# Patient Record
Sex: MALE | Race: BLACK OR AFRICAN AMERICAN | Employment: UNEMPLOYED
[De-identification: names, ages, dates, MRNs, and addresses within clinical notes are randomized per-mention and may not be internally consistent; named-entity substitution may affect disease eponyms.]

---

## 2017-12-03 ENCOUNTER — HEALTH MAINTENANCE LETTER (OUTPATIENT)
Age: 22
End: 2017-12-03

## 2025-04-10 ENCOUNTER — TELEPHONE (OUTPATIENT)
Dept: CARDIOLOGY | Facility: CLINIC | Age: 30
End: 2025-04-10

## 2025-04-10 NOTE — TELEPHONE ENCOUNTER
Attempted to reach pt via [facility] . DARON asking pt to call and schedule with Dr Malave

## 2025-04-14 ENCOUNTER — OFFICE VISIT (OUTPATIENT)
Dept: CARDIOLOGY | Facility: CLINIC | Age: 30
End: 2025-04-14
Attending: SURGERY
Payer: COMMERCIAL

## 2025-04-14 ENCOUNTER — VIRTUAL VISIT (OUTPATIENT)
Dept: INTERPRETER SERVICES | Facility: CLINIC | Age: 30
End: 2025-04-14
Payer: COMMERCIAL

## 2025-04-14 VITALS
DIASTOLIC BLOOD PRESSURE: 84 MMHG | SYSTOLIC BLOOD PRESSURE: 119 MMHG | OXYGEN SATURATION: 99 % | HEART RATE: 53 BPM | WEIGHT: 149.2 LBS

## 2025-04-14 DIAGNOSIS — I35.0 AORTIC VALVE STENOSIS, ETIOLOGY OF CARDIAC VALVE DISEASE UNSPECIFIED: Primary | ICD-10-CM

## 2025-04-14 DIAGNOSIS — Z98.890 HX OF HEART SURGERY: ICD-10-CM

## 2025-04-14 PROCEDURE — G0463 HOSPITAL OUTPT CLINIC VISIT: HCPCS | Performed by: SURGERY

## 2025-04-14 PROCEDURE — 99204 OFFICE O/P NEW MOD 45 MIN: CPT | Performed by: SURGERY

## 2025-04-14 PROCEDURE — T1013 SIGN LANG/ORAL INTERPRETER: HCPCS | Mod: U4,TEL,95

## 2025-04-14 ASSESSMENT — PAIN SCALES - GENERAL: PAINLEVEL_OUTOF10: NO PAIN (0)

## 2025-04-14 NOTE — Clinical Note
4/14/2025      RE: Leonel Mack  [address]  [address]  [facility] [address]       Dear Colleague,    Thank you for the opportunity to participate in the care of your patient, Leonel Mack, at the [facility]. Please see a copy of my visit note below.    No notes on file    Please do not hesitate to contact me if you have any questions/concerns.     Sincerely,     Francisco Javier Malave MD

## 2025-04-14 NOTE — CONFIDENTIAL NOTE
[facility]  [address] [place] [id number]  Telephone: [phone]  Fax: [phone]     Dental clearance sent via fax

## 2025-04-14 NOTE — NURSING NOTE
Chief Complaint   Patient presents with    New Patient     NEW CV SURGERY     Vitals were taken and medications reconciled.    MARC Stinson  3:06 PM

## 2025-04-14 NOTE — PATIENT INSTRUCTIONS
You were seen today in the [facility]                     Cardiothoracic Surgery Clinic    Your surgeon was Dr Francisco Javier Malave recommends:    Aortic valve repair   Pre operative testing including; history and physical, labs, EKG, CT chest, carotid ultrasound, v - we will get these scheduled for you!  Dental clearance (please reach out to your dentist to get cleared for surgery).      *Pre operative testing needed within 30 days of surgery. History and physical with the anesthesia team (PAC), labs, EKG, and chest xray.     *You will be contacted by our surgery scheduler Uma to set up your surgery date. Once we have your surgery date you will hear from Marie, our clinic scheduler, to set up your pre op appointments. Once these are both scheduled you will get a letter with pre operative instructions including possible medication holds, where and when to be at the hospital, and what to bring with you.    Please feel free to call me with any questions or concerns.    Guera Nava RN   Cardiothoracic Surgery  [phone]

## 2025-04-14 NOTE — LETTER
"clubbing or cyanosis.  There is no lower extremity edema.  SKIN: Warm, dry, without jaundice. No rashes or any obvious lesions.  NEUROLOGIC: Grossly nonfocal, CN II-XII intact bilaterally, moving all 4 extremities.      PERTINENT LAB AND TEST RESULTS            Aspartate Aminotransferase (AST)   Date Value Ref Range Status   03/31/2022 17 0 - 40 U/L U/L Final               Glucose   Date Value Ref Range Status   03/31/2022 97 70 - 100 mg/dL Final            Blood Urea Nitrogen   Date Value Ref Range Status   03/31/2022 12.2 6.0 - 20.0 mg/dL Final            Creatinine   Date Value Ref Range Status   03/31/2022 1.01 0.67 - 1.17 mg/dL Final            Chloride   Date Value Ref Range Status   03/31/2022 104 98 - 107 mmol/L Final            Sodium   Date Value Ref Range Status   03/31/2022 137 136 - 145 mmol/L Final            Potassium   Date Value Ref Range Status   03/31/2022 4.2 3.5 - 5.1 mmol/L Final            Calcium, Total   Date Value Ref Range Status   03/31/2022 9.5 8.6 - 10.5 mg/dL Final            CO2   Date Value Ref Range Status   03/31/2022 25 22 - 29 mmol/L Final               No results found for: \"MAG\"           No results found for: \"BNP\"     No results found for: \"CHOL\", \"TRIG\", \"HDL\", \"VLDL\", \"LDL\"               Echocardiogram done today demonstrates normal LV function with a mean gradient of 50 mmHg across his aortic valve      ASSESSMENT/PLAN      Failing bioprosthetic aortic valve replacement: Prior 19 mm epic valve with gradients in the high 20s low 30s chronically though now significantly elevated with a mean gradient today of 50.  Some shortness of breath on exertion.  Given failing bioprosthetic aortic valve in a young patient I think the best option would be redo surgical aortic valve replacement with a mechanical valve.    I discussed the risks and benefits of surgical aortic valve replacement with a mechanical valve. I discussed the risks and benefits of surgery including the risks of " death, bleeding, stroke, infection, renal failure and arrhythmias. He understands and is willing to proceed. He and his family understands the need for lifelong anticoagulation.. He will need a CT chest as part of his evaluation.       Please do not hesitate to contact me if you have any questions/concerns.     Sincerely,     Francisco Javier Malave MD

## 2025-04-16 ENCOUNTER — TELEPHONE (OUTPATIENT)
Dept: CARDIOLOGY | Facility: CLINIC | Age: 30
End: 2025-04-16
Payer: COMMERCIAL

## 2025-04-16 NOTE — TELEPHONE ENCOUNTER
Per task, pt needs to schedule surgery with . tried calling pt with  services. No answer. Will try again later

## 2025-04-21 ENCOUNTER — PREP FOR PROCEDURE (OUTPATIENT)
Dept: CARDIOLOGY | Facility: CLINIC | Age: 30
End: 2025-04-21
Payer: COMMERCIAL

## 2025-04-21 ENCOUNTER — APPOINTMENT (OUTPATIENT)
Dept: INTERPRETER SERVICES | Facility: CLINIC | Age: 30
End: 2025-04-21
Payer: COMMERCIAL

## 2025-04-21 DIAGNOSIS — I35.0 AORTIC STENOSIS: Primary | ICD-10-CM

## 2025-04-22 ENCOUNTER — TELEPHONE (OUTPATIENT)
Dept: CARDIOLOGY | Facility: CLINIC | Age: 30
End: 2025-04-22
Payer: COMMERCIAL

## 2025-04-28 NOTE — TELEPHONE ENCOUNTER
FUTURE VISIT INFORMATION      SURGERY INFORMATION:  Date: 5/29/2025   Location: [facility] OR   Surgeon:  Francisco Javier Malave MD   Anesthesia Type:  General   Procedure: AORTIC VALVE REPLACEMENT (mechanical) AND ANY ASSOCIATED PROCEDURES   Consult: 4/14/25    RECORDS REQUESTED FROM:       Primary Care Provider: Aravind Begum MD    Pertinent Medical History: Aortic stenosis; Acute respiratory failure (H); Aortic valve disorder;     Most recent EKG+ Tracing: 3/24/19    Most recent ECHO: 4/10/25

## 2025-04-29 NOTE — PROGRESS NOTES
"HISTORY     30-year-old male history of bioprosthetic aortic valve replacement 2013 at the [facility] presents today for evaluation with recent gradients noted to be elevated.  He presents today for evaluation.  He states he is generally doing okay.  He does note that when exercising he gets short of breath earlier and fatigue earlier than he used to.  He will occasionally have some strange sensations in his chest though this is relatively rare.  He denies syncope or presyncope though he did get lightheaded today in clinic after talking about potential surgical aortic valve replacement after becoming a bit emotional.  He denies orthopnea or PND.  Denies lower extremity edema.  Denies dwight syncope.      REVIEW OF SYSTEMS      Review of systems complete and other than above, is negative.         PROBLEM LIST           Patient Active Problem List     Diagnosis      Perforation of tympanic membrane      GI bleed      Left ventricular hypertrophy      Severe aortic stenosis      S/P aortic valve replacement with bioprosthetic valve      Aortic valve insufficiency      Mitral insufficiency and aortic stenosis                  ALLERGIES   No Known Allergies     MEDICATIONS      No current outpatient medications on file.         PHYSICAL EXAM      BP: 102/66  Pulse: 63     Resp: 14  SpO2: 100 %  Height: 175.3 cm (69\")  Weight: 66.2 kg (146 lb)  BMI: 21.56      GENERAL: NAD, A&Ox3.  NECK: Supple,  JVD not elevated , no carotid bruit.  LUNGS: clear to auscultation.  HEART: Regular rate and rhythm.2/6 jacob  ABD: NT/ND, no organomegaly noted   EXTREMITIES: No clubbing or cyanosis.  There is no lower extremity edema.  SKIN: Warm, dry, without jaundice. No rashes or any obvious lesions.  NEUROLOGIC: Grossly nonfocal, CN II-XII intact bilaterally, moving all 4 extremities.      PERTINENT LAB AND TEST RESULTS            Aspartate Aminotransferase (AST)   Date Value Ref Range Status   03/31/2022 17 0 - 40 U/L U/L " "Final               Glucose   Date Value Ref Range Status   03/31/2022 97 70 - 100 mg/dL Final            Blood Urea Nitrogen   Date Value Ref Range Status   03/31/2022 12.2 6.0 - 20.0 mg/dL Final            Creatinine   Date Value Ref Range Status   03/31/2022 1.01 0.67 - 1.17 mg/dL Final            Chloride   Date Value Ref Range Status   03/31/2022 104 98 - 107 mmol/L Final            Sodium   Date Value Ref Range Status   03/31/2022 137 136 - 145 mmol/L Final            Potassium   Date Value Ref Range Status   03/31/2022 4.2 3.5 - 5.1 mmol/L Final            Calcium, Total   Date Value Ref Range Status   03/31/2022 9.5 8.6 - 10.5 mg/dL Final            CO2   Date Value Ref Range Status   03/31/2022 25 22 - 29 mmol/L Final               No results found for: \"MAG\"           No results found for: \"BNP\"     No results found for: \"CHOL\", \"TRIG\", \"HDL\", \"VLDL\", \"LDL\"               Echocardiogram done today demonstrates normal LV function with a mean gradient of 50 mmHg across his aortic valve      ASSESSMENT/PLAN      Failing bioprosthetic aortic valve replacement: Prior 19 mm epic valve with gradients in the high 20s low 30s chronically though now significantly elevated with a mean gradient today of 50.  Some shortness of breath on exertion.  Given failing bioprosthetic aortic valve in a young patient I think the best option would be redo surgical aortic valve replacement with a mechanical valve.    I discussed the risks and benefits of surgical aortic valve replacement with a mechanical valve. I discussed the risks and benefits of surgery including the risks of death, bleeding, stroke, infection, renal failure and arrhythmias. He understands and is willing to proceed. He and his family understands the need for lifelong anticoagulation.. He will need a CT chest as part of his evaluation.     "

## 2025-04-30 ENCOUNTER — TELEPHONE (OUTPATIENT)
Dept: CARDIOLOGY | Facility: CLINIC | Age: 30
End: 2025-04-30
Payer: COMMERCIAL

## 2025-04-30 NOTE — LETTER
To Whom it may concern,    This letter is for Leonel Frederick (1995). He was seen at the [facility] for an evaluation of his heart valve with Dr. Francisco Javier Malave on  4/14/2025.     Due to the nature of the condition patient will need to receive open heart surgery to fix his heart valve on May 29th, 2025 and will need to attend multiple appointments to complete his pre operative testing.      Please let us know if you have any questions or concerns  Cardiothoracic Surgery  [phone]

## 2025-04-30 NOTE — TELEPHONE ENCOUNTER
Patient called in regards dental treatment. Patient will see dentist at [facility] on 5/23 for teeth extraction     [facility] dental [phone]

## 2025-05-12 ENCOUNTER — DOCUMENTATION ONLY (OUTPATIENT)
Dept: CARDIOLOGY | Facility: CLINIC | Age: 30
End: 2025-05-12
Payer: COMMERCIAL

## 2025-05-13 ENCOUNTER — TELEPHONE (OUTPATIENT)
Dept: CARDIOLOGY | Facility: CLINIC | Age: 30
End: 2025-05-13
Payer: COMMERCIAL

## 2025-05-21 ENCOUNTER — PREP FOR PROCEDURE (OUTPATIENT)
Dept: CARDIOLOGY | Facility: CLINIC | Age: 30
End: 2025-05-21
Payer: COMMERCIAL

## 2025-05-21 LAB
ABO + RH BLD: NORMAL
BLD GP AB SCN SERPL QL: NEGATIVE
SPECIMEN EXP DATE BLD: NORMAL

## 2025-05-22 ENCOUNTER — ANCILLARY PROCEDURE (OUTPATIENT)
Dept: ULTRASOUND IMAGING | Facility: CLINIC | Age: 30
End: 2025-05-22
Attending: SURGERY
Payer: COMMERCIAL

## 2025-05-22 ENCOUNTER — ANESTHESIA EVENT (OUTPATIENT)
Dept: SURGERY | Facility: CLINIC | Age: 30
End: 2025-05-22
Payer: COMMERCIAL

## 2025-05-22 ENCOUNTER — LAB (OUTPATIENT)
Dept: LAB | Facility: CLINIC | Age: 30
End: 2025-05-22
Payer: COMMERCIAL

## 2025-05-22 ENCOUNTER — OFFICE VISIT (OUTPATIENT)
Dept: SURGERY | Facility: CLINIC | Age: 30
End: 2025-05-22
Payer: COMMERCIAL

## 2025-05-22 ENCOUNTER — PRE VISIT (OUTPATIENT)
Dept: SURGERY | Facility: CLINIC | Age: 30
End: 2025-05-22
Payer: COMMERCIAL

## 2025-05-22 VITALS
HEART RATE: 54 BPM | DIASTOLIC BLOOD PRESSURE: 78 MMHG | SYSTOLIC BLOOD PRESSURE: 112 MMHG | BODY MASS INDEX: 22.73 KG/M2 | WEIGHT: 150 LBS | OXYGEN SATURATION: 98 % | HEIGHT: 68 IN | TEMPERATURE: 97.8 F

## 2025-05-22 DIAGNOSIS — Z01.818 PREOP EXAMINATION: Primary | ICD-10-CM

## 2025-05-22 DIAGNOSIS — I35.0 AORTIC VALVE STENOSIS, ETIOLOGY OF CARDIAC VALVE DISEASE UNSPECIFIED: ICD-10-CM

## 2025-05-22 DIAGNOSIS — Z98.890 HX OF HEART SURGERY: ICD-10-CM

## 2025-05-22 DIAGNOSIS — Z95.2 HISTORY OF AORTIC VALVE REPLACEMENT: ICD-10-CM

## 2025-05-22 LAB
ALBUMIN SERPL BCG-MCNC: 4.4 G/DL (ref 3.5–5.2)
ALBUMIN UR-MCNC: NEGATIVE MG/DL
ALP SERPL-CCNC: 84 U/L (ref 40–150)
ALT SERPL W P-5'-P-CCNC: 14 U/L (ref 0–70)
ANION GAP SERPL CALCULATED.3IONS-SCNC: 10 MMOL/L (ref 7–15)
APPEARANCE UR: CLEAR
APTT PPP: 27 SECONDS (ref 22–38)
AST SERPL W P-5'-P-CCNC: 24 U/L (ref 0–45)
ATRIAL RATE - MUSE: 47 BPM
BILIRUB SERPL-MCNC: 0.6 MG/DL
BILIRUB UR QL STRIP: NEGATIVE
BUN SERPL-MCNC: 9.6 MG/DL (ref 6–20)
CALCIUM SERPL-MCNC: 9.2 MG/DL (ref 8.8–10.4)
CHLORIDE SERPL-SCNC: 104 MMOL/L (ref 98–107)
COLOR UR AUTO: ABNORMAL
CREAT SERPL-MCNC: 0.77 MG/DL (ref 0.67–1.17)
DIASTOLIC BLOOD PRESSURE - MUSE: NORMAL MMHG
EGFRCR SERPLBLD CKD-EPI 2021: >90 ML/MIN/1.73M2
ERYTHROCYTE [DISTWIDTH] IN BLOOD BY AUTOMATED COUNT: 13.4 % (ref 10–15)
EST. AVERAGE GLUCOSE BLD GHB EST-MCNC: 117 MG/DL
GLUCOSE SERPL-MCNC: 90 MG/DL (ref 70–99)
GLUCOSE UR STRIP-MCNC: NEGATIVE MG/DL
HBA1C MFR BLD: 5.7 %
HCO3 SERPL-SCNC: 23 MMOL/L (ref 22–29)
HCT VFR BLD AUTO: 44.5 % (ref 40–53)
HGB BLD-MCNC: 14.7 G/DL (ref 13.3–17.7)
HGB UR QL STRIP: NEGATIVE
INR PPP: 1 (ref 0.85–1.15)
INTERPRETATION ECG - MUSE: NORMAL
KETONES UR STRIP-MCNC: NEGATIVE MG/DL
LEUKOCYTE ESTERASE UR QL STRIP: NEGATIVE
MAGNESIUM SERPL-MCNC: 2.3 MG/DL (ref 1.7–2.3)
MCH RBC QN AUTO: 29.1 PG (ref 26.5–33)
MCHC RBC AUTO-ENTMCNC: 33 G/DL (ref 31.5–36.5)
MCV RBC AUTO: 88 FL (ref 78–100)
NITRATE UR QL: NEGATIVE
P AXIS - MUSE: 63 DEGREES
PH UR STRIP: 7.5 [PH] (ref 5–7)
PLATELET # BLD AUTO: 127 10E3/UL (ref 150–450)
POTASSIUM SERPL-SCNC: 3.9 MMOL/L (ref 3.4–5.3)
PR INTERVAL - MUSE: 158 MS
PREALB SERPL-MCNC: 17.7 MG/DL (ref 20–40)
PROT SERPL-MCNC: 8.3 G/DL (ref 6.4–8.3)
PROTHROMBIN TIME: 13.4 SECONDS (ref 11.8–14.8)
QRS DURATION - MUSE: 114 MS
QT - MUSE: 462 MS
QTC - MUSE: 408 MS
R AXIS - MUSE: 8 DEGREES
RBC # BLD AUTO: 5.06 10E6/UL (ref 4.4–5.9)
RBC URINE: 0 /HPF
SODIUM SERPL-SCNC: 137 MMOL/L (ref 135–145)
SP GR UR STRIP: >1.005 (ref 1–1.03)
SYSTOLIC BLOOD PRESSURE - MUSE: NORMAL MMHG
T AXIS - MUSE: -18 DEGREES
UROBILINOGEN UR STRIP-MCNC: NORMAL MG/DL
VENTRICULAR RATE- MUSE: 47 BPM
WBC # BLD AUTO: 5.7 10E3/UL (ref 4–11)
WBC URINE: <1 /HPF

## 2025-05-22 PROCEDURE — 99000 SPECIMEN HANDLING OFFICE-LAB: CPT | Performed by: PATHOLOGY

## 2025-05-22 PROCEDURE — 83036 HEMOGLOBIN GLYCOSYLATED A1C: CPT | Performed by: SURGERY

## 2025-05-22 PROCEDURE — 93880 EXTRACRANIAL BILAT STUDY: CPT | Performed by: RADIOLOGY

## 2025-05-22 PROCEDURE — 84134 ASSAY OF PREALBUMIN: CPT | Performed by: SURGERY

## 2025-05-22 PROCEDURE — 86900 BLOOD TYPING SEROLOGIC ABO: CPT

## 2025-05-22 RX ORDER — VITAMIN A 3000 MCG
10000 CAPSULE ORAL PRN
COMMUNITY

## 2025-05-22 RX ORDER — ERGOCALCIFEROL (VITAMIN D2) 10 MCG
TABLET ORAL PRN
COMMUNITY

## 2025-05-22 ASSESSMENT — PATIENT HEALTH QUESTIONNAIRE - PHQ9
SUM OF ALL RESPONSES TO PHQ QUESTIONS 1-9: 4
SUM OF ALL RESPONSES TO PHQ QUESTIONS 1-9: 4
10. IF YOU CHECKED OFF ANY PROBLEMS, HOW DIFFICULT HAVE THESE PROBLEMS MADE IT FOR YOU TO DO YOUR WORK, TAKE CARE OF THINGS AT HOME, OR GET ALONG WITH OTHER PEOPLE: NOT DIFFICULT AT ALL

## 2025-05-22 ASSESSMENT — PAIN SCALES - GENERAL: PAINLEVEL_OUTOF10: NO PAIN (0)

## 2025-05-22 ASSESSMENT — ENCOUNTER SYMPTOMS: ORTHOPNEA: 0

## 2025-05-22 NOTE — H&P
Pre-Operative H & P     CC:  Preoperative exam to assess for increased cardiopulmonary risk while undergoing surgery and anesthesia.    Date of Encounter: 5/22/2025  Primary Care Physician:  Aravind Begum     Reason for visit:   Encounter Diagnoses   Name Primary?    Preop examination Yes    Aortic valve stenosis, etiology of cardiac valve disease unspecified     History of aortic valve replacement        HPI  Leonel Mack is a 30 year old male who presents for pre-operative H & P in preparation for  Procedure Information       Case: [id number] Date/Time: 05/29/25 0730    Procedure: AORTIC VALVE REPLACEMENT (mechanical) AND ANY ASSOCIATED PROCEDURES (Chest)    Anesthesia type: General    Diagnosis: Aortic stenosis [I35.0]    Pre-op diagnosis: Aortic stenosis [I35.0]    Location:  OR [address] [place] [address] OR    Providers: Francisco Javier Malave MD            Leonel Mack is a 30 year old male with no chronic conditions other than aortic valve disease.  He is s/p bioprosthetic aortic valve replacement in 2013 for congenital subaortic stenosis of membranous type.  In the past year his mean gradient has increased from 24 to 41.47 showing severe aortic stenosis.  He is asymptomatic other than fatigue.  He was referred back here to Dr. Malave and the above listed procedure has been recommended for treatment.     History is obtained from the patient, his mother, and chart review  -[employer]  utilized via virtual device    Hx of abnormal bleeding or anti-platelet use: none      Past Medical History  Past Medical History:   Diagnosis Date    Aortic valve stenosis, congenital     s/p aortic valve replacement in 2013    Congenital subaortic stenosis of membranous type     History of GI bleed 2019    Status post aortic valve replacement 2013       Past Surgical History  Past Surgical History:   Procedure Laterality Date    NO HISTORY OF SURGERY      REPLACE VALVE AORTIC  12/17/2013    Procedure: REPLACE VALVE AORTIC;   Median Sternotomy on pump oxygenator, Aortic Valve Replacement, Transesophegeal Echo Cardiogram by Dr. Bojorquez. ;  Surgeon: Jerome Patel MD;  Location:  OR       Prior to Admission Medications  Current Outpatient Medications   Medication Sig Dispense Refill    vitamin A 3 MG (55165 UNITS) capsule Take 10,000 Units by mouth as needed.      Vitamin D, Cholecalciferol, 10 MCG (400 UNIT) TABS Take by mouth as needed.      acetaminophen (TYLENOL) 325 MG tablet Take 2 tablets (650 mg) by mouth every 6 hours 100 tablet 1    Furosemide (LASIX) 20 MG tablet Take 0.5 tablets (10 mg) by mouth daily 30 tablet 0    polyethylene glycol (MIRALAX/GLYCOLAX) packet Take 17 g by mouth 2 times daily 20 packet 1    senna-docusate (SENOKOT-S;PERICOLACE) 8.6-50 MG per tablet Take 1 tablet by mouth 2 times daily 20 tablet 1       Allergies  No Known Allergies    Social History  Social History     Socioeconomic History    Marital status: Single     Spouse name: Not on file    Number of children: Not on file    Years of education: Not on file    Highest education level: Not on file   Occupational History    Not on file   Tobacco Use    Smoking status: Never     Passive exposure: Never    Smokeless tobacco: Never   Substance and Sexual Activity    Alcohol use: Never    Drug use: Never    Sexual activity: Not on file   Other Topics Concern    Not on file   Social History Narrative    Not on file     Social Drivers of Health     Financial Resource Strain: Low Risk  (4/24/2024)    Received from [facility]    Overall Financial Resource Strain (CARDIA)     Difficulty of Paying Living Expenses: Not very hard   Food Insecurity: Not on file   Transportation Needs: Not on file   Physical Activity: Unknown (4/24/2024)    Received from [facility] [employer] [facility]    Exercise Vital Sign     Days of Exercise per Week: 3 days     Minutes of Exercise per Session: Not on file   Stress: No Stress Concern Present  (4/24/2024)    Received from [employer] [facility]    [employer] - Occupational Stress Questionnaire     Feeling of Stress : Not at all   Social Connections: Moderately Integrated (4/24/2024)    Received from [facility]    Social Connection and Isolation Panel [NHANES]     Frequency of Communication with Friends and Family: More than three times a week     Frequency of Social Gatherings with Friends and Family: Three times a week     Attends Jew Services: 1 to 4 times per year     Active Member of Clubs or Organizations: No     Attends Club or Organization Meetings: Never     Marital Status:    Interpersonal Safety: Not At Risk (4/24/2024)    Received from [facility]    Humiliation, Afraid, Rape, and Kick questionnaire     Fear of Current or Ex-Partner: No     Emotionally Abused: No     Physically Abused: No     Sexually Abused: No   Housing Stability: Unknown (4/24/2024)    Received from [facility]    Housing Stability Vital Sign     Unable to Pay for Housing in the Last Year: No     Number of Times Moved in the Last Year: Not on file     Homeless in the Last Year: Not on file       Family History  No family history on file.    Review of Systems  The complete review of systems is negative other than noted in the HPI or here.   Anesthesia Evaluation   Pt has had prior anesthetic.     No history of anesthetic complications       ROS/MED HX  ENT/Pulmonary:  - neg pulmonary ROS  (-) recent URI   Neurologic:  - neg neurologic ROS     Cardiovascular:     (+)  - -   -  - -                           valvular problems/murmurs type: AS severe aortic valve stenosis.  s/p bioprosthetic  valve replacement in 2013..    Previous cardiac testing   Echo: Date: 4/2025 Results:  Summary:    * Prior study from 04/02/25.     * Limited echocardiogram to evaluate LV function and aortic valve   function.    * Normal left  "nuclear systolic function with ejection fraction of 55 to   60%.    * 19 mm epic bioprosthetic valve with a peak velocity of 4.5 m/s and mean   gradient 51 mmHg across the valve without significant aortic insufficiency.   Dimensionless index of 0.22.  This suggests severe bioprosthetic aortic   valve   stenosis. This is progressed since prior echo.     Stress Test:  Date: Results:    ECG Reviewed:  Date: 5/22/25 Results:    Sinus bradycardia  T wave abnormality, consider lateral ischemia  Abnormal ECG      Cath:  Date: Results:   (-) CARPENTER and orthopnea/PND   METS/Exercise Tolerance: >4 METS Comment: Works out at the gym occasionally doing mostly cardio, but sometimes does weight training also.      Denies any exertional dyspnea or angina.   But does fatigue easily   Hematologic: Comments: thrombocytopenia      Musculoskeletal:  - neg musculoskeletal ROS     GI/Hepatic: Comment: History of GI bleed 2019      Renal/Genitourinary:  - neg Renal ROS     Endo:  - neg endo ROS     Psychiatric/Substance Use:  - neg psychiatric ROS     Infectious Disease:  - neg infectious disease ROS     Malignancy:  - neg malignancy ROS     Other:  - neg other ROS          /78 (BP Location: Right arm, Patient Position: Sitting, Cuff Size: Adult Regular)   Pulse 54   Temp 97.8  F (36.6  C) (Oral)   Ht 1.727 m (5' 8\")   Wt 68 kg (150 lb)   SpO2 98%   BMI 22.81 kg/m      Physical Exam   Constitutional: Awake, alert, cooperative, no apparent distress, and appears stated age.  Eyes: Pupils equal, round and reactive to light, extra ocular muscles intact, sclera clear, conjunctiva normal.  HENT: Normocephalic, oral pharynx with moist mucus membranes, good dentition. No goiter appreciated.   Respiratory: Clear to auscultation bilaterally, no crackles or wheezing.  Cardiovascular: Regular rate and rhythm, normal S1 and S2, and 4/6 murmur noted.  Carotids +2, no bruits. No edema. Palpable pulses to radial  DP and PT arteries.   GI: Normal " bowel sounds, soft, non-distended, non-tender, no masses palpated, no hepatosplenomegaly.    Lymph/Hematologic: No cervical lymphadenopathy and no supraclavicular lymphadenopathy.  Genitourinary:  deferred  Skin: Warm and dry.  No rashes at anticipated surgical site.   Musculoskeletal: Full ROM of neck. There is no redness, warmth, or swelling of the joints. Gross motor strength is normal.    Neurologic: Awake, alert, oriented to name, place and time. Cranial nerves II-XII are grossly intact. Gait is normal.   Neuropsychiatric: Calm, cooperative. Normal affect.     Prior Labs/Diagnostic Studies   All labs and imaging pertinent to the visit personally reviewed     EKG/ stress test - if available please see in ROS above       The patient's records and results pertinent to the visit personally reviewed by this provider.     Outside records reviewed from: Care Everywhere    LAB/DIAGNOSTIC STUDIES TODAY:    Component      Latest Ref Rng 5/22/2025  1:22 PM   Sodium      135 - 145 mmol/L 137    Potassium      3.4 - 5.3 mmol/L 3.9    Carbon Dioxide (CO2)      22 - 29 mmol/L 23    Anion Gap      7 - 15 mmol/L 10    Urea Nitrogen      6.0 - 20.0 mg/dL 9.6    Creatinine      0.67 - 1.17 mg/dL 0.77    GFR Estimate      >60 mL/min/1.73m2 >90    Calcium      8.8 - 10.4 mg/dL 9.2    Chloride      98 - 107 mmol/L 104    Glucose      70 - 99 mg/dL 90    Alkaline Phosphatase      40 - 150 U/L 84    AST      0 - 45 U/L 24    ALT      0 - 70 U/L 14    Protein Total      6.4 - 8.3 g/dL 8.3    Albumin      3.5 - 5.2 g/dL 4.4    Bilirubin Total      <=1.2 mg/dL 0.6    WBC      4.0 - 11.0 10e3/uL 5.7    RBC Count      4.40 - 5.90 10e6/uL 5.06    Hemoglobin      13.3 - 17.7 g/dL 14.7    Hematocrit      40.0 - 53.0 % 44.5    MCV      78 - 100 fL 88    MCH      26.5 - 33.0 pg 29.1    MCHC      31.5 - 36.5 g/dL 33.0    RDW      10.0 - 15.0 % 13.4    Platelet Count      150 - 450 10e3/uL 127 (L)    INR      0.85 - 1.15  1.00    PT      11.8 -  "14.8 Seconds 13.4    PTT      22 - 38 Seconds 27    Magnesium      1.7 - 2.3 mg/dL 2.3       Legend:  (L) Low    Assessment    Leonel Mack is a 30 year old male seen as a PAC referral for risk assessment and optimization for anesthesia.    Plan/Recommendations  Pt will be optimized for the proposed procedure.  See below for details on the assessment, risk, and preoperative recommendations    NEUROLOGY  - No history of TIA, CVA or seizure    -Post Op delirium risk factors:  No risk identified    ENT  - No current airway concerns.  Will need to be reassessed day of surgery.  Mallampati: I  TM: > 3    CARDIAC  - No history of CAD, Hypertension, and Afib  -s/p aortic valve replacement in 2013, now with severe aortic stenosis.  Surgery planned as above.    - METS (Metabolic Equivalents)  Patient performs 4 or more METS exercise without symptoms                  PULMONARY    MYRNA Low Risk             Total Score: 1    MYRNA: Male      - Denies asthma or inhaler use  - Tobacco History    History   Smoking Status    Never   Smokeless Tobacco    Never       GI  - denies GERD  PONV Medium Risk  Total Score: 2           1 AN PONV: Patient is not a current smoker    1 AN PONV: Intended Post Op Opioids            ENDOCRINE    - BMI: Estimated body mass index is 22.81 kg/m  as calculated from the following:    Height as of this encounter: 1.727 m (5' 8\").    Weight as of this encounter: 68 kg (150 lb).  Healthy Weight (BMI 18.5-24.9)  - No history of Diabetes Mellitus    HEME  VTE Low Risk 0.5%             Total Score: 2    VTE: Male      - No history of abnormal bleeding or antiplatelet use.    A type and screen has been ordered for this patient    - thrombocytopenia.  See labs above.     MSK  Patient is NOT Frail             Total Score: 0                Different anesthesia methods/types have been discussed with the patient, but they are aware that the final plan will be decided by the assigned anesthesia provider on the " date of service.      The patient is optimized for their procedure. AVS with information on surgery time/arrival time, meds and NPO status given by nursing staff. No further diagnostic testing indicated.        48 minutes were spent on the date of the encounter performing chart review, history and exam, documentation and/or discussion with other providers about the issues documented above.    SANDRA Gandhi CNP  Preoperative Assessment Center  [facility]  Clinic and Surgery Center  Phone: [phone]  Fax: [phone]

## 2025-05-22 NOTE — PATIENT INSTRUCTIONS
Preparing for Your Surgery      Name:  Leonel Mack   MRN:  [id number]   :  1995   Today's Date:  2025     The [place] [employer] I-[address] Construction Project                                Timeline 2025 -2025    This project will affect travel to the [facility] and [facility], as well as the [facility].    Please check the [facility] [address] project website for the most up to date information and give yourself additional time to reach your destination.    Arriving for surgery:  Surgery date:  25  Arrival time:  5.00AM    Please come to:     Please come to:       [facility]    [address] [place]  [address]     The [facility] ([facility] [place] [facility]) [place] Patient/Visitor Ramp is at [address]. Patients and visitors who self-park will receive the reduced hospital parking rate. If the Patient /Visitor Ramp is full, please follow the signs to the [employer] car park located at the [facility] entrance.      [employer] parking is available (24 hours/ 7 days a week)      Discounted parking pass options are available for patients and visitors. They can be purchased at the [employer] desk at the [facility] entrance.     -    Stop at the security desk and they will direct surgery patients to the Surgery Check in and Family Lounge. [phone]        - If you need directions, a wheelchair or an escort please stop at the Information/security desk in the lobby.     What can I eat or drink?  -  You may eat and drink normally up to 8 hours prior to arrival time. (Until 9.00PM)  -  You may have clear liquids until 2 hours prior to arrival time. (Until 3.00AM)    Examples of clear liquids:  Water  Clear broth  Juices (apple, white grape, white cranberry  and cider) without pulp  Noncarbonated, powder based beverages  (lemonade and Carlos-Aid)  Sodas  (Bernna, 7-Up, ginger ale and seltzer)  Coffee or tea (without milk or cream)  Gatorade    -  No Alcohol or cannabis products for at least 24 hours before surgery.     Which medicines can I take?    Hold Multivitamins for 7 days before surgery.  Hold Supplements for 7 days before surgery.  Hold Ibuprofen (Advil, Motrin) for 7 day(s) before surgery--unless otherwise directed by surgeon.  Hold Naproxen (Aleve) for 7 days before surgery.    -  DO NOT take these medications the day of surgery:  Vitamin A, D    -  PLEASE TAKE these medications the day of surgery:  None     How do I prepare myself?  - Please take 2 showers (one the night prior to surgery and one the morning of surgery) using Scrubcare or Hibiclens soap.    Use this soap only from the neck to your toes. Avoid genital area      Leave the soap on your skin for one minute--then rinse thoroughly.      You may use your own shampoo and conditioner. No other hair products.   - Please remove all jewelry and body piercings.  - No lotions, deodorants or fragrance.  - No makeup or fingernail polish.   - Bring your ID and insurance card.    -For patients being admitted to the [facility]  Family members are to take the patient belongings with them and place them in the lockers provided in the Family Lounge.  Please limit the items you bring to 1 bag as the lockers are small.      -If you use a CPAP machine, please bring the CPAP machine, tubing, and mask to hospital.    -If you have a Deep Brain Stimulator, Spinal Cord Stimulator, or any Neuro Stimulator device---you must bring the remote control to the hospital.      ALL PATIENTS GOING HOME THE SAME DAY OF SURGERY ARE REQUIRED TO HAVE A RESPONSIBLE ADULT TO DRIVE AND BE IN ATTENDANCE WITH THEM FOR 24 HOURS FOLLOWING SURGERY.    Covid testing policy as of 12/06/2022  Your surgeon will notify and schedule you for a COVID test if one is needed before surgery--please direct any questions or COVID symptoms to your  surgeon      Questions or Concerns:    - For any questions regarding the day of surgery or your hospital stay, please contact the Pre Admission Nursing Office at [phone].       - If you have health changes between today and your surgery, please call your surgeon.       - For questions after surgery, please call your surgeons office.           Current Visitor Guidelines    2 adult visitors for adult patients in the pre op area    If additional visitors come (beyond a patient care attendant or a group home caregiver), the additional visitors will be asked to wait in the main lobby of the hospital    Visiting hours: 8 a.m. to 8:30 p.m.    Patients confirmed or suspected to have symptoms of COVID 19 or flu:     No visitors allowed for adult patients.   Children (under age 18) can have 1 named visitor.     People who are sick or showing symptoms of COVID 19 or flu:    Are not allowed to visit patients--we can only make exceptions in special situations.       Please follow these guidelines for your visit:          Please maintain social distance          Masking is optional--however at times you may be asked to wear a mask for the safety of yourself and others     Clean your hands with alcohol hand . Do this when you arrive at and leave the building and patient room,    And again after you touch your mask or anything in the room.     Go directly to and from the room you are visiting.     Stay in the patient s room during your visit. Limit going to other places in the hospital as much as possible     Leave bags and jackets at home or in the car.     For everyone s health, please don t come and go during your visit. That includes for smoking   during your visit.

## 2025-05-22 NOTE — PROVIDER NOTIFICATION
05/22/25 1130   Discharge Planning   Patient/Family Anticipates Transition to home with family   Concerns to be Addressed all concerns addressed in this encounter   Living Arrangements   People in Home child(lidia), dependent;spouse   Type of Residence Private Residence   Is your private residence a single family home or apartment? Apartment   Number of Stairs, Within Home, Primary none   Stair Railings, Within Home, Primary none   Once home, are you able to live on one level? Yes   Which level? Main Level   Bathroom Shower/Tub Tub/Shower unit   Equipment Currently Used at Home none   Support System   Clinic recommendation is to have someone available to stay with you for two weeks once home to support you, meal preparation, household tasks, etc. Do you have this support?  Yes   Name Of Support Person? Rina or Roberto   Following surgery, you will not be able to drive for one month and will need support bringing you to and from appointments. Who will drive you home after your hospital discharge? Rina or Roberto   Relationship/Environment   Name(s) of People in Home Patient's spouse and young child.

## 2025-05-28 ENCOUNTER — TELEPHONE (OUTPATIENT)
Dept: CARDIOLOGY | Facility: CLINIC | Age: 30
End: 2025-05-28
Payer: COMMERCIAL

## 2025-05-28 DIAGNOSIS — I35.0 AORTIC VALVE STENOSIS, ETIOLOGY OF CARDIAC VALVE DISEASE UNSPECIFIED: Primary | ICD-10-CM

## 2025-06-02 ENCOUNTER — TELEPHONE (OUTPATIENT)
Dept: CARDIOLOGY | Facility: CLINIC | Age: 30
End: 2025-06-02
Payer: COMMERCIAL

## 2025-06-04 NOTE — CONFIDENTIAL NOTE
FUTURE VISIT INFORMATION      SURGERY INFORMATION:  Date: 2025   Location: [facility] OR   Surgeon:  Francisco Javier Malave MD, Clemente Ocampo MD   Anesthesia Type:  General  Procedure: REDO STERNOTOMY REDO AORTIC VALVE REPLACEMENT(mechanical).  POSSIBLE AORTIC ROOT REPLACEMENT AND ANY ASSOCIATED PROCEDURES       RECORDS REQUESTED FROM:       Primary Care Provider: Trisha Pimentel MD  - [facility]    Pertinent Medical History:  Acute respiratory failure    Most recent EKG+ Tracin2025    Most recent ECHO: 07.15.2025

## 2025-07-02 LAB
ABO + RH BLD: NORMAL
BLD GP AB SCN SERPL QL: NEGATIVE
SPECIMEN EXP DATE BLD: NORMAL

## 2025-07-03 ENCOUNTER — LAB (OUTPATIENT)
Dept: LAB | Facility: CLINIC | Age: 30
End: 2025-07-03
Payer: COMMERCIAL

## 2025-07-03 ENCOUNTER — OFFICE VISIT (OUTPATIENT)
Dept: SURGERY | Facility: CLINIC | Age: 30
End: 2025-07-03
Payer: COMMERCIAL

## 2025-07-03 ENCOUNTER — PRE VISIT (OUTPATIENT)
Dept: SURGERY | Facility: CLINIC | Age: 30
End: 2025-07-03

## 2025-07-03 VITALS
BODY MASS INDEX: 22.01 KG/M2 | WEIGHT: 145.2 LBS | OXYGEN SATURATION: 99 % | HEART RATE: 53 BPM | DIASTOLIC BLOOD PRESSURE: 69 MMHG | HEIGHT: 68 IN | SYSTOLIC BLOOD PRESSURE: 99 MMHG

## 2025-07-03 DIAGNOSIS — I35.0 AORTIC VALVE STENOSIS, ETIOLOGY OF CARDIAC VALVE DISEASE UNSPECIFIED: ICD-10-CM

## 2025-07-03 DIAGNOSIS — Z01.818 PREOP EXAMINATION: Primary | ICD-10-CM

## 2025-07-03 LAB
ALBUMIN SERPL BCG-MCNC: 4.4 G/DL (ref 3.5–5.2)
ALBUMIN UR-MCNC: NEGATIVE MG/DL
ALP SERPL-CCNC: 79 U/L (ref 40–150)
ALT SERPL W P-5'-P-CCNC: 8 U/L (ref 0–70)
ANION GAP SERPL CALCULATED.3IONS-SCNC: 9 MMOL/L (ref 7–15)
APPEARANCE UR: CLEAR
APTT PPP: 27 SECONDS (ref 22–38)
AST SERPL W P-5'-P-CCNC: 22 U/L (ref 0–45)
ATRIAL RATE - MUSE: 53 BPM
BILIRUB SERPL-MCNC: 0.7 MG/DL
BILIRUB UR QL STRIP: NEGATIVE
BUN SERPL-MCNC: 10.4 MG/DL (ref 6–20)
CALCIUM SERPL-MCNC: 9.2 MG/DL (ref 8.8–10.4)
CHLORIDE SERPL-SCNC: 106 MMOL/L (ref 98–107)
COLOR UR AUTO: YELLOW
CREAT SERPL-MCNC: 0.85 MG/DL (ref 0.67–1.17)
DIASTOLIC BLOOD PRESSURE - MUSE: NORMAL MMHG
EGFRCR SERPLBLD CKD-EPI 2021: >90 ML/MIN/1.73M2
ERYTHROCYTE [DISTWIDTH] IN BLOOD BY AUTOMATED COUNT: 13 % (ref 10–15)
EST. AVERAGE GLUCOSE BLD GHB EST-MCNC: 120 MG/DL
GLUCOSE SERPL-MCNC: 113 MG/DL (ref 70–99)
GLUCOSE UR STRIP-MCNC: NEGATIVE MG/DL
HBA1C MFR BLD: 5.8 %
HCO3 SERPL-SCNC: 23 MMOL/L (ref 22–29)
HCT VFR BLD AUTO: 41 % (ref 40–53)
HGB BLD-MCNC: 14 G/DL (ref 13.3–17.7)
HGB UR QL STRIP: ABNORMAL
INR PPP: 1.07 (ref 0.85–1.15)
INTERPRETATION ECG - MUSE: NORMAL
KETONES UR STRIP-MCNC: NEGATIVE MG/DL
LEUKOCYTE ESTERASE UR QL STRIP: NEGATIVE
MAGNESIUM SERPL-MCNC: 2 MG/DL (ref 1.7–2.3)
MCH RBC QN AUTO: 29.8 PG (ref 26.5–33)
MCHC RBC AUTO-ENTMCNC: 34.1 G/DL (ref 31.5–36.5)
MCV RBC AUTO: 87 FL (ref 78–100)
MUCOUS THREADS #/AREA URNS LPF: PRESENT /LPF
NITRATE UR QL: NEGATIVE
P AXIS - MUSE: 67 DEGREES
PH UR STRIP: 5.5 [PH] (ref 5–7)
PLATELET # BLD AUTO: 138 10E3/UL (ref 150–450)
POTASSIUM SERPL-SCNC: 3.8 MMOL/L (ref 3.4–5.3)
PR INTERVAL - MUSE: 156 MS
PREALB SERPL-MCNC: 17.7 MG/DL (ref 20–40)
PROT SERPL-MCNC: 8.2 G/DL (ref 6.4–8.3)
PROTHROMBIN TIME: 14.2 SECONDS (ref 11.8–14.8)
QRS DURATION - MUSE: 110 MS
QT - MUSE: 428 MS
QTC - MUSE: 401 MS
R AXIS - MUSE: 10 DEGREES
RBC # BLD AUTO: 4.7 10E6/UL (ref 4.4–5.9)
RBC URINE: 3 /HPF
SODIUM SERPL-SCNC: 138 MMOL/L (ref 135–145)
SP GR UR STRIP: 1.03 (ref 1–1.03)
SYSTOLIC BLOOD PRESSURE - MUSE: NORMAL MMHG
T AXIS - MUSE: 8 DEGREES
UROBILINOGEN UR STRIP-MCNC: NORMAL MG/DL
VENTRICULAR RATE- MUSE: 53 BPM
WBC # BLD AUTO: 4.4 10E3/UL (ref 4–11)
WBC URINE: <1 /HPF

## 2025-07-03 PROCEDURE — 86900 BLOOD TYPING SEROLOGIC ABO: CPT

## 2025-07-03 PROCEDURE — 83036 HEMOGLOBIN GLYCOSYLATED A1C: CPT | Performed by: SURGERY

## 2025-07-03 PROCEDURE — 99000 SPECIMEN HANDLING OFFICE-LAB: CPT | Performed by: PATHOLOGY

## 2025-07-03 PROCEDURE — 84134 ASSAY OF PREALBUMIN: CPT | Performed by: SURGERY

## 2025-07-03 ASSESSMENT — LIFESTYLE VARIABLES: TOBACCO_USE: 0

## 2025-07-03 ASSESSMENT — PAIN SCALES - GENERAL: PAINLEVEL_OUTOF10: NO PAIN (0)

## 2025-07-03 ASSESSMENT — ENCOUNTER SYMPTOMS: ORTHOPNEA: 0

## 2025-07-03 NOTE — PATIENT INSTRUCTIONS
U diyaar garowga Ahsanlliinjasonaga    Name:  Leonel Mack   MRN:  [id number]   :  1995   Today's Date:  7/3/2025     Waaxda Gaadiidka [facility] [address] Mashruuca Dhismaha                                Jadwalka wakhtiga Abriil  - Noofambar     Mashruucani waxa uu saamayn doonaa socdaalka Jaamacadda Bariga iyo Cisbitaalada daanta galbeed, iyo sidoo [facility] i Xarunta Voniinjason.    Fadlan ka hubi websaydka mashruuca [place] si aad u hesho macluumaadka ugu casrisan oo nafta wakhti dheeraad ah sii si aad u gaadho meeshaad u socoto.    Imaatinka qaliinka:  Taariikhda qaliinka: 7/15/25  Waqtiga imaatinka: 5:30 subaxnimo  Waqtiga qaliinka: 7:30 subaxnimo    Fadlan pierre:         [facility]    [address]   [place], [place] [id number] [place] [address] ([facility]) Bukaan-socod/Boqde Ramp ee Bankiga Que waa [address] [facility] [address]. Bukaanka iyo dadka sindi booqda ee iskood u dhigaa waxay tyrell doonaan qiime dhimista baarkinka isbitaalka. Haddii Ramp Bukaan-socodka/Booqeeyaha uu buuxo, fadlan raac calaamadaha goobta baarkinka gaariga ee ku yaal albaabka weyn ee isbitaalka.     Baarkinka  waa diyaar (24 saacadood/7 maalmood todobaadkii)      Ikhtiyaarada kaadhka baarkinka ee jaban ayaa diyaar u ah bukaanada iyo booqdayaasha. Waxaa laga iibsan karaa miiska khasnajiga ee  ee ku yaal iridda weyn ee isbitaalka.     -Jooji miiska amniga oo waxay u hagaanicholas doonaila bukaanka qalliinka hubinta qalliinka iyo qolka qoyska. [phone]        - Haddii aad u baserge tahay scarletmaamo, kursiga curyaanka ama gelbiyaha fadlan joogso miiska macluumaadka/miiska amniga ee ku dhex yaal hoolka.    Maxaan cuni karaa ama cabbi karaa?  - Waxaad cuni kartaa oo cabbi kartaa si benoit  ilaa 8 saacadood ka hor wakhtiga imaatinka. (Ilaa 9:30 galabnimo ee 25)  - Waxaa laga yaabaa inaangelica hayobinna stanton  ilaa 2 saacadood ka  hor wakhtiga imaatinka. (Ilaa 3:30 subaxnimo 7/15/25)  Examples of clear liquids:    Biyo  maraq cad  Casiirka (tufaaxa, canabka cad, karamberriga cad  iyo cider) oo aan saxar lahayn  Cabitaanada aan kaarboonsanayn, budada ku salaysan  (liin iyo Carlos-Aid)  Soodhaha (Sprite, 7-Up, ashleigh sinjibiil iyo seltzer)  Kafee ama shaah (caano la'aan ama labeen)  Gatorade      - Aalkolo ama xashiishad ma laha ugu yaraan 24 saacadood ka hor qaliinka.    Daawooyinkee nadiya qaadan karaa?    Qabo Aspirin maalinta qalitaanka.   Qabo Multivitamins 7 maalmood ka hor qaliinka.  Qabo Kaabayaasha 7 maalmood ka hor qaliinka.  Qabo Ibuprofen (Advil, Motrin) muddo 7 maalmood ah qalliinka ka hor - ilaa uu si kale u amro dhakhtarka qalliinka.  Qabo Naproxen (Aleve) 7 maalmood ka hor qaliinka.    Sideen isu diyaariyaa?  - Fadlan qaado 2 qubays (anurag habeen ka hor qalliinka iyo anurag subaxda qalliinka) adoo isticmaalaya saabuunta Scrubcare ama Hibiclens.    Isticmaal saabuuntan kaliya qoorta ilaa suulashaada. Ka fogow xubinta taranka      Ku dhaaf saabuunta maqaarkaaga anurag daqiiqo - ka dibna si fiican u biyo raaci.      Waxaa laga yaabaa inaad isticmaasho shaambadaada iyo qaboojiyahaaga. Ma jiraan wax kale oo praveena ah.   - Fadlan ka saar dhammaan dahabka iyo daloolinta jirka.  - Ma jiro lotion, carfyo ama udgoon.  - Ma jiro wax la isku qurxiyo ama ciddiyaha faraha.   Isis qaado aqoonsigaaga iyo jasonarka dieudonnea.    -Bukaanada la dhigayo cisbitaalka daanta kimberly  Xubnaha qoysku waa inay kaxaystaan alaabta bukaanka oo ay dhigaan sanduuqyada lagu bixiyo qolka nasashada ee qoyska.  Fadlan ku koob alaabta aad keento 1 demetra maadaama sanduuqyadu ay kareen yihiin.    [facility] GURIY[facility]DA AYAA ISKU MAALINTII MAALINTA QALIMKA AH AYAA RIGOOGA LEW ROONEYYIHIIN QOF WAYN OO MASUUL AH INUU NOMI OO LA ZABRINA 24 saacadood ka reno qaliinka.    Siyaasadda tijaabada Covid ilaa 12/06/2022  Kelton melendez ogeysiin doona oo ku elva sandy baelinatabaldoa  COVID haddii mid loo baahdo qalliinka ka hor-fadlan ku toosi wixii delgado'aalo ah ama calaamadaha COVID dhakhtarkaaga qalliinka    Delgado'aalo ama walaac:    - Wixii delgado'aalo ah ee ku saabsan maalinta qalliinka ama joogitaanka isbitaalka, fadlan juan miguel xiriir Xafiiska Kalkaaliyaha ee Admission Admission at [phone].     - Haddii aad leedahay isbeddel caafimaad inta u dhaxaysa maanta iyo qalliinka, fadlan wac dhakhtarkaaga qalliinka.     - Delgado'aalaha qalliinka kadib, fadlan wac xafiiska dhakhaatiirta qalliinka.    Tilmaamaha Booqdaha ee Hadda    2 qof oo waaweyn oo sindi booqda bukaanada qaangaarka ah ee aagga pre op    Haddii booqdayaal dheeraad ah yimaadaan (marka laga reebo adeegaha daryeelka bukaanka ama koox bixiyaha guriga), booqdayaasha dheeraadka ah waxaa la weydiin doonaa inay ku sugaan afaafka weyn ee isbitaalka    Saacadaha booqashada: 8 subaxnimo ilaa 8:30 galabnimo     Bukaannada la xaqiijiyay ama looga shakisan yahay inay qabaan calaamadaha COVID 19 ama hargabka:     Booqde looma ogola bukaanka qaangaarka ah.   Carruurta (ka kareen da'da 18) waxay yeelan karaan 1 qof oo booqde ah.     Dadka buka ama muujinaya calaamadaha COVID 19 ama hargabka:    Looma ogola in ay sindi booqdaan bukaanada ---waxaan kaliya samayn karnaa ka reebis xaaladaha gaarka ah.    Fadlan raac tilmaamahan booqashadaada:          Fadlan ilaali fogaanta bulshada          Weji-xidhashadu waa ikhtiyaari - si kastaba brice ahaatee mararka qaarkood waxaa laga yaabaa in lagu weydiiyo inaad xidhato maaskaro badbaadada naftaada iyo kuwa kaleba     Gacmahaaga ku nadiifi gacmo nadiifiyaha khamriga. Olive samee markaad timaado oo aad ka baxdo dhismaha iyo qolka bukaanka,    Mar labaad ka reno markaad taabato maaskaradaada ama wax kasta oo qolka ku jira.    Si toos ah ugu tag oo uga sindi bax qolka aad booqanayso.     Joog qolka bukaanka muddada booqashadaada. Xaddid inaad aado meelaha kale ee cisbitaalka intii suurtogal ah     Bacaha iyo jaakadaha kaga tag guriga  ama nina.     Caafimamark qof kasdrew, fadlan ha u rupert oo ha tegin inta lagu jiro booqashadaada. Taas chuy garcia ah lisbeth   inta lagu guda jiro booqashadaada.

## 2025-07-03 NOTE — H&P
Pre-Operative H & P     CC:  Preoperative exam to assess for increased cardiopulmonary risk while undergoing surgery and anesthesia.    Date of Encounter: 7/3/2025  Primary Care Physician:  Aravind Begum     Reason for visit:   Encounter Diagnoses   Name Primary?    Preop examination Yes    Aortic valve stenosis, etiology of cardiac valve disease unspecified        HPI  Leonel Mack is a 30 year old male who presents for pre-operative H & P in preparation for  Procedure Information       Case: [id number] Date/Time: 07/15/25 0800    Procedures:       REDO STERNOTOMY REDO AORTIC VALVE REPLACEMENT(mechanical) (Chest)      POSSIBLE AORTIC ROOT REPLACEMENT AND ANY ASSOCIATED PROCEDURES (Chest)    Anesthesia type: General    Diagnosis: Aortic stenosis [I35.0]    Pre-op diagnosis: Aortic stenosis [I35.0]    Location:  OR [address] [place] [address] OR    Providers: Francisco Javier Malave MD          Leonel Mack is a 29 yo male with a  history of bioprosthetic aortic valve replacement 2013 for congenital subaortic stenosis of membranous type at the [facility].  Mr. Mack was seen by Dr. Malave earlier this year for further evaluation recent elevated aortic valve gradients.  In the past year, the patient's mean gradient has increased from 25.24 mmHg (4/2024) to 51 mmHg (4/9/2025) showing severe aortic stenosis. The patient does endorse some shortness of breath on exertion. Dr. Malave counseled the patient of the findings and treatment options.  The patient has now been scheduled for the procedure as listed above.      The patient presents to the PAC in person  today in preparation for the above scheduled procedure with comorbid conditions including h/o GIB.     History is obtained from the patient, patient's mom and chart review    Hx of abnormal bleeding or anti-platelet use: denies    Past Medical History  Past Medical History:   Diagnosis Date    Aortic valve stenosis, congenital     s/p aortic valve replacement in 2013     Congenital subaortic stenosis of membranous type     History of GI bleed 2019    Prediabetes     Status post aortic valve replacement 2013       Past Surgical History  Past Surgical History:   Procedure Laterality Date    REPLACE VALVE AORTIC  12/17/2013    Procedure: REPLACE VALVE AORTIC;  Median Sternotomy on pump oxygenator, Aortic Valve Replacement, Transesophegeal Echo Cardiogram by Dr. Bojorquez. ;  Surgeon: Jerome Patel MD;  Location: [facility] OR       Prior to Admission Medications  Current Outpatient Medications   Medication Sig Dispense Refill    acetaminophen (TYLENOL) 325 MG tablet Take 2 tablets (650 mg) by mouth every 6 hours (Patient taking differently: Take 650 mg by mouth as needed.) 100 tablet 1    vitamin A 3 MG (49748 UNITS) capsule Take 10,000 Units by mouth as needed.      Vitamin D, Cholecalciferol, 10 MCG (400 UNIT) TABS Take by mouth as needed.         Allergies  No Known Allergies    Social History  Social History     Socioeconomic History    Marital status:      Spouse name: Not on file    Number of children: 1    Years of education: Not on file    Highest education level: Not on file   Occupational History    Occupation: unemployed   Tobacco Use    Smoking status: Never     Passive exposure: Never    Smokeless tobacco: Never   Substance and Sexual Activity    Alcohol use: Never    Drug use: Never    Sexual activity: Not on file   Other Topics Concern    Not on file   Social History Narrative    Not on file     Social Drivers of Health     Financial Resource Strain: Low Risk  (4/24/2024)    Received from [email or web address] [facility] [employer]    Overall Financial Resource Strain (CARDIA)     Difficulty of Paying Living Expenses: Not very hard   Food Insecurity: Not on file   Transportation Needs: Not on file   Physical Activity: Unknown (4/24/2024)    Received from [email or web address] [facility] [employer]    Exercise Vital Sign     Days of Exercise per Week: 3 days     Minutes of Exercise per  Session: Not on file   Stress: No Stress Concern Present (4/24/2024)    [facility] [facility]    [place] [employer] - Occupational Stress Questionnaire     Feeling of Stress : Not at all   Social Connections: Moderately Integrated (4/24/2024)    Received from [facility] [employer]    Social Connection and Isolation Panel [NHANES]     Frequency of Communication with Friends and Family: More than three times a week     Frequency of Social Gatherings with Friends and Family: Three times a week     Attends Druze Services: 1 to 4 times per year     Active Member of Clubs or Organizations: No     Attends Club or Organization Meetings: Never     Marital Status:    Interpersonal Safety: Not At Risk (4/24/2024)    Received from [facility]    Humiliation, Afraid, Rape, and Kick questionnaire     Fear of Current or Ex-Partner: No     Emotionally Abused: No     Physically Abused: No     Sexually Abused: No   Housing Stability: Unknown (4/24/2024)    Received from [facility]    Housing Stability Vital Sign     Unable to Pay for Housing in the Last Year: No     Number of Times Moved in the Last Year: Not on file     Homeless in the Last Year: Not on file       Family History  Family History   Problem Relation Age of Onset    Anesthesia Reaction No family hx of     Thrombosis No family hx of        Review of Systems  The complete review of systems is negative other than noted in the HPI or here.   Anesthesia Evaluation   Pt has had prior anesthetic. Type: General.    No history of anesthetic complications       ROS/MED HX  ENT/Pulmonary:  - neg pulmonary ROS  (-) tobacco use and recent URI   Neurologic:  - neg neurologic ROS     Cardiovascular:     (+)  - -   -  - -                           valvular problems/murmurs type: AS severe aortic valve stenosis.  s/p bioprosthetic  valve replacement in 2013..    Previous  cardiac testing   Echo: Date: 4/2025 Results:  Limited TTE 4/9/25  Summary:    * Prior study from 04/02/25.     * Limited echocardiogram to evaluate LV function and aortic valve   function.    * Normal left nuclear systolic function with ejection fraction of 55 to   60%.    * 19 mm epic bioprosthetic valve with a peak velocity of 4.5 m/s and mean   gradient 51 mmHg across the valve without significant aortic insufficiency.   Dimensionless index of 0.22.  This suggests severe bioprosthetic aortic   valve   stenosis. This is progressed since prior echo.       TTE 4/2/25  Summary:    * The estimated ejection fraction is 65-70%.     * Left ventricular segmental wall motion is normal.     * The left ventricular diastolic function is normal.     * Normal right ventricular systolic function.     * The aortic valve is bioprosthetic.     * There is severe aortic stenosis with a peak velocity of  411 cm/s, mean   gradient of  41.47 mmHg, aortic valve area of  0.75 cm2, and an NDSI of    0.27.    * There is trace pulmonic regurgitation.     * There is trace mitral regurgitation.     * There is trace tricuspid regurgitation.     * Prior study from 04/19/2024.     * Compared to previous echocardiogram velocity and gradient across the   bioprosthetic aortic valve has increased.     Stress Test:  Date: Results:    ECG Reviewed:  Date: 5/22/25 Results:  Sinus bradycardia   T wave abnormality, consider lateral ischemia   Abnormal ECG   When compared with ECG of 24-Dec-2013 09:11,   Vent. rate has decreased by  23 bpm   ST no longer depressed in Inferior leads   ST elevation has replaced ST depression in Anterior leads   T wave inversion less evident in Inferior leads   T wave inversion less evident in Anterolateral leads   Confirmed by MD DIMITRIS, AILYN ([id number]) on 5/22/2025 7:48:01 PM     Cath:  Date: Results:   (-) taking anticoagulants/antiplatelets, CARPENTER and orthopnea/PND   METS/Exercise Tolerance: >4 METS Comment: Plays  "soccer      Denies any exertional dyspnea or angina.   But does fatigue easily   Hematologic: Comments: thrombocytopenia    (+)      anemia (Blood loss anemia in setting of GIB), history of blood transfusion, no previous transfusion reaction, Known PRBC Antibodies:No - in setting of GIB,   (-) history of blood clots   Musculoskeletal:  - neg musculoskeletal ROS     GI/Hepatic: Comment: History of GI bleed 2019   (-) GERD and liver disease   Renal/Genitourinary:  - neg Renal ROS     Endo:  - neg endo ROS     Psychiatric/Substance Use:  - neg psychiatric ROS     Infectious Disease:  - neg infectious disease ROS     Malignancy:  - neg malignancy ROS     Other:  - neg other ROS          BP 99/69 (BP Location: Right arm, Patient Position: Sitting, Cuff Size: Adult Regular)   Pulse 53   Ht 1.727 m (5' 8\")   Wt 65.9 kg (145 lb 3.2 oz)   SpO2 99%   BMI 22.08 kg/m      Physical Exam   Constitutional: Awake, alert, cooperative, no apparent distress, and appears stated age.  Eyes: Pupils equal, round and reactive to light, extra ocular muscles intact, sclera clear, conjunctiva normal.  HENT: Normocephalic, oral pharynx with moist mucus membranes, dentition in fair repair. No goiter appreciated.   Respiratory: Clear to auscultation bilaterally, no crackles or wheezing.  Cardiovascular: Regular rate and rhythm, normal S1 and S2, and 3/6 systolic  murmur noted.  Carotids +2, no bruits. No edema. Palpable pulses to radial  DP and PT arteries.   GI: Normal bowel sounds, soft, non-distended, non-tender, no masses palpated, no hepatosplenomegaly.    Lymph/Hematologic: No cervical lymphadenopathy and no supraclavicular lymphadenopathy.  Genitourinary:  na  Skin: Warm and dry.    Musculoskeletal: Full ROM of neck. There is no redness, warmth, or swelling of the joints. Gross motor strength is normal.    Neurologic: Awake, alert, oriented to name, place and time. Cranial nerves II-XII are grossly intact. Gait is normal. "   Neuropsychiatric: Calm, cooperative. Normal affect.     Prior Labs/Diagnostic Studies   All labs and imaging pertinent to the visit personally reviewed    Latest Reference Range & Units 05/22/25 13:22   Sodium 135 - 145 mmol/L 137   Potassium 3.4 - 5.3 mmol/L 3.9   Chloride 98 - 107 mmol/L 104   Carbon Dioxide (CO2) 22 - 29 mmol/L 23   Urea Nitrogen 6.0 - 20.0 mg/dL 9.6   Creatinine 0.67 - 1.17 mg/dL 0.77   GFR Estimate >60 mL/min/1.73m2 >90   Calcium 8.8 - 10.4 mg/dL 9.2   Anion Gap 7 - 15 mmol/L 10   Magnesium 1.7 - 2.3 mg/dL 2.3   Albumin 3.5 - 5.2 g/dL 4.4   Protein Total 6.4 - 8.3 g/dL 8.3   Alkaline Phosphatase 40 - 150 U/L 84   ALT 0 - 70 U/L 14   AST 0 - 45 U/L 24   Bilirubin Total <=1.2 mg/dL 0.6   Glucose 70 - 99 mg/dL 90   WBC 4.0 - 11.0 10e3/uL 5.7   Hemoglobin 13.3 - 17.7 g/dL 14.7   Hematocrit 40.0 - 53.0 % 44.5   Platelet Count 150 - 450 10e3/uL 127 (L)   RBC Count 4.40 - 5.90 10e6/uL 5.06   MCV 78 - 100 fL 88   MCH 26.5 - 33.0 pg 29.1   MCHC 31.5 - 36.5 g/dL 33.0   RDW 10.0 - 15.0 % 13.4   INR 0.85 - 1.15  1.00   PT 11.8 - 14.8 Seconds 13.4   PTT 22 - 38 Seconds 27   Color Urine Colorless, Straw, Light Yellow, Yellow  Straw   Appearance Urine Clear  Clear   Glucose Urine Negative mg/dL Negative   Bilirubin Urine Negative  Negative   Ketones Urine Negative mg/dL Negative   Specific Gravity Urine 1.003 - 1.035  >1.005   pH Urine 5.0 - 7.0  7.5 (H)   Protein Albumin Urine Negative mg/dL Negative   Urobilinogen mg/dL Normal mg/dL Normal   Nitrite Urine Negative  Negative   Blood Urine Negative  Negative   Leukocyte Esterase Urine Negative  Negative   WBC Urine <=5 /HPF <1   RBC Urine <=2 /HPF 0   Prealbumin 20.0 - 40.0 mg/dL 17.7 (L)   (L): Data is abnormally low  (H): Data is abnormally high    Hemoglobin A1C   Date Value Ref Range Status   05/22/2025 5.7 (H) <5.7 % Final     Comment:     Normal <5.7%   Prediabetes 5.7-6.4%    Diabetes 6.5% or higher     Note: Adopted from ADA consensus guidelines.        PROCEDURES  CAROTID US 5/22/25                                                                 Impression:     1. Right side:         Degree of stenosis of the internal carotid artery: Normal.     2. Left side:          Degree of stenosis of the internal carotid artery: Normal.     3. Heterogeneous thyroid without distinct nodule.    Computed tomographic angiography of the chest, abdomen and  pelvis without and with contrast including 3D reformations dated  5/22/2025 12:21 PM                                                         Impression:     Postoperative changes of aortic valve replacement. Mild  ectasia/dilatation of the ascending aorta with a maximal central line  diameter of 3.7 cm.     ANTIONE TANNER   EKG/ stress test - if available please see in ROS above   No results found.       No data to display                  The patient's records and results pertinent to the visit personally reviewed by this provider.     Outside records reviewed from: Care Everywhere    LAB/DIAGNOSTIC STUDIES TODAY:     Latest Reference Range & Units 07/03/25 11:53   Sodium 135 - 145 mmol/L 138   Potassium 3.4 - 5.3 mmol/L 3.8   Chloride 98 - 107 mmol/L 106   Carbon Dioxide (CO2) 22 - 29 mmol/L 23   Urea Nitrogen 6.0 - 20.0 mg/dL 10.4   Creatinine 0.67 - 1.17 mg/dL 0.85   GFR Estimate >60 mL/min/1.73m2 >90   Calcium 8.8 - 10.4 mg/dL 9.2   Anion Gap 7 - 15 mmol/L 9   Magnesium 1.7 - 2.3 mg/dL 2.0   Albumin 3.5 - 5.2 g/dL 4.4   Protein Total 6.4 - 8.3 g/dL 8.2   Alkaline Phosphatase 40 - 150 U/L 79   ALT 0 - 70 U/L 8   AST 0 - 45 U/L 22   Bilirubin Total <=1.2 mg/dL 0.7   Glucose 70 - 99 mg/dL 113 (H)   WBC 4.0 - 11.0 10e3/uL 4.4   Hemoglobin 13.3 - 17.7 g/dL 14.0   Hematocrit 40.0 - 53.0 % 41.0   Platelet Count 150 - 450 10e3/uL 138 (L)   RBC Count 4.40 - 5.90 10e6/uL 4.70   MCV 78 - 100 fL 87   MCH 26.5 - 33.0 pg 29.8   MCHC 31.5 - 36.5 g/dL 34.1   RDW 10.0 - 15.0 % 13.0   INR 0.85 - 1.15  1.07   PT 11.8 - 14.8 Seconds  "14.2   PTT 22 - 38 Seconds 27   (H): Data is abnormally high  (L): Data is abnormally low    Assessment    Leonel Mack is a 30 year old male seen as a PAC referral for risk assessment and optimization for anesthesia.    Plan/Recommendations  Pt will be optimized for the proposed procedure.  See below for details on the assessment, risk, and preoperative recommendations    NEUROLOGY  - No history of TIA, CVA or seizure    -Post Op delirium risk factors:  No risk identified    ENT  - No current airway concerns.  Will need to be reassessed day of surgery.  Mallampati: II  TM: > 3    CARDIAC  - Severe aortic stenosis in failing bioprosthetic aortic valve replacement  Patient with a h/o congenital subaortic stenosis of membranous type s/p bioprosthetic aortic valve replacement 2013 at the [facility].    Per most recent echo:  19 mm epic bioprosthetic valve with a peak velocity of 4.5 m/s and mean   gradient 51 mmHg across the valve without significant aortic insufficiency  Preop lab done by Dr. Malave's team earlier today.       - No history of CAD, Hypertension, and Afib    - METS (Metabolic Equivalents)  Patient performs 4 or more METS exercise without symptoms             Total Score: 0      PULMONARY  - MYRNA Low Risk             Total Score: 1    MYRNA: Male      - Denies asthma or inhaler use  - Tobacco History    History   Smoking Status    Never   Smokeless Tobacco    Never       GI  - h/o GIB in 2019 requiring a blood transfusion    - PONV Medium Risk  Total Score: 2           1 AN PONV: Patient is not a current smoker    1 AN PONV: Intended Post Op Opioids        /RENAL  - Baseline Creatinine  see above     ENDOCRINE    - BMI: Estimated body mass index is 22.08 kg/m  as calculated from the following:    Height as of this encounter: 1.727 m (5' 8\").    Weight as of this encounter: 65.9 kg (145 lb 3.2 oz).  Healthy Weight (BMI 18.5-24.9)  - No history of Diabetes Mellitus    HEME  VTE Low Risk 0.5%         "     Total Score: 2    VTE: Male      - No history of abnormal bleeding or antiplatelet use.  - h/o blood loss anemia in setting of GIB  Received a transfusion at that time and denies any reaction     - A type and screen has been ordered for this patient per Dr. Malave's team     MSK  Patient is NOT Frail             Total Score: 0      Different anesthesia methods/types have been discussed with the patient, but they are aware that the final plan will be decided by the assigned anesthesia provider on the date of service.  Patient was discussed with Dr. Jennings    The patient is optimized for their procedure. AVS with information on surgery time/arrival time, meds and NPO status given by nursing staff. No further diagnostic testing indicated.        45 minutes were spent on the date of the encounter performing chart review, history and exam, documentation and/or discussion with other providers about the issues documented above.    SANDRA De Los Santos CNP  Preoperative Assessment Center  [facility]  Clinic and [facility] Center  Phone: [phone]  Fax: [phone]

## 2025-07-06 LAB
ATRIAL RATE - MUSE: 53 BPM
DIASTOLIC BLOOD PRESSURE - MUSE: NORMAL MMHG
INTERPRETATION ECG - MUSE: NORMAL
P AXIS - MUSE: 67 DEGREES
PR INTERVAL - MUSE: 156 MS
QRS DURATION - MUSE: 110 MS
QT - MUSE: 428 MS
QTC - MUSE: 401 MS
R AXIS - MUSE: 10 DEGREES
SYSTOLIC BLOOD PRESSURE - MUSE: NORMAL MMHG
T AXIS - MUSE: 8 DEGREES
VENTRICULAR RATE- MUSE: 53 BPM

## 2025-07-08 NOTE — PHARMACY-ADMISSION MEDICATION HISTORY
Pharmacy Intern Pre-operative Admission Medication History    Admission medication history was completed on July 8, 2025 in anticipation for upcoming surgical admission currently scheduled for 7/15/25. The information provided in this note is only as accurate as the sources available at the time of the update.  Pre-operative nursing staff should still review this list with patient to add last dose information as well as any changes or updates.     Information Source(s): Patient and CareEverywhere/SureScripts via phone    Pertinent Information: Patient reports he does not take any medications     Changes made to PTA medication list:  Added: None  Deleted:   Acetaminophen 325 mg tablet: Take 2 tablets by mouth every 6 hours   Patient reports he does not take this medication. He has in the past for a tooth extraction.  Vitamin A 3mg (90224) capsule: Take 10,000 units by mouth as need  Vitamin D 10 mcg (400 unit) tablets: Take by mouth as needed  Changed: None    Allergies reviewed with patient and updates made in EHR: yes    Medication History Completed By: Libra Villela 7/8/2025 1:33 PM    No outpatient medications have been marked as taking for the 7/15/25 encounter (Hospital Encounter).      
45.3

## 2025-07-09 ENCOUNTER — TELEPHONE (OUTPATIENT)
Dept: CARDIOLOGY | Facility: CLINIC | Age: 30
End: 2025-07-09
Payer: COMMERCIAL

## 2025-07-09 NOTE — TELEPHONE ENCOUNTER
RNCC got verbal confirmation from  of  specialty dental clinic that patient is dentally cleared for surgery

## 2025-07-16 ENCOUNTER — HOSPITAL ENCOUNTER (INPATIENT)
Facility: CLINIC | Age: 30
End: 2025-07-16
Attending: SURGERY | Admitting: SURGERY
Payer: COMMERCIAL

## 2025-07-16 ENCOUNTER — APPOINTMENT (OUTPATIENT)
Dept: GENERAL RADIOLOGY | Facility: CLINIC | Age: 30
End: 2025-07-16
Attending: SURGERY
Payer: COMMERCIAL

## 2025-07-16 ENCOUNTER — ANESTHESIA (OUTPATIENT)
Dept: SURGERY | Facility: CLINIC | Age: 30
End: 2025-07-16
Payer: COMMERCIAL

## 2025-07-16 ENCOUNTER — OFFICE VISIT (OUTPATIENT)
Dept: INTERPRETER SERVICES | Facility: CLINIC | Age: 30
End: 2025-07-16

## 2025-07-16 DIAGNOSIS — Z95.2 STATUS POST MECHANICAL AORTIC VALVE REPLACEMENT: Primary | ICD-10-CM

## 2025-07-16 DIAGNOSIS — T82.857A STENOSIS OF PROSTHETIC AORTIC VALVE, INITIAL ENCOUNTER: ICD-10-CM

## 2025-07-16 DIAGNOSIS — I35.9 AORTIC VALVE DISORDER: ICD-10-CM

## 2025-07-16 DIAGNOSIS — G89.18 ACUTE POST-OPERATIVE PAIN: ICD-10-CM

## 2025-07-16 LAB
ABO + RH BLD: NORMAL
ALBUMIN SERPL BCG-MCNC: 2.8 G/DL (ref 3.5–5.2)
ALLEN'S TEST: ABNORMAL
ALP SERPL-CCNC: 44 U/L (ref 40–150)
ALT SERPL W P-5'-P-CCNC: 10 U/L (ref 0–70)
ANION GAP SERPL CALCULATED.3IONS-SCNC: 8 MMOL/L (ref 7–15)
APTT PPP: 36 SECONDS (ref 22–38)
APTT PPP: 47 SECONDS (ref 22–38)
AST SERPL W P-5'-P-CCNC: 44 U/L (ref 0–45)
ATRIAL RATE - MUSE: 54 BPM
ATRIAL RATE - MUSE: 73 BPM
BASE EXCESS BLDA CALC-SCNC: -0.2 MMOL/L (ref -3–3)
BASE EXCESS BLDA CALC-SCNC: -0.7 MMOL/L (ref -3–3)
BASE EXCESS BLDA CALC-SCNC: -1.3 MMOL/L (ref -3–3)
BASE EXCESS BLDA CALC-SCNC: -1.8 MMOL/L (ref -3–3)
BASE EXCESS BLDA CALC-SCNC: -1.8 MMOL/L (ref -3–3)
BASE EXCESS BLDA CALC-SCNC: -2 MMOL/L (ref -3–3)
BASE EXCESS BLDA CALC-SCNC: -2.1 MMOL/L (ref -3–3)
BASE EXCESS BLDA CALC-SCNC: -2.3 MMOL/L (ref -3–3)
BASE EXCESS BLDA CALC-SCNC: -2.6 MMOL/L (ref -3–3)
BASE EXCESS BLDA CALC-SCNC: -3.7 MMOL/L (ref -3–3)
BASE EXCESS BLDA CALC-SCNC: 0.5 MMOL/L (ref -3–3)
BASE EXCESS BLDV CALC-SCNC: 1.1 MMOL/L (ref -3–3)
BASE EXCESS BLDV CALC-SCNC: 1.1 MMOL/L (ref -3–3)
BILIRUB SERPL-MCNC: 1.4 MG/DL
BLD GP AB SCN SERPL QL: NEGATIVE
BLD PROD TYP BPU: NORMAL
BLOOD COMPONENT TYPE: NORMAL
BUN SERPL-MCNC: 9.9 MG/DL (ref 6–20)
CA-I BLD-MCNC: 4 MG/DL (ref 4.4–5.2)
CA-I BLD-MCNC: 4.1 MG/DL (ref 4.4–5.2)
CA-I BLD-MCNC: 4.1 MG/DL (ref 4.4–5.2)
CA-I BLD-MCNC: 4.2 MG/DL (ref 4.4–5.2)
CA-I BLD-MCNC: 4.3 MG/DL (ref 4.4–5.2)
CA-I BLD-MCNC: 4.4 MG/DL (ref 4.4–5.2)
CA-I BLD-MCNC: 4.9 MG/DL (ref 4.4–5.2)
CALCIUM SERPL-MCNC: 9.1 MG/DL (ref 8.8–10.4)
CHLORIDE SERPL-SCNC: 114 MMOL/L (ref 98–107)
CLOT INIT KAOL IND TO POST HEP NEUT TRTO: 1 {RATIO}
CLOT INIT KAOL IND TO POST HEP NEUT TRTO: 1 {RATIO}
CLOT INIT KAOLIN IND BLD US: 134 SEC (ref 113–166)
CLOT INIT KAOLIN IND BLD US: 150 SEC (ref 113–166)
CLOT INIT KAOLIN IND P HEP NEUT BLD US: 135 SEC (ref 103–153)
CLOT INIT KAOLIN IND P HEP NEUT BLD US: 143 SEC (ref 103–153)
CLOT STIFF PLT CONT BLD CALC: 13 HPA (ref 11.9–29.8)
CLOT STIFF PLT CONT BLD CALC: 7.9 HPA (ref 11.9–29.8)
CLOT STIFF TF IND P HEP NEUT BLD US: 14.6 HPA (ref 13–33.2)
CLOT STIFF TF IND P HEP NEUT BLD US: 8.8 HPA (ref 13–33.2)
CLOT STIFF TF IND+IIB-IIIA INH P HEP NEU: 0.9 HPA (ref 1–3.7)
CLOT STIFF TF IND+IIB-IIIA INH P HEP NEU: 1.6 HPA (ref 1–3.7)
CODING SYSTEM: NORMAL
CREAT SERPL-MCNC: 0.75 MG/DL (ref 0.67–1.17)
CROSSMATCH: NORMAL
DIASTOLIC BLOOD PRESSURE - MUSE: NORMAL MMHG
DIASTOLIC BLOOD PRESSURE - MUSE: NORMAL MMHG
EGFRCR SERPLBLD CKD-EPI 2021: >90 ML/MIN/1.73M2
ERYTHROCYTE [DISTWIDTH] IN BLOOD BY AUTOMATED COUNT: 12.9 % (ref 10–15)
FIBRINOGEN PPP-MCNC: 108 MG/DL (ref 170–510)
GLUCOSE BLD-MCNC: 103 MG/DL (ref 70–99)
GLUCOSE BLD-MCNC: 103 MG/DL (ref 70–99)
GLUCOSE BLD-MCNC: 115 MG/DL (ref 70–99)
GLUCOSE BLD-MCNC: 124 MG/DL (ref 70–99)
GLUCOSE BLD-MCNC: 129 MG/DL (ref 70–99)
GLUCOSE BLD-MCNC: 133 MG/DL (ref 70–99)
GLUCOSE BLD-MCNC: 146 MG/DL (ref 70–99)
GLUCOSE BLD-MCNC: 151 MG/DL (ref 70–99)
GLUCOSE BLD-MCNC: 152 MG/DL (ref 70–99)
GLUCOSE BLD-MCNC: 92 MG/DL (ref 70–99)
GLUCOSE BLDC GLUCOMTR-MCNC: 117 MG/DL (ref 70–99)
GLUCOSE BLDC GLUCOMTR-MCNC: 132 MG/DL (ref 70–99)
GLUCOSE BLDC GLUCOMTR-MCNC: 133 MG/DL (ref 70–99)
GLUCOSE BLDC GLUCOMTR-MCNC: 85 MG/DL (ref 70–99)
GLUCOSE SERPL-MCNC: 142 MG/DL (ref 70–99)
HCO3 BLD-SCNC: 23 MMOL/L (ref 21–28)
HCO3 BLD-SCNC: 24 MMOL/L (ref 21–28)
HCO3 BLD-SCNC: 24 MMOL/L (ref 21–28)
HCO3 BLDA-SCNC: 22 MMOL/L (ref 21–28)
HCO3 BLDA-SCNC: 23 MMOL/L (ref 21–28)
HCO3 BLDA-SCNC: 23 MMOL/L (ref 21–28)
HCO3 BLDA-SCNC: 24 MMOL/L (ref 21–28)
HCO3 BLDA-SCNC: 24 MMOL/L (ref 21–28)
HCO3 BLDA-SCNC: 25 MMOL/L (ref 21–28)
HCO3 BLDV-SCNC: 26 MMOL/L (ref 21–28)
HCO3 BLDV-SCNC: 26 MMOL/L (ref 21–28)
HCO3 SERPL-SCNC: 21 MMOL/L (ref 22–29)
HCT VFR BLD AUTO: 28.9 % (ref 40–53)
HGB BLD-MCNC: 10 G/DL (ref 13.3–17.7)
HGB BLD-MCNC: 10.3 G/DL (ref 13.3–17.7)
HGB BLD-MCNC: 14.1 G/DL (ref 13.3–17.7)
HGB BLD-MCNC: 9 G/DL (ref 13.3–17.7)
HGB BLD-MCNC: 9.3 G/DL (ref 13.3–17.7)
HGB BLD-MCNC: 9.3 G/DL (ref 13.3–17.7)
HGB BLD-MCNC: 9.4 G/DL (ref 13.3–17.7)
HGB BLD-MCNC: 9.5 G/DL (ref 13.3–17.7)
HGB BLD-MCNC: 9.6 G/DL (ref 13.3–17.7)
HGB BLD-MCNC: 9.9 G/DL (ref 13.3–17.7)
INR PPP: 1.49 (ref 0.85–1.15)
INR PPP: 2.58 (ref 0.85–1.15)
INTERPRETATION ECG - MUSE: NORMAL
INTERPRETATION ECG - MUSE: NORMAL
ISSUE DATE AND TIME: NORMAL
LACTATE BLD-SCNC: 0.7 MMOL/L (ref 0.7–2)
LACTATE BLD-SCNC: 0.7 MMOL/L (ref 0.7–2)
LACTATE BLD-SCNC: 0.8 MMOL/L (ref 0.7–2)
LACTATE BLD-SCNC: 0.9 MMOL/L (ref 0.7–2)
LACTATE BLD-SCNC: 0.9 MMOL/L (ref 0.7–2)
LACTATE BLD-SCNC: 1.1 MMOL/L (ref 0.7–2)
LACTATE BLD-SCNC: 1.1 MMOL/L (ref 0.7–2)
LACTATE BLD-SCNC: 1.2 MMOL/L (ref 0.7–2)
LACTATE SERPL-SCNC: 1.5 MMOL/L (ref 0.7–2)
LACTATE SERPL-SCNC: 1.7 MMOL/L (ref 0.7–2)
MAGNESIUM SERPL-MCNC: 2.9 MG/DL (ref 1.7–2.3)
MCH RBC QN AUTO: 30.1 PG (ref 26.5–33)
MCHC RBC AUTO-ENTMCNC: 34.3 G/DL (ref 31.5–36.5)
MCV RBC AUTO: 87 FL (ref 78–100)
MCV RBC AUTO: 88 FL (ref 78–100)
O2/TOTAL GAS SETTING VFR VENT: 100 %
O2/TOTAL GAS SETTING VFR VENT: 100 %
O2/TOTAL GAS SETTING VFR VENT: 40 %
O2/TOTAL GAS SETTING VFR VENT: 50 %
O2/TOTAL GAS SETTING VFR VENT: 80 %
OXYHGB MFR BLDA: 98 % (ref 92–100)
OXYHGB MFR BLDA: 99 % (ref 92–100)
P AXIS - MUSE: 60 DEGREES
P AXIS - MUSE: 71 DEGREES
PCO2 BLD: 35 MM HG (ref 35–45)
PCO2 BLD: 47 MM HG (ref 35–45)
PCO2 BLD: 48 MM HG (ref 35–45)
PCO2 BLDA: 27 MM HG (ref 35–45)
PCO2 BLDA: 33 MM HG (ref 35–45)
PCO2 BLDA: 40 MM HG (ref 35–45)
PCO2 BLDA: 41 MM HG (ref 35–45)
PCO2 BLDA: 41 MM HG (ref 35–45)
PCO2 BLDA: 42 MM HG (ref 35–45)
PCO2 BLDA: 42 MM HG (ref 35–45)
PCO2 BLDA: 43 MM HG (ref 35–45)
PCO2 BLDV: 40 MM HG (ref 40–50)
PCO2 BLDV: 40 MM HG (ref 40–50)
PEEP: 5 CM H2O
PEEP: 5 CM H2O
PH BLD: 7.31 [PH] (ref 7.35–7.45)
PH BLD: 7.32 [PH] (ref 7.35–7.45)
PH BLD: 7.42 [PH] (ref 7.35–7.45)
PH BLDA: 7.32 [PH] (ref 7.35–7.45)
PH BLDA: 7.35 [PH] (ref 7.35–7.45)
PH BLDA: 7.36 [PH] (ref 7.35–7.45)
PH BLDA: 7.37 [PH] (ref 7.35–7.45)
PH BLDA: 7.38 [PH] (ref 7.35–7.45)
PH BLDA: 7.39 [PH] (ref 7.35–7.45)
PH BLDA: 7.43 [PH] (ref 7.35–7.45)
PH BLDA: 7.52 [PH] (ref 7.35–7.45)
PH BLDV: 7.42 [PH] (ref 7.32–7.43)
PH BLDV: 7.42 [PH] (ref 7.32–7.43)
PHOSPHATE SERPL-MCNC: 2.1 MG/DL (ref 2.5–4.5)
PLATELET # BLD AUTO: 120 10E3/UL (ref 150–450)
PO2 BLD: 163 MM HG (ref 80–105)
PO2 BLD: 165 MM HG (ref 80–105)
PO2 BLD: 168 MM HG (ref 80–105)
PO2 BLDA: 120 MM HG (ref 80–105)
PO2 BLDA: 438 MM HG (ref 80–105)
PO2 BLDA: 439 MM HG (ref 80–105)
PO2 BLDA: 447 MM HG (ref 80–105)
PO2 BLDA: 456 MM HG (ref 80–105)
PO2 BLDA: 476 MM HG (ref 80–105)
PO2 BLDA: 481 MM HG (ref 80–105)
PO2 BLDA: 518 MM HG (ref 80–105)
PO2 BLDV: 46 MM HG (ref 25–47)
PO2 BLDV: 46 MM HG (ref 25–47)
POTASSIUM BLD-SCNC: 3.6 MMOL/L (ref 3.4–5.3)
POTASSIUM BLD-SCNC: 3.9 MMOL/L (ref 3.4–5.3)
POTASSIUM BLD-SCNC: 3.9 MMOL/L (ref 3.4–5.3)
POTASSIUM BLD-SCNC: 4.4 MMOL/L (ref 3.4–5.3)
POTASSIUM BLD-SCNC: 4.5 MMOL/L (ref 3.4–5.3)
POTASSIUM BLD-SCNC: 4.8 MMOL/L (ref 3.4–5.3)
POTASSIUM BLD-SCNC: 4.8 MMOL/L (ref 3.4–5.3)
POTASSIUM BLD-SCNC: 5.1 MMOL/L (ref 3.4–5.3)
POTASSIUM BLD-SCNC: 5.5 MMOL/L (ref 3.4–5.3)
POTASSIUM BLD-SCNC: 5.8 MMOL/L (ref 3.4–5.3)
POTASSIUM SERPL-SCNC: 4.7 MMOL/L (ref 3.4–5.3)
PR INTERVAL - MUSE: 144 MS
PR INTERVAL - MUSE: 160 MS
PROT SERPL-MCNC: 4.7 G/DL (ref 6.4–8.3)
PROTHROMBIN TIME: 17.9 SECONDS (ref 11.8–14.8)
PROTHROMBIN TIME: 27.1 SECONDS (ref 11.8–14.8)
QRS DURATION - MUSE: 88 MS
QRS DURATION - MUSE: 96 MS
QT - MUSE: 366 MS
QT - MUSE: 498 MS
QTC - MUSE: 403 MS
QTC - MUSE: 472 MS
R AXIS - MUSE: 31 DEGREES
R AXIS - MUSE: 42 DEGREES
RBC # BLD AUTO: 3.29 10E6/UL (ref 4.4–5.9)
SAO2 % BLDA: 100 % (ref 96–97)
SAO2 % BLDA: 99 % (ref 96–97)
SAO2 % BLDA: 99.8 % (ref 96–97)
SAO2 % BLDA: >100 % (ref 96–97)
SAO2 % BLDV: 83 % (ref 70–75)
SAO2 % BLDV: 83 % (ref 70–75)
SODIUM BLD-SCNC: 139 MMOL/L (ref 135–145)
SODIUM BLD-SCNC: 140 MMOL/L (ref 135–145)
SODIUM BLD-SCNC: 142 MMOL/L (ref 135–145)
SODIUM SERPL-SCNC: 143 MMOL/L (ref 135–145)
SPECIMEN EXP DATE BLD: NORMAL
SYSTOLIC BLOOD PRESSURE - MUSE: NORMAL MMHG
SYSTOLIC BLOOD PRESSURE - MUSE: NORMAL MMHG
T AXIS - MUSE: -52 DEGREES
T AXIS - MUSE: -8 DEGREES
UNIT ABO/RH: NORMAL
UNIT NUMBER: NORMAL
UNIT STATUS: NORMAL
UNIT TYPE ISBT: 7300
VENTRICULAR RATE- MUSE: 54 BPM
VENTRICULAR RATE- MUSE: 73 BPM
WBC # BLD AUTO: 8.6 10E3/UL (ref 4–11)

## 2025-07-16 PROCEDURE — 258N000003 HC RX IP 258 OP 636: Performed by: SURGERY

## 2025-07-16 PROCEDURE — 02RF0JZ REPLACEMENT OF AORTIC VALVE WITH SYNTHETIC SUBSTITUTE, OPEN APPROACH: ICD-10-PCS | Performed by: SURGERY

## 2025-07-16 PROCEDURE — 250N000011 HC RX IP 250 OP 636: Performed by: NURSE ANESTHETIST, CERTIFIED REGISTERED

## 2025-07-16 PROCEDURE — 250N000013 HC RX MED GY IP 250 OP 250 PS 637: Performed by: SURGERY

## 2025-07-16 PROCEDURE — 82805 BLOOD GASES W/O2 SATURATION: CPT

## 2025-07-16 PROCEDURE — 250N000011 HC RX IP 250 OP 636: Performed by: ANESTHESIOLOGY

## 2025-07-16 PROCEDURE — 85730 THROMBOPLASTIN TIME PARTIAL: CPT | Performed by: SURGERY

## 2025-07-16 PROCEDURE — 86900 BLOOD TYPING SEROLOGIC ABO: CPT | Performed by: SURGERY

## 2025-07-16 PROCEDURE — 82330 ASSAY OF CALCIUM: CPT | Performed by: SURGERY

## 2025-07-16 PROCEDURE — 94002 VENT MGMT INPAT INIT DAY: CPT

## 2025-07-16 PROCEDURE — 3E043XZ INTRODUCTION OF VASOPRESSOR INTO CENTRAL VEIN, PERCUTANEOUS APPROACH: ICD-10-PCS | Performed by: SURGERY

## 2025-07-16 PROCEDURE — 410N000004: Performed by: SURGERY

## 2025-07-16 PROCEDURE — 250N000009 HC RX 250

## 2025-07-16 PROCEDURE — 82805 BLOOD GASES W/O2 SATURATION: CPT | Performed by: SURGERY

## 2025-07-16 PROCEDURE — C1768 GRAFT, VASCULAR: HCPCS | Performed by: SURGERY

## 2025-07-16 PROCEDURE — 250N000009 HC RX 250: Performed by: SURGERY

## 2025-07-16 PROCEDURE — 999N000141 HC STATISTIC PRE-PROCEDURE NURSING ASSESSMENT: Performed by: SURGERY

## 2025-07-16 PROCEDURE — 99292 CRITICAL CARE ADDL 30 MIN: CPT | Mod: FS | Performed by: ANESTHESIOLOGY

## 2025-07-16 PROCEDURE — 250N000011 HC RX IP 250 OP 636: Performed by: SURGERY

## 2025-07-16 PROCEDURE — 410N000003 HC PER-PERFUSION 1ST 30 MIN: Performed by: SURGERY

## 2025-07-16 PROCEDURE — 250N000011 HC RX IP 250 OP 636

## 2025-07-16 PROCEDURE — 250N000009 HC RX 250: Performed by: PHYSICIAN ASSISTANT

## 2025-07-16 PROCEDURE — P9045 ALBUMIN (HUMAN), 5%, 250 ML: HCPCS | Mod: JZ

## 2025-07-16 PROCEDURE — 250N000009 HC RX 250: Performed by: ANESTHESIOLOGY

## 2025-07-16 PROCEDURE — 85014 HEMATOCRIT: CPT | Performed by: SURGERY

## 2025-07-16 PROCEDURE — 86923 COMPATIBILITY TEST ELECTRIC: CPT

## 2025-07-16 PROCEDURE — 88305 TISSUE EXAM BY PATHOLOGIST: CPT | Mod: TC | Performed by: SURGERY

## 2025-07-16 PROCEDURE — 999N000065 XR CHEST PORT 1 VIEW

## 2025-07-16 PROCEDURE — 360N000079 HC SURGERY LEVEL 6, PER MIN: Performed by: SURGERY

## 2025-07-16 PROCEDURE — 258N000003 HC RX IP 258 OP 636: Performed by: ANESTHESIOLOGY

## 2025-07-16 PROCEDURE — 85384 FIBRINOGEN ACTIVITY: CPT | Performed by: SURGERY

## 2025-07-16 PROCEDURE — 84295 ASSAY OF SERUM SODIUM: CPT | Performed by: SURGERY

## 2025-07-16 PROCEDURE — 84295 ASSAY OF SERUM SODIUM: CPT

## 2025-07-16 PROCEDURE — 88305 TISSUE EXAM BY PATHOLOGIST: CPT | Mod: 26 | Performed by: PATHOLOGY

## 2025-07-16 PROCEDURE — 71045 X-RAY EXAM CHEST 1 VIEW: CPT | Mod: 26 | Performed by: RADIOLOGY

## 2025-07-16 PROCEDURE — 85018 HEMOGLOBIN: CPT

## 2025-07-16 PROCEDURE — 85018 HEMOGLOBIN: CPT | Performed by: SURGERY

## 2025-07-16 PROCEDURE — 272N000085 HC PACK CELL SAVER CSP: Performed by: SURGERY

## 2025-07-16 PROCEDURE — 33530 CORONARY ARTERY BYPASS/REOP: CPT | Mod: GC | Performed by: SURGERY

## 2025-07-16 PROCEDURE — C1898 LEAD, PMKR, OTHER THAN TRANS: HCPCS | Performed by: SURGERY

## 2025-07-16 PROCEDURE — 83735 ASSAY OF MAGNESIUM: CPT | Performed by: SURGERY

## 2025-07-16 PROCEDURE — 200N000002 HC R&B ICU UMMC

## 2025-07-16 PROCEDURE — 999N000253 HC STATISTIC WEANING TRIALS

## 2025-07-16 PROCEDURE — P9037 PLATE PHERES LEUKOREDU IRRAD: HCPCS | Performed by: SURGERY

## 2025-07-16 PROCEDURE — 83605 ASSAY OF LACTIC ACID: CPT

## 2025-07-16 PROCEDURE — 250N000011 HC RX IP 250 OP 636: Mod: JW | Performed by: SURGERY

## 2025-07-16 PROCEDURE — 93005 ELECTROCARDIOGRAM TRACING: CPT

## 2025-07-16 PROCEDURE — 250N000013 HC RX MED GY IP 250 OP 250 PS 637

## 2025-07-16 PROCEDURE — 85610 PROTHROMBIN TIME: CPT | Performed by: SURGERY

## 2025-07-16 PROCEDURE — 999N000157 HC STATISTIC RCP TIME EA 10 MIN

## 2025-07-16 PROCEDURE — 02UW0JZ SUPPLEMENT THORACIC AORTA, DESCENDING WITH SYNTHETIC SUBSTITUTE, OPEN APPROACH: ICD-10-PCS | Performed by: SURGERY

## 2025-07-16 PROCEDURE — 83605 ASSAY OF LACTIC ACID: CPT | Performed by: SURGERY

## 2025-07-16 PROCEDURE — 370N000017 HC ANESTHESIA TECHNICAL FEE, PER MIN: Performed by: SURGERY

## 2025-07-16 PROCEDURE — 250N000009 HC RX 250: Performed by: NURSE ANESTHETIST, CERTIFIED REGISTERED

## 2025-07-16 PROCEDURE — 250N000024 HC ISOFLURANE, PER MIN: Performed by: SURGERY

## 2025-07-16 PROCEDURE — P9016 RBC LEUKOCYTES REDUCED: HCPCS

## 2025-07-16 PROCEDURE — 5A1221Z PERFORMANCE OF CARDIAC OUTPUT, CONTINUOUS: ICD-10-PCS | Performed by: SURGERY

## 2025-07-16 PROCEDURE — 272N000001 HC OR GENERAL SUPPLY STERILE: Performed by: SURGERY

## 2025-07-16 PROCEDURE — 85396 CLOTTING ASSAY WHOLE BLOOD: CPT

## 2025-07-16 PROCEDURE — 33411 REPLACEMENT OF AORTIC VALVE: CPT | Mod: GC | Performed by: SURGERY

## 2025-07-16 PROCEDURE — 258N000003 HC RX IP 258 OP 636: Performed by: PHYSICIAN ASSISTANT

## 2025-07-16 PROCEDURE — 272N000088 HC PUMP APP ADULT PERFUSION: Performed by: SURGERY

## 2025-07-16 PROCEDURE — 84132 ASSAY OF SERUM POTASSIUM: CPT

## 2025-07-16 PROCEDURE — 99291 CRITICAL CARE FIRST HOUR: CPT | Mod: FS | Performed by: ANESTHESIOLOGY

## 2025-07-16 PROCEDURE — 84100 ASSAY OF PHOSPHORUS: CPT | Performed by: SURGERY

## 2025-07-16 PROCEDURE — C1889 IMPLANT/INSERT DEVICE, NOC: HCPCS | Performed by: SURGERY

## 2025-07-16 PROCEDURE — 82330 ASSAY OF CALCIUM: CPT

## 2025-07-16 DEVICE — VALVE AORTIC REGENT FLEX-CUFF 19MM 19AGFN-756: Type: IMPLANTABLE DEVICE | Site: HEART | Status: FUNCTIONAL

## 2025-07-16 DEVICE — IMPLANTABLE DEVICE: Type: IMPLANTABLE DEVICE | Site: CHEST | Status: FUNCTIONAL

## 2025-07-16 RX ORDER — POLYETHYLENE GLYCOL 3350 17 G/17G
17 POWDER, FOR SOLUTION ORAL DAILY
Status: DISCONTINUED | OUTPATIENT
Start: 2025-07-17 | End: 2025-07-19

## 2025-07-16 RX ORDER — ACETAMINOPHEN 325 MG/1
975 TABLET ORAL EVERY 8 HOURS
Status: DISCONTINUED | OUTPATIENT
Start: 2025-07-16 | End: 2025-07-17

## 2025-07-16 RX ORDER — PROPOFOL 10 MG/ML
5-75 INJECTION, EMULSION INTRAVENOUS CONTINUOUS
Status: DISCONTINUED | OUTPATIENT
Start: 2025-07-16 | End: 2025-07-17

## 2025-07-16 RX ORDER — HEPARIN SODIUM 5000 [USP'U]/.5ML
5000 INJECTION, SOLUTION INTRAVENOUS; SUBCUTANEOUS EVERY 8 HOURS
Status: DISCONTINUED | OUTPATIENT
Start: 2025-07-17 | End: 2025-07-19

## 2025-07-16 RX ORDER — PHENYLEPHRINE HCL IN 0.9% NACL 50MG/250ML
.1-6 PLASTIC BAG, INJECTION (ML) INTRAVENOUS CONTINUOUS
Status: DISCONTINUED | OUTPATIENT
Start: 2025-07-16 | End: 2025-07-16

## 2025-07-16 RX ORDER — NICOTINE POLACRILEX 4 MG
15-30 LOZENGE BUCCAL
Status: DISCONTINUED | OUTPATIENT
Start: 2025-07-16 | End: 2025-07-16

## 2025-07-16 RX ORDER — CEFAZOLIN SODIUM/WATER 2 G/20 ML
2 SYRINGE (ML) INTRAVENOUS
Status: COMPLETED | OUTPATIENT
Start: 2025-07-16 | End: 2025-07-16

## 2025-07-16 RX ORDER — DEXTROSE MONOHYDRATE 25 G/50ML
25-50 INJECTION, SOLUTION INTRAVENOUS
Status: DISCONTINUED | OUTPATIENT
Start: 2025-07-16 | End: 2025-07-23 | Stop reason: HOSPADM

## 2025-07-16 RX ORDER — EPINEPHRINE IN 0.9 % SOD CHLOR 5 MG/250ML
.01-.1 PLASTIC BAG, INJECTION (ML) INTRAVENOUS CONTINUOUS
Status: DISCONTINUED | OUTPATIENT
Start: 2025-07-16 | End: 2025-07-16

## 2025-07-16 RX ORDER — CHLORHEXIDINE GLUCONATE ORAL RINSE 1.2 MG/ML
15 SOLUTION DENTAL EVERY 12 HOURS
Status: DISCONTINUED | OUTPATIENT
Start: 2025-07-16 | End: 2025-07-17

## 2025-07-16 RX ORDER — NICOTINE POLACRILEX 4 MG
15-30 LOZENGE BUCCAL
Status: DISCONTINUED | OUTPATIENT
Start: 2025-07-16 | End: 2025-07-23 | Stop reason: HOSPADM

## 2025-07-16 RX ORDER — OXYCODONE HYDROCHLORIDE 5 MG/1
5 TABLET ORAL EVERY 4 HOURS PRN
Status: DISCONTINUED | OUTPATIENT
Start: 2025-07-16 | End: 2025-07-23 | Stop reason: HOSPADM

## 2025-07-16 RX ORDER — AMOXICILLIN 250 MG
1 CAPSULE ORAL 2 TIMES DAILY
Status: DISCONTINUED | OUTPATIENT
Start: 2025-07-16 | End: 2025-07-23 | Stop reason: HOSPADM

## 2025-07-16 RX ORDER — DEXMEDETOMIDINE HYDROCHLORIDE 4 UG/ML
.1-1.2 INJECTION, SOLUTION INTRAVENOUS CONTINUOUS
Status: DISCONTINUED | OUTPATIENT
Start: 2025-07-16 | End: 2025-07-16

## 2025-07-16 RX ORDER — NALOXONE HYDROCHLORIDE 0.4 MG/ML
0.2 INJECTION, SOLUTION INTRAMUSCULAR; INTRAVENOUS; SUBCUTANEOUS
Status: DISCONTINUED | OUTPATIENT
Start: 2025-07-16 | End: 2025-07-23 | Stop reason: HOSPADM

## 2025-07-16 RX ORDER — FIBRINOGEN (HUMAN) 700-1300MG
1150 KIT INTRAVENOUS
Status: COMPLETED | OUTPATIENT
Start: 2025-07-16 | End: 2025-07-16

## 2025-07-16 RX ORDER — GABAPENTIN 300 MG/1
300 CAPSULE ORAL
Status: COMPLETED | OUTPATIENT
Start: 2025-07-16 | End: 2025-07-16

## 2025-07-16 RX ORDER — PROCHLORPERAZINE MALEATE 5 MG/1
10 TABLET ORAL EVERY 6 HOURS PRN
Status: DISCONTINUED | OUTPATIENT
Start: 2025-07-16 | End: 2025-07-23 | Stop reason: HOSPADM

## 2025-07-16 RX ORDER — LIDOCAINE HYDROCHLORIDE 20 MG/ML
INJECTION, SOLUTION INFILTRATION; PERINEURAL PRN
Status: DISCONTINUED | OUTPATIENT
Start: 2025-07-16 | End: 2025-07-16

## 2025-07-16 RX ORDER — HYDROMORPHONE HYDROCHLORIDE 1 MG/ML
0.5 INJECTION, SOLUTION INTRAMUSCULAR; INTRAVENOUS; SUBCUTANEOUS
Status: DISCONTINUED | OUTPATIENT
Start: 2025-07-16 | End: 2025-07-19

## 2025-07-16 RX ORDER — SODIUM CHLORIDE, SODIUM GLUCONATE, SODIUM ACETATE, POTASSIUM CHLORIDE AND MAGNESIUM CHLORIDE 526; 502; 368; 37; 30 MG/100ML; MG/100ML; MG/100ML; MG/100ML; MG/100ML
INJECTION, SOLUTION INTRAVENOUS CONTINUOUS PRN
Status: DISCONTINUED | OUTPATIENT
Start: 2025-07-16 | End: 2025-07-16

## 2025-07-16 RX ORDER — ONDANSETRON 4 MG/1
4 TABLET, ORALLY DISINTEGRATING ORAL EVERY 6 HOURS PRN
Status: DISCONTINUED | OUTPATIENT
Start: 2025-07-16 | End: 2025-07-21

## 2025-07-16 RX ORDER — SODIUM CHLORIDE, SODIUM LACTATE, POTASSIUM CHLORIDE, CALCIUM CHLORIDE 600; 310; 30; 20 MG/100ML; MG/100ML; MG/100ML; MG/100ML
INJECTION, SOLUTION INTRAVENOUS CONTINUOUS PRN
Status: DISCONTINUED | OUTPATIENT
Start: 2025-07-16 | End: 2025-07-16

## 2025-07-16 RX ORDER — LIDOCAINE 40 MG/G
CREAM TOPICAL
Status: DISCONTINUED | OUTPATIENT
Start: 2025-07-16 | End: 2025-07-16

## 2025-07-16 RX ORDER — VANCOMYCIN HYDROCHLORIDE 1 G/200ML
1000 INJECTION, SOLUTION INTRAVENOUS
Status: COMPLETED | OUTPATIENT
Start: 2025-07-16 | End: 2025-07-16

## 2025-07-16 RX ORDER — HYDRALAZINE HYDROCHLORIDE 20 MG/ML
10 INJECTION INTRAMUSCULAR; INTRAVENOUS EVERY 6 HOURS PRN
Status: DISCONTINUED | OUTPATIENT
Start: 2025-07-16 | End: 2025-07-16

## 2025-07-16 RX ORDER — FIBRINOGEN (HUMAN) 700-1300MG
1150 KIT INTRAVENOUS
Status: DISCONTINUED | OUTPATIENT
Start: 2025-07-16 | End: 2025-07-16

## 2025-07-16 RX ORDER — VANCOMYCIN HYDROCHLORIDE 1 G/200ML
1000 INJECTION, SOLUTION INTRAVENOUS EVERY 12 HOURS
Status: COMPLETED | OUTPATIENT
Start: 2025-07-16 | End: 2025-07-17

## 2025-07-16 RX ORDER — HEPARIN SODIUM 1000 [USP'U]/ML
INJECTION, SOLUTION INTRAVENOUS; SUBCUTANEOUS PRN
Status: DISCONTINUED | OUTPATIENT
Start: 2025-07-16 | End: 2025-07-16

## 2025-07-16 RX ORDER — ONDANSETRON 2 MG/ML
4 INJECTION INTRAMUSCULAR; INTRAVENOUS EVERY 6 HOURS PRN
Status: DISCONTINUED | OUTPATIENT
Start: 2025-07-16 | End: 2025-07-21

## 2025-07-16 RX ORDER — HYDROMORPHONE HYDROCHLORIDE 1 MG/ML
0.3 INJECTION, SOLUTION INTRAMUSCULAR; INTRAVENOUS; SUBCUTANEOUS
Status: DISCONTINUED | OUTPATIENT
Start: 2025-07-16 | End: 2025-07-19

## 2025-07-16 RX ORDER — NITROGLYCERIN 10 MG/100ML
INJECTION INTRAVENOUS PRN
Status: DISCONTINUED | OUTPATIENT
Start: 2025-07-16 | End: 2025-07-16

## 2025-07-16 RX ORDER — NOREPINEPHRINE BITARTRATE 0.06 MG/ML
.01-.15 INJECTION, SOLUTION INTRAVENOUS CONTINUOUS
Status: DISCONTINUED | OUTPATIENT
Start: 2025-07-16 | End: 2025-07-16

## 2025-07-16 RX ORDER — CEFAZOLIN SODIUM 1 G/3ML
1 INJECTION, POWDER, FOR SOLUTION INTRAMUSCULAR; INTRAVENOUS EVERY 8 HOURS
Status: COMPLETED | OUTPATIENT
Start: 2025-07-16 | End: 2025-07-17

## 2025-07-16 RX ORDER — CALCIUM GLUCONATE 20 MG/ML
2 INJECTION, SOLUTION INTRAVENOUS
Status: DISCONTINUED | OUTPATIENT
Start: 2025-07-16 | End: 2025-07-19

## 2025-07-16 RX ORDER — DEXMEDETOMIDINE HYDROCHLORIDE 4 UG/ML
.2-.7 INJECTION, SOLUTION INTRAVENOUS CONTINUOUS
Status: DISCONTINUED | OUTPATIENT
Start: 2025-07-16 | End: 2025-07-17

## 2025-07-16 RX ORDER — NOREPINEPHRINE BITARTRATE 0.06 MG/ML
.01-.6 INJECTION, SOLUTION INTRAVENOUS CONTINUOUS
Status: DISCONTINUED | OUTPATIENT
Start: 2025-07-16 | End: 2025-07-17

## 2025-07-16 RX ORDER — BISACODYL 10 MG
10 SUPPOSITORY, RECTAL RECTAL DAILY PRN
Status: DISCONTINUED | OUTPATIENT
Start: 2025-07-19 | End: 2025-07-22

## 2025-07-16 RX ORDER — FENTANYL CITRATE 50 UG/ML
INJECTION, SOLUTION INTRAMUSCULAR; INTRAVENOUS PRN
Status: DISCONTINUED | OUTPATIENT
Start: 2025-07-16 | End: 2025-07-16

## 2025-07-16 RX ORDER — HYDRALAZINE HYDROCHLORIDE 20 MG/ML
10 INJECTION INTRAMUSCULAR; INTRAVENOUS EVERY 30 MIN PRN
Status: DISCONTINUED | OUTPATIENT
Start: 2025-07-16 | End: 2025-07-19

## 2025-07-16 RX ORDER — CHLORHEXIDINE GLUCONATE ORAL RINSE 1.2 MG/ML
10 SOLUTION DENTAL ONCE
Status: COMPLETED | OUTPATIENT
Start: 2025-07-16 | End: 2025-07-16

## 2025-07-16 RX ORDER — CALCIUM CHLORIDE 100 MG/ML
INJECTION INTRAVENOUS; INTRAVENTRICULAR PRN
Status: DISCONTINUED | OUTPATIENT
Start: 2025-07-16 | End: 2025-07-16

## 2025-07-16 RX ORDER — EPINEPHRINE IN 0.9 % SOD CHLOR 5 MG/250ML
.01-.3 PLASTIC BAG, INJECTION (ML) INTRAVENOUS CONTINUOUS
Status: DISCONTINUED | OUTPATIENT
Start: 2025-07-16 | End: 2025-07-16

## 2025-07-16 RX ORDER — OXYCODONE HYDROCHLORIDE 10 MG/1
10 TABLET ORAL EVERY 4 HOURS PRN
Status: DISCONTINUED | OUTPATIENT
Start: 2025-07-16 | End: 2025-07-23 | Stop reason: HOSPADM

## 2025-07-16 RX ORDER — NALOXONE HYDROCHLORIDE 0.4 MG/ML
0.4 INJECTION, SOLUTION INTRAMUSCULAR; INTRAVENOUS; SUBCUTANEOUS
Status: DISCONTINUED | OUTPATIENT
Start: 2025-07-16 | End: 2025-07-23 | Stop reason: HOSPADM

## 2025-07-16 RX ORDER — PROPOFOL 10 MG/ML
INJECTION, EMULSION INTRAVENOUS CONTINUOUS PRN
Status: DISCONTINUED | OUTPATIENT
Start: 2025-07-16 | End: 2025-07-16

## 2025-07-16 RX ORDER — DEXTROSE MONOHYDRATE 25 G/50ML
25-50 INJECTION, SOLUTION INTRAVENOUS
Status: DISCONTINUED | OUTPATIENT
Start: 2025-07-16 | End: 2025-07-16

## 2025-07-16 RX ORDER — LIDOCAINE 40 MG/G
CREAM TOPICAL
Status: DISCONTINUED | OUTPATIENT
Start: 2025-07-16 | End: 2025-07-23 | Stop reason: HOSPADM

## 2025-07-16 RX ORDER — PANTOPRAZOLE SODIUM 40 MG/1
40 TABLET, DELAYED RELEASE ORAL DAILY
Status: DISCONTINUED | OUTPATIENT
Start: 2025-07-17 | End: 2025-07-18

## 2025-07-16 RX ORDER — FAMOTIDINE 20 MG/1
20 TABLET, FILM COATED ORAL
Status: COMPLETED | OUTPATIENT
Start: 2025-07-16 | End: 2025-07-16

## 2025-07-16 RX ORDER — NOREPINEPHRINE BITARTRATE 0.06 MG/ML
.01-.1 INJECTION, SOLUTION INTRAVENOUS CONTINUOUS
Status: DISCONTINUED | OUTPATIENT
Start: 2025-07-16 | End: 2025-07-16

## 2025-07-16 RX ORDER — CEFAZOLIN SODIUM/WATER 2 G/20 ML
2 SYRINGE (ML) INTRAVENOUS SEE ADMIN INSTRUCTIONS
Status: DISCONTINUED | OUTPATIENT
Start: 2025-07-16 | End: 2025-07-16

## 2025-07-16 RX ORDER — ACETAMINOPHEN 325 MG/1
975 TABLET ORAL ONCE
Status: COMPLETED | OUTPATIENT
Start: 2025-07-16 | End: 2025-07-16

## 2025-07-16 RX ORDER — PROPOFOL 10 MG/ML
INJECTION, EMULSION INTRAVENOUS PRN
Status: DISCONTINUED | OUTPATIENT
Start: 2025-07-16 | End: 2025-07-16

## 2025-07-16 RX ORDER — ASPIRIN 81 MG/1
81 TABLET, CHEWABLE ORAL DAILY
Status: DISCONTINUED | OUTPATIENT
Start: 2025-07-17 | End: 2025-07-23 | Stop reason: HOSPADM

## 2025-07-16 RX ORDER — PROTAMINE SULFATE 10 MG/ML
INJECTION, SOLUTION INTRAVENOUS PRN
Status: DISCONTINUED | OUTPATIENT
Start: 2025-07-16 | End: 2025-07-16

## 2025-07-16 RX ORDER — CALCIUM GLUCONATE 20 MG/ML
1 INJECTION, SOLUTION INTRAVENOUS
Status: DISCONTINUED | OUTPATIENT
Start: 2025-07-16 | End: 2025-07-19

## 2025-07-16 RX ORDER — HEPARIN SOD,PORCINE/0.9 % NACL 30K/1000ML
INTRAVENOUS SOLUTION INTRAVENOUS
Status: DISCONTINUED | OUTPATIENT
Start: 2025-07-16 | End: 2025-07-16

## 2025-07-16 RX ADMIN — PROPOFOL 125 MCG/KG/MIN: 10 INJECTION, EMULSION INTRAVENOUS at 16:10

## 2025-07-16 RX ADMIN — HYDROMORPHONE HYDROCHLORIDE 0.5 MG: 1 INJECTION, SOLUTION INTRAMUSCULAR; INTRAVENOUS; SUBCUTANEOUS at 14:24

## 2025-07-16 RX ADMIN — PROTHROMBIN, COAGULATION FACTOR VII HUMAN, COAGULATION FACTOR IX HUMAN, COAGULATION FACTOR X HUMAN, PROTEIN C, PROTEIN S HUMAN, AND WATER 1000 UNITS: KIT at 14:31

## 2025-07-16 RX ADMIN — Medication 30 MG: at 14:00

## 2025-07-16 RX ADMIN — NOREPINEPHRINE BITARTRATE 0.03 MCG/KG/MIN: 0.06 INJECTION, SOLUTION INTRAVENOUS at 18:59

## 2025-07-16 RX ADMIN — VANCOMYCIN HYDROCHLORIDE 1000 MG: 1 INJECTION, SOLUTION INTRAVENOUS at 19:43

## 2025-07-16 RX ADMIN — PROTAMINE SULFATE 130 MG: 10 INJECTION, SOLUTION INTRAVENOUS at 13:44

## 2025-07-16 RX ADMIN — SODIUM CHLORIDE, SODIUM LACTATE, POTASSIUM CHLORIDE, AND CALCIUM CHLORIDE 500 ML: .6; .31; .03; .02 INJECTION, SOLUTION INTRAVENOUS at 20:22

## 2025-07-16 RX ADMIN — PROPOFOL 40 MCG/KG/MIN: 10 INJECTION, EMULSION INTRAVENOUS at 14:56

## 2025-07-16 RX ADMIN — SODIUM CHLORIDE, SODIUM LACTATE, POTASSIUM CHLORIDE, AND CALCIUM CHLORIDE: .6; .31; .03; .02 INJECTION, SOLUTION INTRAVENOUS at 08:00

## 2025-07-16 RX ADMIN — PROPOFOL 70 MCG/KG/MIN: 10 INJECTION, EMULSION INTRAVENOUS at 16:25

## 2025-07-16 RX ADMIN — FENTANYL CITRATE 50 MCG: 50 INJECTION INTRAMUSCULAR; INTRAVENOUS at 09:52

## 2025-07-16 RX ADMIN — HYDROMORPHONE HYDROCHLORIDE 0.5 MG: 1 INJECTION, SOLUTION INTRAMUSCULAR; INTRAVENOUS; SUBCUTANEOUS at 16:03

## 2025-07-16 RX ADMIN — LIDOCAINE HYDROCHLORIDE 80 MG: 20 INJECTION, SOLUTION INFILTRATION; PERINEURAL at 07:47

## 2025-07-16 RX ADMIN — FENTANYL CITRATE 100 MCG: 50 INJECTION INTRAMUSCULAR; INTRAVENOUS at 13:45

## 2025-07-16 RX ADMIN — Medication 2 G: at 12:35

## 2025-07-16 RX ADMIN — CEFAZOLIN 1 G: 1 INJECTION, POWDER, FOR SOLUTION INTRAMUSCULAR; INTRAVENOUS at 21:13

## 2025-07-16 RX ADMIN — SODIUM CHLORIDE, SODIUM GLUCONATE, SODIUM ACETATE, POTASSIUM CHLORIDE AND MAGNESIUM CHLORIDE: 526; 502; 368; 37; 30 INJECTION, SOLUTION INTRAVENOUS at 07:45

## 2025-07-16 RX ADMIN — PROPOFOL 70 MG: 10 INJECTION, EMULSION INTRAVENOUS at 07:47

## 2025-07-16 RX ADMIN — Medication 100 MG: at 16:15

## 2025-07-16 RX ADMIN — Medication 12.5 MG: at 06:38

## 2025-07-16 RX ADMIN — Medication 2 G: at 08:35

## 2025-07-16 RX ADMIN — AMINOCAPROIC ACID 5 G: 250 INJECTION, SOLUTION INTRAVENOUS at 08:00

## 2025-07-16 RX ADMIN — ACETAMINOPHEN 975 MG: 325 TABLET ORAL at 06:38

## 2025-07-16 RX ADMIN — HYDROMORPHONE HYDROCHLORIDE 0.3 MG: 1 INJECTION, SOLUTION INTRAMUSCULAR; INTRAVENOUS; SUBCUTANEOUS at 16:38

## 2025-07-16 RX ADMIN — FAMOTIDINE 20 MG: 20 TABLET, FILM COATED ORAL at 06:38

## 2025-07-16 RX ADMIN — PHENYLEPHRINE HYDROCHLORIDE 50 MCG: 10 INJECTION INTRAVENOUS at 07:47

## 2025-07-16 RX ADMIN — Medication 50 MG: at 08:56

## 2025-07-16 RX ADMIN — VANCOMYCIN HYDROCHLORIDE 1000 MG: 1 INJECTION, SOLUTION INTRAVENOUS at 08:37

## 2025-07-16 RX ADMIN — Medication 100 MG: at 07:47

## 2025-07-16 RX ADMIN — FENTANYL CITRATE 100 MCG: 50 INJECTION INTRAMUSCULAR; INTRAVENOUS at 08:59

## 2025-07-16 RX ADMIN — FENTANYL CITRATE 150 MCG: 50 INJECTION INTRAMUSCULAR; INTRAVENOUS at 13:57

## 2025-07-16 RX ADMIN — CHLORHEXIDINE GLUCONATE 10 ML: 1.2 SOLUTION ORAL at 06:38

## 2025-07-16 RX ADMIN — DEXMEDETOMIDINE HYDROCHLORIDE 0.2 MCG/KG/HR: 4 INJECTION, SOLUTION INTRAVENOUS at 22:30

## 2025-07-16 RX ADMIN — CALCIUM CHLORIDE INJECTION 1 G: 100 INJECTION, SOLUTION INTRAVENOUS at 14:46

## 2025-07-16 RX ADMIN — NITROGLYCERIN 50 MCG: 10 INJECTION INTRAVENOUS at 16:12

## 2025-07-16 RX ADMIN — NICARDIPINE HYDROCHLORIDE 2.5 MG/HR: 0.2 INJECTION INTRAVENOUS at 16:33

## 2025-07-16 RX ADMIN — PROPOFOL 50 MCG/KG/MIN: 10 INJECTION, EMULSION INTRAVENOUS at 16:46

## 2025-07-16 RX ADMIN — FIBRINOGEN (HUMAN) 1150 MG: KIT INTRAVENOUS at 14:45

## 2025-07-16 RX ADMIN — FENTANYL CITRATE 100 MCG: 50 INJECTION INTRAMUSCULAR; INTRAVENOUS at 07:47

## 2025-07-16 RX ADMIN — CALCIUM CHLORIDE INJECTION 1 G: 100 INJECTION, SOLUTION INTRAVENOUS at 09:41

## 2025-07-16 RX ADMIN — SODIUM CHLORIDE, SODIUM LACTATE, POTASSIUM CHLORIDE, AND CALCIUM CHLORIDE: .6; .31; .03; .02 INJECTION, SOLUTION INTRAVENOUS at 15:41

## 2025-07-16 RX ADMIN — GABAPENTIN 300 MG: 300 CAPSULE ORAL at 06:38

## 2025-07-16 RX ADMIN — NOREPINEPHRINE BITARTRATE 0.01 MCG/KG/MIN: 0.06 INJECTION, SOLUTION INTRAVENOUS at 10:31

## 2025-07-16 RX ADMIN — HEPARIN SODIUM 27000 UNITS: 1000 INJECTION INTRAVENOUS; SUBCUTANEOUS at 09:51

## 2025-07-16 RX ADMIN — Medication 50 MG: at 09:16

## 2025-07-16 RX ADMIN — DEXMEDETOMIDINE HYDROCHLORIDE 0.3 MCG/KG/HR: 4 INJECTION, SOLUTION INTRAVENOUS at 16:46

## 2025-07-16 RX ADMIN — MIDAZOLAM 2 MG: 1 INJECTION INTRAMUSCULAR; INTRAVENOUS at 07:42

## 2025-07-16 RX ADMIN — SODIUM CHLORIDE, SODIUM LACTATE, POTASSIUM CHLORIDE, AND CALCIUM CHLORIDE 1000 ML: .6; .31; .03; .02 INJECTION, SOLUTION INTRAVENOUS at 18:55

## 2025-07-16 RX ADMIN — NITROGLYCERIN 50 MCG: 10 INJECTION INTRAVENOUS at 16:16

## 2025-07-16 RX ADMIN — SODIUM PHOSPHATE, MONOBASIC, MONOHYDRATE AND SODIUM PHOSPHATE, DIBASIC ANHYDROUS 9 MMOL: 142; 276 INJECTION, SOLUTION INTRAVENOUS at 18:55

## 2025-07-16 RX ADMIN — HYDROMORPHONE HYDROCHLORIDE 0.5 MG: 1 INJECTION, SOLUTION INTRAMUSCULAR; INTRAVENOUS; SUBCUTANEOUS at 15:29

## 2025-07-16 RX ADMIN — PROTAMINE SULFATE 20 MG: 10 INJECTION, SOLUTION INTRAVENOUS at 14:37

## 2025-07-16 RX ADMIN — CHLORHEXIDINE GLUCONATE 15 ML: 1.2 SOLUTION ORAL at 19:42

## 2025-07-16 RX ADMIN — DEXMEDETOMIDINE HYDROCHLORIDE 0.3 MCG/KG/HR: 100 INJECTION, SOLUTION INTRAVENOUS at 08:37

## 2025-07-16 RX ADMIN — AMINOCAPROIC ACID 1 G/HR: 250 INJECTION, SOLUTION INTRAVENOUS at 09:10

## 2025-07-16 ASSESSMENT — ACTIVITIES OF DAILY LIVING (ADL)
ADLS_ACUITY_SCORE: 65
ADLS_ACUITY_SCORE: 35
ADLS_ACUITY_SCORE: 35
ADLS_ACUITY_SCORE: 57
ADLS_ACUITY_SCORE: 35
ADLS_ACUITY_SCORE: 65
ADLS_ACUITY_SCORE: 35
ADLS_ACUITY_SCORE: 35
ADLS_ACUITY_SCORE: 65
ADLS_ACUITY_SCORE: 35
ADLS_ACUITY_SCORE: 35
ADLS_ACUITY_SCORE: 57
ADLS_ACUITY_SCORE: 35
ADLS_ACUITY_SCORE: 37
ADLS_ACUITY_SCORE: 35

## 2025-07-16 NOTE — ANESTHESIA CARE TRANSFER NOTE
Patient: Leonel Mack    Procedure: Procedure(s):  Redo Median Sternotomy, Lysis of Adhesions, Cardiopulmonary Bypass, Aortic Root Enlargement with Hemashield size 5.1cm x 7.6cm, Redo Aortic Valve Replacement with [employer] Mechanical Heart Valve Size 19, Intraoperative Transesophageal Echocardiogram per Anesthesia       Diagnosis: Aortic stenosis [I35.0]  Diagnosis Additional Information: No value filed.    Anesthesia Type:   No value filed.     Note:    Oropharynx: endotracheal tube in place and ventilatory support  Level of Consciousness: iatrogenic sedation    Level of Supplemental Oxygen (L/min / FiO2): FiO2 40%  Independent Airway: airway patency not satisfactory and stable  Dentition: dentition unchanged  Vital Signs Stable: post-procedure vital signs reviewed and stable  Report to RN Given: handoff report given  Patient transferred to: ICU    ICU Handoff: Call for PAUSE to initiate/utilize ICU HANDOFF, Identified Patient, Identified Responsible Provider, Reviewed the Pertinent Medical History, Discussed Surgical Course, Reviewed Intra-OP Anesthesia Management and Issues during Anesthesia, Set Expectations for Post Procedure Period and Allowed Opportunity for Questions and Acknowledgement of Understanding      Vitals:  Vitals Value Taken Time   /78    Temp 33.5  C (92.3  F) 07/16/25 16:32   Pulse 51 07/16/25 16:32   Resp 22 07/16/25 16:32   SpO2 100 % 07/16/25 16:32   Vitals shown include unfiled device data.    Electronically Signed By: SANDRA Bryant CRNA  July 16, 2025  4:32 PM

## 2025-07-16 NOTE — ANESTHESIA PROCEDURE NOTES
Arterial Line Procedure Note    Pre-Procedure   Staff -        Anesthesiologist:  Phillip Moody MD       Resident/Fellow: Pat Funk MD       Performed By: fellow       Location: OR       Pre-Anesthestic Checklist: patient identified, IV checked, risks and benefits discussed, informed consent, monitors and equipment checked, pre-op evaluation and at physician/surgeon's request  Timeout:       Correct Patient: Yes        Correct Procedure: Yes        Correct Site: Yes        Correct Position: Yes   Line Placement:   This line was placed Pre Induction starting at 7/16/2025 7:40 AM and ending at 7/16/2025 7:45 AM  Procedure   Procedure: arterial line       Laterality: left       Insertion Site: radial.  Sterile Prep        Standard elements of sterile barrier followed       Skin prep: Chloraprep  Insertion/Injection        Technique: ultrasound guided and Seldinger Technique        1. Ultrasound was used to evaluate the access site.       2. Artery evaluated via ultrasound for patency/adequacy.       3. Using real-time ultrasound the needle/catheter was observed entering the artery/vein.       5. The visualized structures were anatomically normal.       6. There were no apparent abnormal pathologic findings.       Catheter Type/Size: 20 G, 12 cm  Narrative         Secured by: suture       Tegaderm and Biopatch dressing used.       Complications: None apparent,        Arterial waveform: Yes        IBP within 10% of NIBP: Yes       Detail Level: Zone

## 2025-07-16 NOTE — BRIEF OP NOTE
"[facility]    Brief Operative Note    Pre-operative diagnosis: Aortic stenosis [I35.0]  Post-operative diagnosis Same as pre-operative diagnosis    Procedure: Redo Median Sternotomy, Lysis of Adhesions, Cardiopulmonary Bypass, Aortic Root Enlargement with Hemashield size 5.1cm x 7.6cm, Redo Aortic Valve Replacement with [employer] Mechanical Heart Valve Size 19, Intraoperative Transesophageal Echocardiogram per Anesthesia, N/A - Chest    Surgeon: Surgeons and Role:     * Francisco Javier Malave MD - Primary     * Zoya Colbert MD - Assisting     * Chevy Hickman MD - Fellow - Assisting  Anesthesia: General   Estimated Blood Loss: 1000ml    Drains: 2x mediastinal, 1x left pleural CT    Specimens:   ID Type Source Tests Collected by Time Destination   1 : Explant Aortic Valve Implant Explant SURGICAL PATHOLOGY EXAM Francisco Javier Malave MD 7/16/2025 11:53 AM      Findings:   Prior AV with stenosis. Low coronaries. AVR with 19mm SJM mechanical valve. Patch repair of aorta  .  Complications: None.  Implants:   Implant Name Type Inv. Item Serial No.  Lot No. LRB No. Used Action   VALVE AORTIC EPIC SUPRA STENTED PORCINE 19MM [id number] - [id number]  VALVE AORTIC EPIC SUPRA STENTED PORCINE 19MM [id number] [employer]  N/A 1 Explanted   GRAFT HEMASHIELD 2X3\" 0.46MM [id number] - [id number] Graft GRAFT HEMASHIELD 2X3\" 0.46MM [id number] N/A 1 Implanted   VALVE AORTIC REGENT FLEX-CUFF 19MM [id number] - [id number] Valve VALVE AORTIC REGENT FLEX-CUFF 19MM [id number] [employer]  N/A 1 Implanted             "

## 2025-07-16 NOTE — ANESTHESIA PROCEDURE NOTES
Central Line/PA Catheter Placement    Pre-Procedure   Staff -        Anesthesiologist:  Phillip Moody MD       Resident/Fellow: Pat Funk MD       Performed By: fellow       Location: OR       Pre-Anesthestic Checklist: patient identified, IV checked, site marked, risks and benefits discussed, informed consent, monitors and equipment checked, pre-op evaluation and at physician/surgeon's request  Timeout:       Correct Patient: Yes        Correct Procedure: Yes        Correct Site: Yes        Correct Position: Yes        Correct Laterality: Yes   Line Placement:   This line was placed Post Induction    Procedure   Procedure: central line       Laterality: right       Insertion Site: internal jugular.  Sterile Prep        All elements of maximal sterile barrier technique followed       Patient Prep/Sterile Barriers: draped, hand hygiene, gloves , hat , mask , draped, gown, sterile gel and probe cover       Skin prep: Chloraprep  Insertion/Injection        Technique: ultrasound guided and Seldinger Technique        1. Ultrasound was used to evaluate the access site.       2. Vein evaluated via ultrasound for patency/adequacy.       3. Using real-time ultrasound the needle/catheter was observed entering the artery/vein.       4. Permanent image was captured and entered into the patient's record.       5. The visualized structures were anatomically normal.       6. There were no apparent abnormal pathologic findings.       Introducer Type: 9 Fr, 2-lumen MAC        Type: CVC       Catheter Size: 9 Fr       Number of Lumens: double lumen       PA Catheter Type: None  Narrative         Secured by: suture       Tegaderm and Biopatch dressing used.       Complications: None apparent,        blood aspirated from all lumens,        All lumens flushed: Yes       Verification method: BYRON       Tip termination: The catheter tip was threaded to reside in the right atrium subject to post surgical x-ray

## 2025-07-16 NOTE — ANESTHESIA PROCEDURE NOTES
Airway       Patient location during procedure: OR       Procedure Start/Stop Times: 7/16/2025 7:48 AM  Staff -        Anesthesiologist:  Phillip Moody MD       Resident/Fellow: Pat Funk MD       Performed By: fellow  Consent for Airway        Urgency: elective  Indications and Patient Condition       Indications for airway management: arsenio-procedural       Induction type:intravenous       Mask difficulty assessment: 1 - vent by mask    Final Airway Details       Final airway type: endotracheal airway       Successful airway: ETT - single  Endotracheal Airway Details        ETT size (mm): 8.0       Cuffed: yes       Successful intubation technique: direct laryngoscopy       DL Blade Type: MAC 4       Grade View of Cords: 2       Position: Right       Measured from: gums/teeth       Secured at (cm): 22       Bite block used: Soft    Post intubation assessment        Placement verified by: capnometry, equal breath sounds and chest rise        Number of attempts at approach: 1       Number of other approaches attempted: 0       Secured with: tape       Ease of procedure: easy       Dentition: Intact    Medication(s) Administered   Medication Administration Time: 7/16/2025 7:48 AM

## 2025-07-16 NOTE — H&P
CV ICU H&P  07/16/2025        ASSESSMENT: Leonel Mack is a 30 year old male with PMH of history of bioprosthetic aortic valve replacement 2013 for congenital subaortic stenosis of membranous type at the [facility], history of GI bleed.     Presents to the CVICU s/p Redo Median Sternotomy, Lysis of Adhesions, Cardiopulmonary Bypass, Aortic Root Enlargement with Hemashield size 5.1cm x 7.6cm, Redo Aortic Valve Replacement with [employer] Mechanical Heart Valve Size 19 on 7/16 by Dr. Malave.    CO-MORBIDITIES:   Patient Active Problem List   Diagnosis    Subaortic membrane    Acute respiratory failure (H)    Aortic valve disorder    Acute post-operative pain         Intra-op:   - Arrived @ *** on ***   - EBL: *** // Cell-saver: *** // Blood products: *** // Kcentra: *** // Fibryga: ***   - UOP: *** // Given *** IVF throughout case  - Hemodynamic goals post-op: ***         PLAN:  Neuro/ pain/ sedation:  - Monitor neurological status. Notify the MD for any acute changes in exam.    # Acute postoperative pain  - Fentanyl infusion ***   - Scheduled: Tylenol  - PRN: Tylenol, oxycodone, Dilaudid    # Sedation while on mechanical ventilation   - Gtt: Propofol, precedex   - Wean for RASS goal ***     Pulmonary care:   # Post-operative mechanical ventilation   Resp: 18   - Goal SpO2 > 92%  - CXR on arrival, then daily   - Monitor CT output   - Wean vent as able; Trend ABG's     Cardiovascular:    # Cardiogenic shock  # Redo Aortic Valve Replacement  Pre CPBP:  Post CPBP:  - Monitor hemodynamics closely  - Goal MAP >65, SBP <140   - Epi, NE, Vaso gtts for inotropic and pressor support, wean as able  - Hold PTA ***  - Hold Statin   - Hold BB   - ASA: start tomorrow***  - Cap TPW when able post-op     GI care / Nutrition:   # At risk for protein calorie malnutrition   - NPO, bedside swallow eval once extubated  - PPI  - Last BM: PTA. Bowel regimen: MiraLAX, senna  - OG tube for meds ***     Renal / Fluids /  Electrolytes:   # Hypovolemia    BL creat appears to be ~ 0.85  - Strict I/O, daily weights, avoid/limit nephrotoxins  - Replete lytes PRN per protocol  - Trend lactate     Endocrine:    # Stress hyperglycemia  Preop A1c ***  - Insulin gtt  - Goal BG <180 for optimal healing    ID / Antibiotics:  # Stress induced leukocytosis  - To complete perioperative regimen  - Monitor fever curve, WBC, and inflammatory markers as appropriate    Heme:     # Acute blood loss anemia  # Acute thrombocytopenia  No s/sx active bleeding  - Trending CBC   - Hgb goal > 7.0, transfuse PRN   - Hemoglobin ***    MSK / Skin:  # Sternotomy ***   # Surgical Incision  - Sternal precautions, postop incision management protocol  - PT/OT/CR    Prophylaxis:     - Mechanical DVT ppx  - Chemical DVT ppx: start SQH tomorrow***  - PPI    Lines / Tubes / Drains:  - ETT  - RIJ CVC, PA catheter  - Arterial Line  - CTs x***  - Santoro  - OG    Disposition:  - CVICU      Patient seen, findings and plan discussed with CVICU staff and cardiothoracic surgeons and fellow.    I spent a total of *** minutes providing critical care services at the bedside, and on the critical care unit, evaluating the patient, directing care and reviewing laboratory values and radiologic reports for the patient. Billing time separate from procedural time.         Giorgio Zhu    Clinically Significant Risk Factors Present on Admission   { TIP  This section helps capture the illness of the patient on admission.     - Review diagnoses highlighted in blue; right click, edit & delete if not appropriate   - If blank, no additional diagnoses identified   :[id number]}     # Hyperkalemia: Highest K = 5.8 mmol/L in last 2 days, will monitor as appropriate    # Hypocalcemia: Lowest iCa = 4 mg/dL in last 2 days, will monitor and replace as appropriate      # Coagulation Defect: INR = 2.58 (Ref range: 0.85 - 1.15) and/or PTT = 47 Seconds (Ref range: 22 - 38 Seconds), will monitor for bleeding          # Anemia: based on hgb <11               # Anemia: based on hgb <11           ====================================    HPI:   Presents to CVICU intubated and sedated.      PAST MEDICAL HISTORY:   Past Medical History:   Diagnosis Date    Aortic valve stenosis, congenital     s/p aortic valve replacement in 2013    Congenital subaortic stenosis of membranous type     History of GI bleed 2019    Prediabetes     Status post aortic valve replacement 2013       PAST SURGICAL HISTORY:   Past Surgical History:   Procedure Laterality Date    REPLACE VALVE AORTIC N/A 12/17/2013    Procedure: Median Sternotomy on pump oxygenator, Aortic Valve Replacement, Transesophegeal Echo Cardiogram by Dr. Bojorquez.;  Surgeon: Jerome Patel MD;  Location: [facility] OR       FAMILY HISTORY:   Family History   Problem Relation Age of Onset    Anesthesia Reaction No family hx of     Thrombosis No family hx of        SOCIAL HISTORY:   Social History     Tobacco Use    Smoking status: Never     Passive exposure: Never    Smokeless tobacco: Never   Substance Use Topics    Alcohol use: Never         OBJECTIVE:  1. VITAL SIGNS:   Temp:  [98  F (36.7  C)] 98  F (36.7  C)  Pulse:  [57] 57  Resp:  [18] 18  BP: (109)/(97) 109/97  SpO2:  [100 %] 100 %    Resp: 18      2. INTAKE/ OUTPUT:   No intake/output data recorded.      3. PHYSICAL EXAMINATION:   Neuro: ***Intubated. Sedated. Unable to assess. NAD.   HEENT: Normocephalic, atraumatic. PERRL, and nonicteric.   CV: ***RRR on monitor, S1S2, all extremities well perfused   Respiratory: Normal respiratory effort on ***, equal chest rise b/l   GI: Abdomen soft, Non-distended   : Voiding with Santoro ***  MSK: Unable to assess.     Skin: *** Sternal incision with dressings in place clean/dry/intact  Normal color. No rashes or skin lesions.         4. INVESTIGATIONS:   Arterial Blood Gases   Recent Labs   Lab 07/16/25  1356 07/16/25  1315 07/16/25  1248 07/16/25  1222   PH 7.32* 7.35 7.36 7.37   PCO2  43 42 42 40   PO2 481* 439* 438* 447*   HCO3 22 23 24 23     Complete Blood Count   Recent Labs   Lab 07/16/25  1356 07/16/25  1315 07/16/25  1248 07/16/25  1222   HGB 9.3* 10.0* 9.3* 9.4*     Basic Metabolic Panel  Recent Labs   Lab 07/16/25  1356 07/16/25  1315 07/16/25  1248 07/16/25  1222    139 139 139   POTASSIUM 4.5 5.1 5.5* 5.8*   * 152* 151* 146*     Liver Function Tests  Recent Labs   Lab 07/16/25  1355   INR 2.58*     Pancreatic Enzymes  No lab results found in last 7 days.  Coagulation Profile  Recent Labs   Lab 07/16/25  1355   INR 2.58*   PTT 47*         5. RADIOLOGY:     No results found for this or any previous visit (from the past 24 hours).    =========================================

## 2025-07-16 NOTE — H&P
CV ICU H&P  7/16/2025  5:44 AM     ASSESSMENT: Leonel Mack is a 30 year old male with PMH of bioprosthetic aortic valve replacement (2013) for congenital subaortic stenosis, GIB, and thrombocytopenia. Presents to [facility] for re-do aortic valve replacement w mechanical valve and aortic root enlargement by Dr. Malave.      CO-MORBIDITIES:   Patient Active Problem List   Diagnosis    Subaortic membrane    Acute respiratory failure (H)    Aortic valve disorder    Acute post-operative pain    Stenosis of prosthetic aortic valve       Intra-op:     - Arrived @ 1615 on 7/16/2025, intubated, sedated   - Cell-saver: 255 ml // Blood products: 1 platelet  // Kcentra: 1 // Fibryga: 1   - UOP: 2 L : // Given 4L IVF throughout case  - Hemodynamic goals post-op: sbp less than 130  - Plan for fast track    PLAN:  Neuro/ pain/ sedation:  - Monitor neurological status. Notify the MD for any acute changes in exam.    # Acute postoperative pain  - Scheduled: Tylenol  - PRN: Tylenol, oxycodone, Dilaudid    # Sedation while on mechanical ventilation   - Gtt: Propofol, precedex   - Wean for RASS goal     Pulmonary care:   # Post-operative mechanical ventilation   Resp: 18   - Goal SpO2 > 92%  - Encourage IS q15-30 minutes when awake after extubation   - CXR on arrival, then daily   - Monitor CT output   - Wean vent as able; Trend ABG's     Cardiovascular:    # Cardiogenic shock  # S/p re-do aortic valve replacement w mechanical valve and aortic root enlargement  # S/p bioprosthetic aortic valve replacement (2013) for congenital subaortic stenosis  - Monitor hemodynamics closely  - Goal MAP >65, SBP <130; nicardipine and hydralazine prn   - Epi, NE, Vaso gtts for inotropic and pressor support, wean as able    GI care / Nutrition:   # At risk for protein calorie malnutrition   - NPO, bedside swallow eval once extubated  - PPI  - Bowel regimen: MiraLAX, senna  - OG tube for meds     Renal / Fluids / Electrolytes:   BL creat appears to be ~  0.7  - Strict I/O, daily weights, avoid/limit nephrotoxins  - Replete lytes PRN per protocol  - Trend lactate, bmp, mag and phos     Endocrine:    # Stress hyperglycemia  - Insulin gtt  - Goal BG <180 for optimal healing    ID / Antibiotics:  # Stress induced leukocytosis  - To complete perioperative regimen  - Monitor fever curve, WBC, and inflammatory markers as appropriate    Heme:     # Elevated INR intra- op 2.58, normal pre op  # Acute blood loss anemia  # Acute thrombocytopenia  No s/sx active bleeding  - Trending CBC   - Hgb goal > 7.0  - Hemoglobin 9.9    MSK / Skin:  # Sternotomy   # Surgical Incision  - Sternal precautions, postop incision management protocol  - PT/OT/CR    Prophylaxis:     - Mechanical DVT ppx  - Chemical DVT ppx: subcutaneous heparin tomorrow and likely warfarin tomorrow night.  - PPI    Lines / Tubes / Drains:  - ETT  - RIJ CVC, PA catheter  - Arterial Line  - CTs x 3; meds x 2, pleural x 1  - Santoro  - OG    Disposition:  - CVICU      Patient seen, findings and plan discussed with CVICU staff and cardiothoracic surgeons and fellow.    I spent a total of 45 minutes providing critical care services at the bedside, and on the critical care unit, evaluating the patient, directing care and reviewing laboratory values and radiologic reports for the patient. Billing time separate from procedural time.     SANDRA Ordonez CNP        ====================================    HPI:   Presents to CVICU intubated and sedated.      PAST MEDICAL HISTORY:   Past Medical History:   Diagnosis Date    Aortic valve stenosis, congenital     s/p aortic valve replacement in 2013    Congenital subaortic stenosis of membranous type     History of GI bleed 2019    Prediabetes     Status post aortic valve replacement 2013       PAST SURGICAL HISTORY:   Past Surgical History:   Procedure Laterality Date    REPLACE VALVE AORTIC N/A 12/17/2013    Procedure: Median Sternotomy on pump oxygenator, Aortic Valve  Replacement, Transesophegeal Echo Cardiogram by Dr. Bojorquez.;  Surgeon: Jerome Patel MD;  Location: [facility] OR       FAMILY HISTORY:   Family History   Problem Relation Age of Onset    Anesthesia Reaction No family hx of     Thrombosis No family hx of        SOCIAL HISTORY:   Social History     Tobacco Use    Smoking status: Never     Passive exposure: Never    Smokeless tobacco: Never   Substance Use Topics    Alcohol use: Never         OBJECTIVE:  1. VITAL SIGNS:   Temp:  [98  F (36.7  C)] 98  F (36.7  C)  Pulse:  [57] 57  Resp:  [18] 18  BP: (109)/(97) 109/97  SpO2:  [100 %] 100 %    Resp: 18      2. INTAKE/ OUTPUT:   I/O last 3 completed shifts:  In: 3020 [I.V.:2020; Other:255; IV Piggyback:300]  Out: 2800 [Urine:2800]      3. PHYSICAL EXAMINATION:     Physical Exam  Vitals and nursing note reviewed.   Constitutional:       Appearance: Normal appearance.      Interventions: He is sedated and intubated.   Cardiovascular:      Rate and Rhythm: Normal rate and regular rhythm.      Pulses:           Radial pulses are 2+ on the right side and 2+ on the left side.        Dorsalis pedis pulses are 2+ on the right side and 2+ on the left side.      Heart sounds: Normal heart sounds.   Pulmonary:      Effort: Pulmonary effort is normal. He is intubated.      Breath sounds: Normal breath sounds.   Musculoskeletal:      Right lower leg: No edema.      Left lower leg: No edema.   Skin:     General: Skin is warm and dry.      Capillary Refill: Capillary refill takes less than 2 seconds.      Comments: Sternal incision c/d/I, 3 chest tubes    Neurological:      Comments: sedated           4. INVESTIGATIONS:   Arterial Blood Gases   Recent Labs   Lab 07/16/25  1536 07/16/25  1356 07/16/25  1315 07/16/25  1248   PH 7.43 7.32* 7.35 7.36   PCO2 33* 43 42 42   PO2 120* 481* 439* 438*   HCO3 22 22 23 24     Complete Blood Count   Recent Labs   Lab 07/16/25  1536 07/16/25  1356 07/16/25  1315 07/16/25  1248   HGB 10.3* 9.3*  10.0* 9.3*     Basic Metabolic Panel  Recent Labs   Lab 07/16/25  1536 07/16/25  1356 07/16/25  1315 07/16/25  1248    139 139 139   POTASSIUM 4.4 4.5 5.1 5.5*   * 129* 152* 151*     Liver Function Tests  Recent Labs   Lab 07/16/25  1355   INR 2.58*     Pancreatic Enzymes  No lab results found in last 7 days.  Coagulation Profile  Recent Labs   Lab 07/16/25  1355   INR 2.58*   PTT 47*         5. RADIOLOGY:       Recent Results (from the past 24 hours)   BYRON with Report    Narrative    Pat Funk MD     7/16/2025  3:04 PM  Perioperative BYRON Procedure Note    Staff -        Anesthesiologist:  Phillip Moody MD       Resident/Fellow: Pat Funk MD       Performed By: fellow  Preanesthesia Checklist:  Patient identified, IV assessed, risks and   benefits discussed, monitors and equipment assessed, procedure being   performed at surgeon's request and anesthesia consent obtained.    BYRON Probe Insertion    Probe Status PRE Insertion: NO obvious damage  Probe type:  Adult 3D  Bite block used:   Oral Airway  Insertion Technique: Jaw Lift  Insertion complications: None obvious  Billing Report:BYRON report by Anesthesiologist (See Separate Report note)  Probe Status POST Removal: NO obvious damage    BYRON Report  General Procedure Information  Images for this study have been archived.  Modalities: 2D, 3D, CW Doppler, Color flow mapping and PW Doppler  Diagnostic Indications comments: Assessment of valvular and systolic   function..  Echocardiographic and Doppler Measurements  Right Ventricle:  Cavity size normal.    Hypertrophy not present.     Thrombus not present.    Global function normal.     Left Ventricle:  Cavity size normal.    Hypertrophy present.   Thrombus   not present.   Global Function normal.   Ejection Fraction 60%.      Ventricular Regional Function:  1- Basal Anteroseptal:  normal  2- Basal Anterior:  normal  3- Basal Anterolateral:  normal  4- Basal Inferolateral:  normal  5- Basal  Inferior:  normal  6- Basal Inferoseptal:  normal  7- Mid Anteroseptal:  normal  8- Mid Anterior:  normal  9- Mid Anterolateral:  normal  10- Mid Inferolateral:  normal  11- Mid Inferior:  normal  12- Mid Inferoseptal:  normal  13- Apical Anterior:  normal  14- Apical Lateral:  normal  15- Apical Inferior:  normal  16- Apical Septal:  normal  17- Josephine:  normal    Valves  Aortic Valve: Annulus bioprosthetic.  Stenosis moderate.  Regurgitation   absent.  Leaflets thickened.  Leaflet motions restricted.    Mitral Valve: Annulus normal.  Stenosis not present.  Regurgitation   absent.  Leaflets normal.  Leaflet motions normal.    Tricuspid Valve: Annulus normal.  Stenosis not present.  Regurgitation   absent.  Leaflets normal.  Leaflet motions normal.    Pulmonic Valve: Annulus normal.  Stenosis not present.  Regurgitation +1.        Aorta: Ascending Aorta: Size normal.  Dissection not present.  Plaque   thickness less than 3 mm.  Mobile plaque not present.    Aortic Arch: Size normal.    Dissection not present.   Plaque thickness   less than 3 mm.   Mobile plaque not present.    Descending Aorta: Size normal.    Dissection not present.   Plaque   thickness less than 3 mm.   Mobile plaque not present.      Right Atrium:  Size normal.   Spontaneous echo contrast not present.     Thrombus not present.   Tumor not present.   Device not present.     Left Atrium: Size normal.  Spontaneous echo contrast not present.    Thrombus not present.  Tumor not present.  Device not present.    Left atrial appendage normal.     Atrial Septum: Intra-atrial septal morphology normal.       Ventricular Septum: Intra-ventricular septum morphology normal.      Diastolic Function Measurements:  Diastolic Dysfunction Grade= II.  E=  62.5 ms.  A=  29.8 ms.  E/A Ratio=    2.1.  DT=  233 ms.  S/D=  0.5.  IVRT= ms.  Other Findings:   Pericardium:  normal. Pleural Effusion:  none. Pulmonary Arteries:    normal. Pulmonary Venous Flow:  blunted  (decreased) systolic flow.   Cornoary sinus catheter present. Coronary sinus size (mm):  5.   Post Intervention Findings  Procedure(s) performed:  Aortic Valve Repair/Replace.  Regional wall   motion:. Surgeon(s) notified of all postintervention findings: Yes (In   real time).   Aortic Valve: Valve replaced with mechanical valve.               Echocardiogram Comments  Echocardiogram comments:   PRE-PROCEDURE:  Intraoperative BYRON -- 30 year old male undergoing redo AVR, root   enlargement:  At time of assessment, patient was under general anesthesia.   Rhythm: SB / NSR  LV: Normal chamber size, concentric LVH. Preserved systolic function, LVEF   55-60% by qualitative assessment. No RWMA.  RV: Normal chamber size. Preserved systolic function.  Aorta: No aneurysm, no dissection, no mobile plaque.  AV: Preexisting bioprosthetic valve. Appears well seated and without PVL.   Leaflets difficult to visualize. Severe prosthetic valve stenosis (MG 27   mmHg; PG Vmax 3.63 m/s; VR 0.19).   MV: Normal leaflet morphology with normal coaptation and motion. No   regurgitation or stenosis. Blunted systolic pulmonary vein flow.  PV: Limited visualization. Trace PI.  TV: Normal leaflet morphology with normal coaptation and motion. No   regurgitation or stenosis. Normal hepatic vein flow.  Pleura: No pleural effusion.  IAS: No evidence of PFO by color flow doppler.  SILVANA without evidence of thrombus by color flow doppler.  All findings communicated to surgical team.       POST-PROCEDURE:  S/p redo AVR with 19 mm [employer] mechanical valve + aortic root   enlargement with bovine pericardial patch.  Rhythm: NSR  Preserved biventricular function, unchanged. LV EF 55-60%  AV: S/p AVR with 19 mm mechanical valve. Appears well seated, opens/closes   well, no evidence of paravalvular leak. MG 8 mmHg.  Valves: No other interval valvulopathies.  Aorta: No interval changes. No evidence of aortic dissection s/p   decannulation.  Pericardium: No  interval pericardial effusion.   Pleura: No interval pleural effusion.  The remainder of the post-intervention echocardiographic exam was   unchanged from baseline. All findings discussed with surgical team.       =========================================

## 2025-07-16 NOTE — ANESTHESIA PROCEDURE NOTES
Perioperative BYRON Procedure Note    Staff -        Anesthesiologist:  Phillip Moody MD       Resident/Fellow: Pat Funk MD       Performed By: fellow  Preanesthesia Checklist:  Patient identified, IV assessed, risks and benefits discussed, monitors and equipment assessed, procedure being performed at surgeon's request and anesthesia consent obtained.    BYRON Probe Insertion    Probe Status PRE Insertion: NO obvious damage  Probe type:  Adult 3D  Bite block used:   Oral Airway  Insertion Technique: Jaw Lift  Insertion complications: None obvious  Billing Report:BYRON report by Anesthesiologist (See Separate Report note)  Probe Status POST Removal: NO obvious damage    BYRON Report  General Procedure Information  Images for this study have been archived.  Modalities: 2D, 3D, CW Doppler, Color flow mapping and PW Doppler  Diagnostic Indications comments: Assessment of valvular and systolic function..  Echocardiographic and Doppler Measurements  Right Ventricle:  Cavity size normal.    Hypertrophy not present.   Thrombus not present.    Global function normal.     Left Ventricle:  Cavity size normal.    Hypertrophy present.   Thrombus not present.   Global Function normal.   Ejection Fraction 60%.      Ventricular Regional Function:  1- Basal Anteroseptal:  normal  2- Basal Anterior:  normal  3- Basal Anterolateral:  normal  4- Basal Inferolateral:  normal  5- Basal Inferior:  normal  6- Basal Inferoseptal:  normal  7- Mid Anteroseptal:  normal  8- Mid Anterior:  normal  9- Mid Anterolateral:  normal  10- Mid Inferolateral:  normal  11- Mid Inferior:  normal  12- Mid Inferoseptal:  normal  13- Apical Anterior:  normal  14- Apical Lateral:  normal  15- Apical Inferior:  normal  16- Apical Septal:  normal  17- [place]:  normal    Valves  Aortic Valve: Annulus bioprosthetic.  Stenosis moderate.  Regurgitation absent.  Leaflets thickened.  Leaflet motions restricted.    Mitral Valve: Annulus normal.  Stenosis not present.   Regurgitation absent.  Leaflets normal.  Leaflet motions normal.    Tricuspid Valve: Annulus normal.  Stenosis not present.  Regurgitation absent.  Leaflets normal.  Leaflet motions normal.    Pulmonic Valve: Annulus normal.  Stenosis not present.  Regurgitation +1.      Aorta: Ascending Aorta: Size normal.  Dissection not present.  Plaque thickness less than 3 mm.  Mobile plaque not present.    Aortic Arch: Size normal.    Dissection not present.   Plaque thickness less than 3 mm.   Mobile plaque not present.    Descending Aorta: Size normal.    Dissection not present.   Plaque thickness less than 3 mm.   Mobile plaque not present.      Right Atrium:  Size normal.   Spontaneous echo contrast not present.   Thrombus not present.   Tumor not present.   Device not present.     Left Atrium: Size normal.  Spontaneous echo contrast not present.  Thrombus not present.  Tumor not present.  Device not present.    Left atrial appendage normal.     Atrial Septum: Intra-atrial septal morphology normal.       Ventricular Septum: Intra-ventricular septum morphology normal.      Diastolic Function Measurements:  Diastolic Dysfunction Grade= II.  E=  62.5 ms.  A=  29.8 ms.  E/A Ratio=  2.1.  DT=  233 ms.  S/D=  0.5.  IVRT= ms.  Other Findings:   Pericardium:  normal. Pleural Effusion:  none. Pulmonary Arteries:  normal. Pulmonary Venous Flow:  blunted (decreased) systolic flow. Cornoary sinus catheter present. Coronary sinus size (mm):  5.   Post Intervention Findings  Procedure(s) performed:  Aortic Valve Repair/Replace.  Regional wall motion:. Surgeon(s) notified of all postintervention findings: Yes (In real time).   Aortic Valve: Valve replaced with mechanical valve.               Echocardiogram Comments  Echocardiogram comments:   PRE-PROCEDURE:  Intraoperative BYRON -- 30 year old male undergoing redo AVR, root enlargement:  At time of assessment, patient was under general anesthesia.   Rhythm: SB / NSR  LV: Normal chamber  size, concentric LVH. Preserved systolic function, LVEF 55-60% by qualitative assessment. No RWMA.  RV: Normal chamber size. Preserved systolic function.  Aorta: No aneurysm, no dissection, no mobile plaque.  AV: Preexisting bioprosthetic valve. Appears well seated and without PVL. Leaflets difficult to visualize. Severe prosthetic valve stenosis (MG 27 mmHg; PG Vmax 3.63 m/s; VR 0.19).   MV: Normal leaflet morphology with normal coaptation and motion. No regurgitation or stenosis. Blunted systolic pulmonary vein flow.  PV: Limited visualization. Trace PI.  TV: Normal leaflet morphology with normal coaptation and motion. No regurgitation or stenosis. Normal hepatic vein flow.  Pleura: No pleural effusion.  IAS: No evidence of PFO by color flow doppler.  SILVANA without evidence of thrombus by color flow doppler.  All findings communicated to surgical team.       POST-PROCEDURE:  S/p redo AVR with 19 mm [place] mechanical valve + aortic root enlargement with bovine pericardial patch.  Rhythm: NSR  Preserved biventricular function, unchanged. LV EF 55-60%  AV: S/p AVR with 19 mm mechanical valve. Appears well seated, opens/closes well, no evidence of paravalvular leak. MG 8 mmHg.  Valves: No other interval valvulopathies.  Aorta: No interval changes. No evidence of aortic dissection s/p decannulation.  Pericardium: No interval pericardial effusion.   Pleura: No interval pleural effusion.  The remainder of the post-intervention echocardiographic exam was unchanged from baseline. All findings discussed with surgical team.

## 2025-07-17 ENCOUNTER — APPOINTMENT (OUTPATIENT)
Dept: GENERAL RADIOLOGY | Facility: CLINIC | Age: 30
End: 2025-07-17
Payer: COMMERCIAL

## 2025-07-17 ENCOUNTER — APPOINTMENT (OUTPATIENT)
Dept: OCCUPATIONAL THERAPY | Facility: CLINIC | Age: 30
End: 2025-07-17
Attending: SURGERY
Payer: COMMERCIAL

## 2025-07-17 VITALS
BODY MASS INDEX: 23.19 KG/M2 | RESPIRATION RATE: 19 BRPM | OXYGEN SATURATION: 100 % | WEIGHT: 153 LBS | TEMPERATURE: 99.1 F | SYSTOLIC BLOOD PRESSURE: 97 MMHG | HEIGHT: 68 IN | DIASTOLIC BLOOD PRESSURE: 61 MMHG | HEART RATE: 82 BPM

## 2025-07-17 DIAGNOSIS — I35.9 AORTIC VALVE DISORDER: Primary | ICD-10-CM

## 2025-07-17 LAB
ALBUMIN SERPL BCG-MCNC: 3.4 G/DL (ref 3.5–5.2)
ALLEN'S TEST: ABNORMAL
ALP SERPL-CCNC: 38 U/L (ref 40–150)
ALT SERPL W P-5'-P-CCNC: 9 U/L (ref 0–70)
ANION GAP SERPL CALCULATED.3IONS-SCNC: 9 MMOL/L (ref 7–15)
AST SERPL W P-5'-P-CCNC: 48 U/L (ref 0–45)
BASE EXCESS BLDA CALC-SCNC: -0.9 MMOL/L (ref -3–3)
BASE EXCESS BLDA CALC-SCNC: -1.4 MMOL/L (ref -3–3)
BASE EXCESS BLDA CALC-SCNC: 0.2 MMOL/L (ref -3–3)
BASE EXCESS BLDA CALC-SCNC: 0.5 MMOL/L (ref -3–3)
BASOPHILS # BLD AUTO: 0 10E3/UL (ref 0–0.2)
BASOPHILS NFR BLD AUTO: 0 %
BILIRUB SERPL-MCNC: 1.3 MG/DL
BUN SERPL-MCNC: 10.4 MG/DL (ref 6–20)
CA-I BLD-MCNC: 4.6 MG/DL (ref 4.4–5.2)
CALCIUM SERPL-MCNC: 7.8 MG/DL (ref 8.8–10.4)
CHLORIDE SERPL-SCNC: 111 MMOL/L (ref 98–107)
CREAT SERPL-MCNC: 0.89 MG/DL (ref 0.67–1.17)
EGFRCR SERPLBLD CKD-EPI 2021: >90 ML/MIN/1.73M2
EOSINOPHIL # BLD AUTO: 0 10E3/UL (ref 0–0.7)
EOSINOPHIL NFR BLD AUTO: 1 %
ERYTHROCYTE [DISTWIDTH] IN BLOOD BY AUTOMATED COUNT: 13.4 % (ref 10–15)
ERYTHROCYTE [DISTWIDTH] IN BLOOD BY AUTOMATED COUNT: 13.4 % (ref 10–15)
GLUCOSE BLDC GLUCOMTR-MCNC: 114 MG/DL (ref 70–99)
GLUCOSE BLDC GLUCOMTR-MCNC: 123 MG/DL (ref 70–99)
GLUCOSE BLDC GLUCOMTR-MCNC: 129 MG/DL (ref 70–99)
GLUCOSE BLDC GLUCOMTR-MCNC: 164 MG/DL (ref 70–99)
GLUCOSE SERPL-MCNC: 126 MG/DL (ref 70–99)
HAPTOGLOB SERPL-MCNC: <10 MG/DL (ref 30–200)
HCO3 BLD-SCNC: 23 MMOL/L (ref 21–28)
HCO3 BLD-SCNC: 23 MMOL/L (ref 21–28)
HCO3 BLD-SCNC: 25 MMOL/L (ref 21–28)
HCO3 BLD-SCNC: 25 MMOL/L (ref 21–28)
HCO3 SERPL-SCNC: 23 MMOL/L (ref 22–29)
HCT VFR BLD AUTO: 24.7 % (ref 40–53)
HCT VFR BLD AUTO: 26.4 % (ref 40–53)
HGB BLD-MCNC: 8.5 G/DL (ref 13.3–17.7)
HGB BLD-MCNC: 8.8 G/DL (ref 13.3–17.7)
IMM GRANULOCYTES # BLD: 0 10E3/UL
IMM GRANULOCYTES NFR BLD: 1 %
INR PPP: 1.56 (ref 0.85–1.15)
LDH SERPL L TO P-CCNC: 310 U/L (ref 0–250)
LYMPHOCYTES # BLD AUTO: 1.3 10E3/UL (ref 0.8–5.3)
LYMPHOCYTES NFR BLD AUTO: 17 %
MAGNESIUM SERPL-MCNC: 2 MG/DL (ref 1.7–2.3)
MCH RBC QN AUTO: 29.4 PG (ref 26.5–33)
MCH RBC QN AUTO: 30 PG (ref 26.5–33)
MCHC RBC AUTO-ENTMCNC: 33.3 G/DL (ref 31.5–36.5)
MCHC RBC AUTO-ENTMCNC: 34.4 G/DL (ref 31.5–36.5)
MCV RBC AUTO: 87 FL (ref 78–100)
MCV RBC AUTO: 88 FL (ref 78–100)
MONOCYTES # BLD AUTO: 0.9 10E3/UL (ref 0–1.3)
MONOCYTES NFR BLD AUTO: 11 %
NEUTROPHILS # BLD AUTO: 5.8 10E3/UL (ref 1.6–8.3)
NEUTROPHILS NFR BLD AUTO: 72 %
NRBC # BLD AUTO: 0 10E3/UL
NRBC BLD AUTO-RTO: 0 /100
O2/TOTAL GAS SETTING VFR VENT: 0 %
O2/TOTAL GAS SETTING VFR VENT: 0 %
O2/TOTAL GAS SETTING VFR VENT: 40 %
O2/TOTAL GAS SETTING VFR VENT: 40 %
OXYHGB MFR BLDA: 98 % (ref 92–100)
OXYHGB MFR BLDA: 98 % (ref 92–100)
OXYHGB MFR BLDA: 99 % (ref 92–100)
OXYHGB MFR BLDA: 99 % (ref 92–100)
PCO2 BLD: 31 MM HG (ref 35–45)
PCO2 BLD: 32 MM HG (ref 35–45)
PCO2 BLD: 46 MM HG (ref 35–45)
PCO2 BLD: 47 MM HG (ref 35–45)
PEEP: 5 CM H2O
PEEP: 5 CM H2O
PH BLD: 7.33 [PH] (ref 7.35–7.45)
PH BLD: 7.34 [PH] (ref 7.35–7.45)
PH BLD: 7.48 [PH] (ref 7.35–7.45)
PH BLD: 7.49 [PH] (ref 7.35–7.45)
PHOSPHATE SERPL-MCNC: 4.1 MG/DL (ref 2.5–4.5)
PLATELET # BLD AUTO: 117 10E3/UL (ref 150–450)
PLATELET # BLD AUTO: 127 10E3/UL (ref 150–450)
PO2 BLD: 168 MM HG (ref 80–105)
PO2 BLD: 175 MM HG (ref 80–105)
PO2 BLD: 189 MM HG (ref 80–105)
PO2 BLD: 194 MM HG (ref 80–105)
POTASSIUM SERPL-SCNC: 3.9 MMOL/L (ref 3.4–5.3)
PROT SERPL-MCNC: 5.1 G/DL (ref 6.4–8.3)
PROTHROMBIN TIME: 18.5 SECONDS (ref 11.8–14.8)
RBC # BLD AUTO: 2.83 10E6/UL (ref 4.4–5.9)
RBC # BLD AUTO: 2.99 10E6/UL (ref 4.4–5.9)
RETICS # AUTO: 0.06 10E6/UL (ref 0.03–0.1)
RETICS/RBC NFR AUTO: 2 % (ref 0.5–2)
SAO2 % BLDA: 99.9 % (ref 96–97)
SAO2 % BLDA: >100 % (ref 96–97)
SODIUM SERPL-SCNC: 143 MMOL/L (ref 135–145)
WBC # BLD AUTO: 8 10E3/UL (ref 4–11)
WBC # BLD AUTO: 8.1 10E3/UL (ref 4–11)

## 2025-07-17 PROCEDURE — 250N000013 HC RX MED GY IP 250 OP 250 PS 637: Performed by: SURGERY

## 2025-07-17 PROCEDURE — 85610 PROTHROMBIN TIME: CPT

## 2025-07-17 PROCEDURE — 999N000157 HC STATISTIC RCP TIME EA 10 MIN

## 2025-07-17 PROCEDURE — 83010 ASSAY OF HAPTOGLOBIN QUANT: CPT

## 2025-07-17 PROCEDURE — 82805 BLOOD GASES W/O2 SATURATION: CPT | Performed by: SURGERY

## 2025-07-17 PROCEDURE — 94003 VENT MGMT INPAT SUBQ DAY: CPT

## 2025-07-17 PROCEDURE — 250N000013 HC RX MED GY IP 250 OP 250 PS 637: Performed by: PHYSICIAN ASSISTANT

## 2025-07-17 PROCEDURE — 999N000259 HC STATISTIC EXTUBATION

## 2025-07-17 PROCEDURE — 999N000156 HC STATISTIC RCP CONSULT EA 30 MIN

## 2025-07-17 PROCEDURE — 999N000253 HC STATISTIC WEANING TRIALS

## 2025-07-17 PROCEDURE — 250N000011 HC RX IP 250 OP 636: Performed by: SURGERY

## 2025-07-17 PROCEDURE — 250N000009 HC RX 250: Performed by: PHYSICIAN ASSISTANT

## 2025-07-17 PROCEDURE — 85060 BLOOD SMEAR INTERPRETATION: CPT | Performed by: PATHOLOGY

## 2025-07-17 PROCEDURE — 84155 ASSAY OF PROTEIN SERUM: CPT

## 2025-07-17 PROCEDURE — 71045 X-RAY EXAM CHEST 1 VIEW: CPT

## 2025-07-17 PROCEDURE — 97530 THERAPEUTIC ACTIVITIES: CPT | Mod: GO

## 2025-07-17 PROCEDURE — 258N000003 HC RX IP 258 OP 636

## 2025-07-17 PROCEDURE — 97165 OT EVAL LOW COMPLEX 30 MIN: CPT | Mod: GO

## 2025-07-17 PROCEDURE — 84100 ASSAY OF PHOSPHORUS: CPT | Performed by: SURGERY

## 2025-07-17 PROCEDURE — 85045 AUTOMATED RETICULOCYTE COUNT: CPT

## 2025-07-17 PROCEDURE — P9045 ALBUMIN (HUMAN), 5%, 250 ML: HCPCS | Mod: JZ | Performed by: PHYSICIAN ASSISTANT

## 2025-07-17 PROCEDURE — 71045 X-RAY EXAM CHEST 1 VIEW: CPT | Mod: 26 | Performed by: RADIOLOGY

## 2025-07-17 PROCEDURE — 99291 CRITICAL CARE FIRST HOUR: CPT | Mod: 24

## 2025-07-17 PROCEDURE — 85025 COMPLETE CBC W/AUTO DIFF WBC: CPT

## 2025-07-17 PROCEDURE — 200N000002 HC R&B ICU UMMC

## 2025-07-17 PROCEDURE — 250N000011 HC RX IP 250 OP 636: Mod: JZ | Performed by: PHYSICIAN ASSISTANT

## 2025-07-17 PROCEDURE — 85027 COMPLETE CBC AUTOMATED: CPT

## 2025-07-17 PROCEDURE — 83735 ASSAY OF MAGNESIUM: CPT | Performed by: SURGERY

## 2025-07-17 PROCEDURE — 83615 LACTATE (LD) (LDH) ENZYME: CPT

## 2025-07-17 PROCEDURE — 82330 ASSAY OF CALCIUM: CPT | Performed by: SURGERY

## 2025-07-17 RX ORDER — METHOCARBAMOL 750 MG/1
750 TABLET, FILM COATED ORAL 4 TIMES DAILY
Status: DISCONTINUED | OUTPATIENT
Start: 2025-07-17 | End: 2025-07-21

## 2025-07-17 RX ORDER — MAGNESIUM SULFATE HEPTAHYDRATE 40 MG/ML
2 INJECTION, SOLUTION INTRAVENOUS ONCE
Status: COMPLETED | OUTPATIENT
Start: 2025-07-17 | End: 2025-07-17

## 2025-07-17 RX ORDER — WARFARIN SODIUM 3 MG/1
3 TABLET ORAL
Status: COMPLETED | OUTPATIENT
Start: 2025-07-17 | End: 2025-07-17

## 2025-07-17 RX ADMIN — ASPIRIN 81 MG CHEWABLE TABLET 81 MG: 81 TABLET CHEWABLE at 07:46

## 2025-07-17 RX ADMIN — METHOCARBAMOL 750 MG: 750 TABLET ORAL at 07:46

## 2025-07-17 RX ADMIN — HEPARIN SODIUM 5000 UNITS: 5000 INJECTION, SOLUTION INTRAVENOUS; SUBCUTANEOUS at 19:57

## 2025-07-17 RX ADMIN — OXYCODONE HYDROCHLORIDE 5 MG: 5 TABLET ORAL at 03:03

## 2025-07-17 RX ADMIN — MAGNESIUM SULFATE HEPTAHYDRATE 2 G: 2 INJECTION, SOLUTION INTRAVENOUS at 05:25

## 2025-07-17 RX ADMIN — CEFAZOLIN 1 G: 1 INJECTION, POWDER, FOR SOLUTION INTRAMUSCULAR; INTRAVENOUS at 03:04

## 2025-07-17 RX ADMIN — WARFARIN SODIUM 3 MG: 3 TABLET ORAL at 17:38

## 2025-07-17 RX ADMIN — ONDANSETRON 4 MG: 2 INJECTION INTRAMUSCULAR; INTRAVENOUS at 00:40

## 2025-07-17 RX ADMIN — CEFAZOLIN 1 G: 1 INJECTION, POWDER, FOR SOLUTION INTRAMUSCULAR; INTRAVENOUS at 11:40

## 2025-07-17 RX ADMIN — METHOCARBAMOL 750 MG: 750 TABLET ORAL at 05:25

## 2025-07-17 RX ADMIN — HEPARIN SODIUM 5000 UNITS: 5000 INJECTION, SOLUTION INTRAVENOUS; SUBCUTANEOUS at 11:40

## 2025-07-17 RX ADMIN — POLYETHYLENE GLYCOL 3350 17 G: 17 POWDER, FOR SOLUTION ORAL at 07:46

## 2025-07-17 RX ADMIN — OXYCODONE HYDROCHLORIDE 5 MG: 5 TABLET ORAL at 22:27

## 2025-07-17 RX ADMIN — OXYCODONE HYDROCHLORIDE 5 MG: 5 TABLET ORAL at 17:49

## 2025-07-17 RX ADMIN — NOREPINEPHRINE BITARTRATE 0.03 MCG/KG/MIN: 0.06 INJECTION, SOLUTION INTRAVENOUS at 03:09

## 2025-07-17 RX ADMIN — METHOCARBAMOL 750 MG: 750 TABLET ORAL at 19:57

## 2025-07-17 RX ADMIN — ALBUMIN HUMAN 25 G: 0.05 INJECTION, SOLUTION INTRAVENOUS at 01:44

## 2025-07-17 RX ADMIN — SENNOSIDES AND DOCUSATE SODIUM 1 TABLET: 50; 8.6 TABLET ORAL at 19:57

## 2025-07-17 RX ADMIN — OXYCODONE HYDROCHLORIDE 5 MG: 5 TABLET ORAL at 10:22

## 2025-07-17 RX ADMIN — HYDROMORPHONE HYDROCHLORIDE 0.3 MG: 1 INJECTION, SOLUTION INTRAMUSCULAR; INTRAVENOUS; SUBCUTANEOUS at 04:52

## 2025-07-17 RX ADMIN — PANTOPRAZOLE SODIUM 40 MG: 40 TABLET, DELAYED RELEASE ORAL at 07:46

## 2025-07-17 RX ADMIN — VANCOMYCIN HYDROCHLORIDE 1000 MG: 1 INJECTION, SOLUTION INTRAVENOUS at 07:48

## 2025-07-17 RX ADMIN — METHOCARBAMOL 750 MG: 750 TABLET ORAL at 15:55

## 2025-07-17 RX ADMIN — ACETAMINOPHEN 975 MG: 325 TABLET ORAL at 07:46

## 2025-07-17 RX ADMIN — SENNOSIDES AND DOCUSATE SODIUM 1 TABLET: 50; 8.6 TABLET ORAL at 07:46

## 2025-07-17 RX ADMIN — SODIUM CHLORIDE, SODIUM LACTATE, POTASSIUM CHLORIDE, AND CALCIUM CHLORIDE 1000 ML: .6; .31; .03; .02 INJECTION, SOLUTION INTRAVENOUS at 10:25

## 2025-07-17 RX ADMIN — METHOCARBAMOL 750 MG: 750 TABLET ORAL at 11:40

## 2025-07-17 RX ADMIN — ACETAMINOPHEN 975 MG: 325 TABLET ORAL at 01:43

## 2025-07-17 ASSESSMENT — ACTIVITIES OF DAILY LIVING (ADL)
ADLS_ACUITY_SCORE: 44
ADLS_ACUITY_SCORE: 65
ADLS_ACUITY_SCORE: 44
ADLS_ACUITY_SCORE: 65
ADLS_ACUITY_SCORE: 44
ADLS_ACUITY_SCORE: 65
ADLS_ACUITY_SCORE: 44
ADLS_ACUITY_SCORE: 43
ADLS_ACUITY_SCORE: 44
ADLS_ACUITY_SCORE: 44
ADLS_ACUITY_SCORE: 48
ADLS_ACUITY_SCORE: 44
ADLS_ACUITY_SCORE: 44

## 2025-07-17 NOTE — ANESTHESIA PREPROCEDURE EVALUATION
Anesthesia Pre-Procedure Evaluation    Patient: Leonel Mack   MRN: [id number] : 1995          Procedure : Procedure(s):  Redo Median Sternotomy, Lysis of Adhesions, Cardiopulmonary Bypass, Aortic Root Enlargement with Hemashield size 5.1cm x 7.6cm, Redo Aortic Valve Replacement with [employer] Mechanical Heart Valve Size 19, Intraoperative Transesophageal Echocardiogram per Anesthesia         Past Medical History:   Diagnosis Date    Aortic valve stenosis, congenital     s/p aortic valve replacement in     Congenital subaortic stenosis of membranous type     History of GI bleed     Prediabetes     Status post aortic valve replacement       Past Surgical History:   Procedure Laterality Date    REPLACE VALVE AORTIC N/A 2013    Procedure: Median Sternotomy on pump oxygenator, Aortic Valve Replacement, Transesophegeal Echo Cardiogram by Dr. Bojorquez.;  Surgeon: Jerome Patel MD;  Location: [facility] OR      No Known Allergies   Social History     Tobacco Use    Smoking status: Never     Passive exposure: Never    Smokeless tobacco: Never   Substance Use Topics    Alcohol use: Never      Wt Readings from Last 1 Encounters:   25 65.8 kg (145 lb 1 oz)             Physical Exam    OUTSIDE LABS:  CBC:   Lab Results   Component Value Date    WBC 8.6 2025    WBC 4.4 2025    HGB 9.0 (L) 2025    HGB 9.9 (L) 2025    HCT 28.9 (L) 2025    HCT 41.0 2025     (L) 2025     (L) 2025     BMP:   Lab Results   Component Value Date     2025     2025    POTASSIUM 4.7 2025    POTASSIUM 4.4 2025    CHLORIDE 114 (H) 2025    CHLORIDE 106 2025    CO2 21 (L) 2025    CO2 23 2025    BUN 9.9 2025    BUN 10.4 2025    CR 0.75 2025    CR 0.85 2025     (H) 2025     (H) 2025     COAGS:   Lab Results   Component Value Date    PTT 36 2025     "INR 1.49 (H) 07/16/2025    FIBR 108 (L) 07/16/2025     POC: No results found for: \"BGM\", \"HCG\", \"HCGS\"  HEPATIC:   Lab Results   Component Value Date    ALBUMIN 2.8 (L) 07/16/2025    PROTTOTAL 4.7 (L) 07/16/2025    ALT 10 07/16/2025    AST 44 07/16/2025    ALKPHOS 44 07/16/2025    BILITOTAL 1.4 (H) 07/16/2025     OTHER:   Lab Results   Component Value Date    PH 7.42 07/16/2025    LACT 1.5 07/16/2025    A1C 5.8 (H) 07/03/2025    NURIA 9.1 07/16/2025    PHOS 2.1 (L) 07/16/2025    MAG 2.9 (H) 07/16/2025       Anesthesia Plan    ASA Status:  4               Consents            Postoperative Care         Comments:                   Monica Espinoza MD    I have reviewed the pertinent notes and labs in the chart from the past 30 days and (re)examined the patient.  Any updates or changes from those notes are reflected in this note.    Clinically Significant Risk Factors Present on Admission        # Hyperkalemia: Highest K = 5.8 mmol/L in last 2 days, will monitor as appropriate   # Hyperchloremia: Highest Cl = 114 mmol/L in last 2 days, will monitor as appropriate      # Hypocalcemia: Lowest iCa = 4 mg/dL in last 2 days, will monitor and replace as appropriate     # Hypoalbuminemia: Lowest albumin = 2.8 g/dL at 7/16/2025  4:15 PM, will monitor as appropriate  # Coagulation Defect: INR = 1.49 (Ref range: 0.85 - 1.15) and/or PTT = 36 Seconds (Ref range: 22 - 38 Seconds), will monitor for bleeding  # Thrombocytopenia: Lowest platelets = 120 in last 2 days, will monitor for bleeding     # Circulatory Shock: required vasopressors within past 24 hours       # Anemia: based on hgb <11                        "

## 2025-07-17 NOTE — PROGRESS NOTES
Admitted/transferred from: OR   Reason for admission/transfer: Redo AVR  2 RN skin assessment: completed by Myself and Phillip CACERES RN   Result of skin assessment and interventions/actions: None   Height, weight, drug calc weight: Done  Patient belongings (see Flowsheet)  MDRO education added to care planN/A  ?  No major shift events upon arrival. 1L LR given for Hypotension. Continue with POC. Notify primary team with changes.

## 2025-07-17 NOTE — PROGRESS NOTES
07/17/25 1443   Appointment Info   Signing Clinician's Name / Credentials (OT) Koki Carrasquillo OTR/L   Rehab Comments (OT) OT/CR only, sternal, MAP >65, SBP <130   Living Environment   People in Home spouse   Current Living Arrangements apartment   Home Accessibility no concerns   Living Environment Comments Pt reports living with spouse in an apartment, elevator access, no stairs to navigate.   Self-Care   Usual Activity Tolerance good   Current Activity Tolerance moderate   Equipment Currently Used at Home none   Fall history within last six months no   Activity/Exercise/Self-Care Comment Pt IND with ADL tasks at baseline, no AD for functional mobility.   Instrumental Activities of Daily Living (IADL)   IADL Comments Pt reports IADL IND at baseline, works as , drives self, spouse can assist prn.   General Information   Onset of Illness/Injury or Date of Surgery 07/16/25   Referring Physician Chevy Hickman MD   Patient/Family Therapy Goal Statement (OT) Return home and to PLOF   Additional Occupational Profile Info/Pertinent History of Current Problem Per EMR, Leonel DAX Mack is a 30 year old male with PMH congenital subaortic stenosis s/p bAVR (2013), GIB, and thrombocytopenia. Presented to [facility] on 7/16/25 bioprosthetic aortic valve replacement (2013) for congenital subaortic stenosis, GIB, and thrombocytopenia. Presents to [facility] for re-do sternotomy [place], aortic root enlargement repair and mechanical AVR with Dr. Malave on 7/16/25.   Existing Precautions/Restrictions fall;cardiac;lifting;sternal   Limitations/Impairments safety/cognitive   Left Upper Extremity (Weight-bearing Status) partial weight-bearing (PWB)   Right Upper Extremity (Weight-bearing Status) partial weight-bearing (PWB)   Left Lower Extremity (Weight-bearing Status) full weight-bearing (FWB)   Right Lower Extremity (Weight-bearing Status) full weight-bearing (FWB)   General Observations and Info Activity: Ambulate with Assistance    Cognitive Status Examination   Orientation Status orientation to person, place and time   Cognitive Status Comments Cognition appears intact, will monitor   Sensory   Sensory Quick Adds sensation intact   Pain Assessment   Patient Currently in Pain Yes, see Vital Sign flowsheet   Posture   Posture not impaired   Posture Comments seated at EOB   Range of Motion Comprehensive   General Range of Motion bilateral upper extremity ROM WFL   Strength Comprehensive (MMT)   Comment, General Manual Muscle Testing (MMT) Assessment B UE strength grossly deconditioned, formal MMT not assessed this date, will monitor   Muscle Tone Assessment   Muscle Tone Quick Adds No deficits were identified   Coordination   Upper Extremity Coordination No deficits were identified   Bed Mobility   Bed Mobility sit-supine   Sit-Supine [place] (Bed Mobility) minimum assist (75% patient effort);verbal cues   Comment (Bed Mobility) per clinical judgement   Transfers   Transfers sit-stand transfer;toilet transfer   Sit-Stand Transfer   Sit/Stand Transfer Comments CGA STS from EOB   Toilet Transfer   Type (Toilet Transfer) sit-stand   [place] Level (Toilet Transfer) contact guard   Toilet Transfer Comments per clinical judgement   Balance   Balance Assessment sitting static balance   Balance Comments SBA at EOB   Activities of Daily Living   BADL Assessment/Intervention upper body dressing;bathing;lower body dressing;toileting   Bathing Assessment/Intervention   [place] Level (Bathing) minimum assist (75% patient effort);verbal cues;set up   Comment, (Bathing) per clinical judgement   Upper Body Dressing Assessment/Training   [place] Level (Upper Body Dressing) supervision;set up   Comment, (Upper Body Dressing) per clinical judgement   Lower Body Dressing Assessment/Training   [place] Level (Lower Body Dressing) contact guard assist;minimum assist (75% patient effort)   Comment, (Lower Body Dressing) per clinical judgement    Toileting   [place] Level (Toileting) minimum assist (75% patient effort)   Comment, (Toileting) per clinical judgement   Clinical Impression   Criteria for Skilled Therapeutic Interventions Met (OT) Yes, treatment indicated   OT Diagnosis Decreased ADL IND/safety, functional mobility, overall activity tolerance, cardiovascular endurance   OT Problem List-Impairments impacting ADL problems related to;activity tolerance impaired;balance;cognition;mobility;range of motion (ROM);strength;pain;post-surgical precautions;postural control   Assessment of Occupational Performance 5 or more Performance Deficits   Identified Performance Deficits bathing, toileting, functional mobility, IADL tasks, cardiovascular endurance, work   Planned Therapy Interventions (OT) ADL retraining;IADL retraining;balance training;bed mobility training;cognition;ROM;strengthening;transfer training;progressive activity/exercise   Clinical Decision Making Complexity (OT) problem focused assessment/low complexity   Risk & Benefits of therapy have been explained evaluation/treatment results reviewed;care plan/treatment goals reviewed;risks/benefits reviewed;participants included;patient   Clinical Impression Comments Pt will benefit from continued skilled OT/CR during IP stay to progress IND/safety and return to PLOF.   OT Total Evaluation Time   OT Eval, Low Complexity Minutes (29006) 5   OT Goals   Therapy Frequency (OT) Daily   OT Predicted Duration/Target Date for Goal Attainment 08/15/25   OT Goals Upper Body Bathing;Lower Body Bathing;Toilet Transfer/Toileting;Cognition;Cardiac Phase 1   OT: Upper Body Bathing Modified independent;within precautions   OT: Lower Body Bathing Modified independent;with precautions   OT: Toilet Transfer/Toileting Modified independent;toilet transfer;within precautions   OT: Cognitive Patient/caregiver will verbalize understanding of cognitive assessment results/recommendations as needed for safe discharge  planning   OT: Understanding of cardiac education to maximize quality of life, condition management, and health outcomes Patient;Caregiver;Verbalize;Demonstrate   OT: Perform aerobic activity with stable cardiovascular response intermittent;5 minutes;10 minutes;ambulation;NuStep   OT: Functional/aerobic ambulation tolerance with stable cardiovascular response in order to return to home and community environment Modified independent;Within precautions;Greater than 300 feet   OT: Navigation of stairs simulating home set up with stable cardiovascular response in order to return to home and community environment Supervision/SBA;Greater than 10 stairs   Interventions   Interventions Quick Adds Cardiac Rehab   Cardiac Education   Education Provided Precautions   Education Packet Given to Patient No   All Patient Education Handouts Reviewed with Patient and/or Family No   OT Discharge Planning   OT Plan OT/CR: review precs, standing ADLs, functional mobility (assess any PT needs-orders not released, anticipate do not need), CR   OT Discharge Recommendation (DC Rec) home with assist;home with outpatient cardiac rehab   OT Rationale for DC Rec Pt progressing well POD#1. Currently completing functional transfers in room and short distance mobility with SBA-CGA, no AD this date. Anticipate with continued IP therapies, pt will be able to dc home with assist and OP CR to progress IND/safety and cardiovascular endurance.   OT Brief overview of current status CGA STS and short-distance ambulation in-room   OT Total Distance Amb During Session (feet) 15   Total Session Time   Total Session Time (sum of timed and untimed services) 5

## 2025-07-17 NOTE — PHARMACY-ANTICOAGULATION SERVICE
Clinical Pharmacy - Warfarin Dosing Consult     Pharmacy has been consulted to manage this patient s warfarin therapy.  Indication: Mechanical Aortic Valve Replacement  Therapy Goal: INR 2-3  Warfarin Prior to Admission: No  Significant drug interactions: aspirin/SQ heparin (increased bleeding risk)    INR   Date Value Ref Range Status   07/17/2025 1.56 (H) 0.85 - 1.15 Final   07/16/2025 1.49 (H) 0.85 - 1.15 Final       Recommend warfarin 3 mg today.  Pharmacy will monitor Leonel Mack daily and order warfarin doses to achieve specified goal.      Please contact pharmacy as soon as possible if the warfarin needs to be held for a procedure or if the warfarin goals change.

## 2025-07-17 NOTE — PROGRESS NOTES
CV ICU PROGRESS NOTE  07/17/2025  Leonel Mack  [id number]  Admitted: 7/16/2025  5:44 AM      ASSESSMENT: Leonel Mack is a 30 year old male with PMH congenital subaortic stenosis s/p bAVR (2013), GIB, and thrombocytopenia. Presented to CV ICU on 7/16/25 bioprosthetic aortic valve replacement (2013) for congenital subaortic stenosis, GIB, and thrombocytopenia. Presents to [facility] for re-do sternotomy IRVING, aortic root enlargement repair and mechanical AVR with Dr. Malave on 7/16/25.    Interval History:  Levophed needs ongoing; received IVF ~ 1.5 crystalloid and albumin overnight. Additional 1L LR given this AM. Low grade fever overnight likely 2/2 autonomic response from surgery; will stop tylenol and CTM fever/infectious markers.  Clinical exam unremarkable    CO-MORBIDITIES:   Acute post-operative pain  (primary encounter diagnosis)  Aortic valve disorder  Stenosis of prosthetic aortic valve, initial encounter    Changes/updates today:  - 1L LR  - Remove Santoro catheter  - Up to chair  - Started warfarin    PLAN:  Neuro/ pain/ sedation:  - Monitor neurological status. Notify the MD for any acute changes in exam.  #Acute postoperative pain  - PRN: oxycodone, Dilaudid    Pulmonary care:   # No acute concerns  - Goal SpO2 > 92%; currently RA  - Encourage IS q15-30 minutes when awake.  - Fast track extubation    Cardiovascular:    # Aortic stenosis of bio AVR s/p redo IRVING, aortic root enlargement repair and mechanical AVR on 7/16 w/ Dr. Malave  # Hx Congenital aortic stenosis s/p bio AVR in 2013  # Undifferentiated shock; likely vasoplegia  - Received 1.5L crystalloid and 500 mL Albumin overnight; additional 1L LR ordered this AM given ongoing need for levophed  - Goal MAP >65, SBP <140, monitor hemodynamics  - ASA ordered  - Warfarin started 7/17/25; goal INR 2-3    GI care / Nutrition:   # No acute concerns  - Clear liquid diet; ADAT  - PPI  - Bowel regimen: MiraLAX, senna    Renal / Fluids / Electrolytes:   # No  acute concerns  - Strict I/O, daily weights, avoid/limit nephrotoxins  - Replete lytes PRN per protocol  - Remove gallardo cath  - See Cards above for resuscitation efforts    Endocrine:    # Stress hyperglycemia  - No insulin management indicated  - Goal BG <180 for optimal healing  - Hypoglycemic protocols in place    ID / Antibiotics:  # Stress induced leukocytosis  - To complete perioperative regimen  - Monitor fever curve, WBC, and inflammatory markers as appropriate    Heme:     # Acute blood loss anemia  # Post CBP thrombocytopenia  # Chronic thrombocytopenia  No s/sx active bleeding  Appears to have chronic thromboytopenia; unclear etiology at this time. Will order hemolysis workup including blood smear. Likely nothing acute to do. Will require outpatient workup/evaluation  - Serial CBC   - Hgb goal > 7.0  - Hemolysis workup: LDH, haptoglobin, and blood smear      MSK / Skin:  #Sternotomy  #Surgical Incision  - Sternal precautions, postop incision management protocol  - PT/OT/CR    Prophylaxis:     - Mechanical DVT ppx  - Chemical DVT ppx: SQN  - PPI    Lines / Tubes / Drains:  - RIJ CVC  - PIV  - TPW  - CT: Mx2, and Lx1    Disposition:  - CVICU      Patient seen, findings and plan discussed with CVICU staff and cardiothoracic surgeons.  Time spent with patient 50  This does not include time spent on procedures or teaching.     SANDRA Greenfield CNP,   7/17/2025 at 12:46 PM      ====================================    TODAY'S PROGRESS      OBJECTIVE  1. VITAL SIGNS  Temp:  [92.1  F (33.4  C)-101.5  F (38.6  C)] 98.4  F (36.9  C)  Pulse:  [52-86] 76  Resp:  [0-35] 14  BP: (101)/(52) 101/52  MAP:  [59 mmHg-278 mmHg] 70 mmHg  Arterial Line BP: ()/() 102/56  FiO2 (%):  [40 %-50 %] 40 %  SpO2:  [96 %-100 %] 100 %  FiO2 (%): 40 %, Resp: 14, Vent Mode: VC/AC, Resp Rate (Set): 20 breaths/min, Tidal Volume (Set, mL): 410 mL, PEEP (cm H2O): 5 cmH2O, Pressure Support (cm H2O): 5 cmH2O, Resp Rate (Set): 20  breaths/min, Tidal Volume (Set, mL): 410 mL, PEEP (cm H2O): 5 cmH2O    2. INTAKE/ OUTPUT  I/O last 3 completed shifts:  In: 7472.56 [I.V.:4472.56; Other:255; IV Piggyback:1800]  Out: 6290 [Urine:4930; Blood:1000; Chest Tube:360]    3. PHYSICAL EXAMINATION    GEN: not in distress  EYES: PERRL, Anicteric sclera.   HEENT:  Normocephalic, atraumatic, trachea midline, Pupils PERRLA  CV: RRR, no gallops, rubs, or murmurs  PULM/CHEST: Clear breath sounds bilaterally without rhonchi, crackles or wheeze, symmetric chest rise  GI: normal bowel sounds, soft, non-tender, no rebound tenderness or guarding, no masses  : gallardo catheter in place, urine yellow and clear  EXTREMITIES: No peripheral edema, moving all extremities, peripheral pulses intact  NEURO: Cranial nerves II-XII grossly intact, no motor-sensory deficits noted  SKIN: No rashes, sores or ulcerations. Sternal incision dressing CDI  PSYCH:  Affect: appropriate          4. INVESTIGATIONS  Arterial Blood Gases   Recent Labs   Lab 07/17/25  0349 07/17/25  0154 07/17/25  0033 07/16/25  2357   PH 7.33* 7.34* 7.48* 7.49*   PCO2 47* 46* 32* 31*   PO2 168* 175* 194* 189*   HCO3 25 25 23 23     Complete Blood Count   Recent Labs   Lab 07/17/25  0348 07/16/25  2106 07/16/25  1615 07/16/25  1536   WBC 8.0  --  8.6  --    HGB 8.8* 9.0* 9.9* 10.3*   *  --  120*  --      Basic Metabolic Panel  Recent Labs   Lab 07/17/25  0840 07/17/25  0353 07/17/25  0348 07/17/25  0001 07/16/25  1620 07/16/25  1615 07/16/25  1536 07/16/25  1356   NA  --   --  143  --   --  143 142 139   POTASSIUM  --   --  3.9  --   --  4.7 4.4 4.5   CHLORIDE  --   --  111*  --   --  114*  --   --    CO2  --   --  23  --   --  21*  --   --    BUN  --   --  10.4  --   --  9.9  --   --    CR  --   --  0.89  --   --  0.75  --   --    * 123* 126* 129*   < > 142* 124* 129*    < > = values in this interval not displayed.     Liver Function Tests  Recent Labs   Lab 07/17/25  0836 07/17/25  0348  "07/16/25  1615 07/16/25  1355   AST  --  48* 44  --    ALT  --  9 10  --    ALKPHOS  --  38* 44  --    BILITOTAL  --  1.3* 1.4*  --    ALBUMIN  --  3.4* 2.8*  --    INR 1.56*  --  1.49* 2.58*     Pancreatic Enzymes  No lab results found in last 7 days.  Coagulation Profile  Recent Labs   Lab 07/17/25  0836 07/16/25  1615 07/16/25  1355   INR 1.56* 1.49* 2.58*   PTT  --  36 47*     Lactate  Invalid input(s): \"LACTATE\"    5. RADIOLOGY  Recent Results (from the past 24 hours)   XR Chest Port 1 View    Narrative    EXAM: XR CHEST PORT 1 VIEW 7/16/2025 4:44 PM    INDICATION: s/p aortic root enlargement with redo AVR    COMPARISON: 7/3/2025    TECHNIQUE: Single AP view of the chest.    FINDINGS:   Endotracheal tube in the mid thoracic trachea. Right IJ central venous  catheter at the superior SVC. 2 mediastinal drains and left basilar  chest tube. Aortic valve replacement. Intact sternotomy wires.  Epicardial pacer wire projects over the heart.    Mild streaky opacities in the right lung base. Trachea is midline.   Cardiac silhouette is normal in size.  No focal pulmonary  consolidation.  No pleural effusion or definite pneumothorax although  questionable deep sulcus sign on the right incompletely included.   Bones and soft tissues are unremarkable.      Impression    IMPRESSION:   1. Mild atelectasis in the right lung base. Questionable deep sulcus  sign on the right incompletely included. No apical pneumothorax  identified on the 60 degree upright radiograph. Attention on follow-up  chest radiograph recommended.  2. Support devices appear appropriately positioned.    I have personally reviewed the examination and initial interpretation  and I agree with the findings.    LOYDA PEREZ MD         SYSTEM ID:  [id number]   XR Chest Port 1 View    Narrative    Exam: XR CHEST PORT 1 VIEW, 7/17/2025 1:48 AM    Comparison: 7/16/2025    History: Post Op CVTS Surgery    Findings:  Portable AP view of the semiupright chest. " Right IJ CVC with tip in  the low SVC. Interval extubation. Mediastinal drains. Aortic valve  prostheses. Left chest tube. Trachea is midline. Cardiomediastinal  silhouette is within normal limits. No focal airspace opacity. No  pneumothorax or pleural effusion. The visualized upper abdomen is  unremarkable. No acute osseous abnormalities.      Impression    Impression:  1. Interval extubation. Stable position of support devices.  2. No focal airspace opacity.  I have personally reviewed the examination and initial interpretation  and I agree with the findings.    WILSON LEE MD         SYSTEM ID:  [id number]

## 2025-07-17 NOTE — PROGRESS NOTES
[facility], Procedure Note          Extubation:       Leonel Mack  MRN# [id number]   July 17, 2025, 1:18 AM         Patient extubated at: July 17, 2025, 00:50 AM   Supplemental Oxygen: Via NC at 4 liters per minute   Cough: The cough is moist   Secretion Mode: Able to clear   Secretion Amount: Scant amount, thin and clear in color   Respiratory Exam:: Breath sounds: crackles     Location: all lobes   Skin Exam:: Patient color: natural   Patient Status: Currently appears comfortable   Arterial Blood Gasses: pH Arterial   Date Value   07/17/2025 7.48 (H)   12/18/2013 7.39 pH     pO2 Arterial (mm Hg)   Date Value   07/17/2025 194 (H)   12/18/2013 76 (L)     pCO2 Arterial (mm Hg)   Date Value   07/17/2025 32 (L)   12/18/2013 42     Bicarbonate Arterial (mmol/L)   Date Value   07/17/2025 23   12/18/2013 25            No stridor noted at this time.    Recorded by Shaka Francisco, RT

## 2025-07-17 NOTE — ANESTHESIA POSTPROCEDURE EVALUATION
Patient: Leonel Mack    Procedure: Procedure(s):  Redo Median Sternotomy, Lysis of Adhesions, Cardiopulmonary Bypass, Aortic Root Enlargement with Hemashield size 5.1cm x 7.6cm, Redo Aortic Valve Replacement with [employer] Regents Mechanical Heart Valve Size 19, Intraoperative Transesophageal Echocardiogram per Anesthesia       Anesthesia Type:  No value filed.    Note:  Disposition: ICU            ICU Sign Out: Anesthesiologist/ICU physician sign out WAS performed   Postop Pain Control: Uneventful            Sign Out: Well controlled pain   PONV: No   Neuro/Psych: Uneventful            Sign Out: Acceptable/Baseline neuro status   Airway/Respiratory: Uneventful            Sign Out: AIRWAY IN SITU/Resp. Support   CV/Hemodynamics: Uneventful            Sign Out: Acceptable CV status; No obvious hypovolemia; No obvious fluid overload   Other NRE:    DID A NON-ROUTINE EVENT OCCUR?        Last vitals:  Vitals:    07/16/25 1900 07/16/25 2000 07/16/25 2100   BP:      Pulse: 64 63 65   Resp: 16 16    Temp: (!) 34.8  C (94.6  F) (!) 35.4  C (95.7  F) (!) 35.8  C (96.4  F)   SpO2: 100% 100% 100%       Electronically Signed By: Monica Espinoza MD  July 16, 2025  9:34 PM

## 2025-07-17 NOTE — PLAN OF CARE
Level of Care: Intensive Care  Updated by: Ellen RN @ 0600    Significant 24 hour events:  [facility] @ 0050    Neuro: A/O x4, JOINER, Tmax 38.6    Pulm: 2L NC, LS clear/dmn    CV: SR 60s-70s, Temp pacer BU @ 35     GI: Clear Liquid    : Santoro    Skin: MS, CT sites    Drips:  Levo @ 0.03

## 2025-07-17 NOTE — PROGRESS NOTES
CLINICAL NUTRITION SERVICES - BRIEF NOTE    Received provider consult for nutrition education with comments post op cardiovascular surgery (automatic consult on post-op order set). S/p re-do aortic valve replacement w mechanical valve and aortic root enlargement on 7/16/25. Nutrition education not indicated.    RD will follow per LOS protocol or if re-consulted.     Tamika Graham RD, LD, CNSC  Float Coverage  Available on [facility]

## 2025-07-18 ENCOUNTER — APPOINTMENT (OUTPATIENT)
Dept: OCCUPATIONAL THERAPY | Facility: CLINIC | Age: 30
End: 2025-07-18
Attending: SURGERY
Payer: COMMERCIAL

## 2025-07-18 ENCOUNTER — APPOINTMENT (OUTPATIENT)
Dept: GENERAL RADIOLOGY | Facility: CLINIC | Age: 30
End: 2025-07-18
Payer: COMMERCIAL

## 2025-07-18 LAB
ALBUMIN SERPL BCG-MCNC: 3.1 G/DL (ref 3.5–5.2)
ALP SERPL-CCNC: 39 U/L (ref 40–150)
ALT SERPL W P-5'-P-CCNC: 11 U/L (ref 0–70)
ANION GAP SERPL CALCULATED.3IONS-SCNC: 5 MMOL/L (ref 7–15)
AST SERPL W P-5'-P-CCNC: 46 U/L (ref 0–45)
ATRIAL RATE - MUSE: 54 BPM
ATRIAL RATE - MUSE: 73 BPM
BILIRUB SERPL-MCNC: 0.6 MG/DL
BUN SERPL-MCNC: 7.7 MG/DL (ref 6–20)
CA-I BLD-MCNC: 4.4 MG/DL (ref 4.4–5.2)
CALCIUM SERPL-MCNC: 7.6 MG/DL (ref 8.8–10.4)
CHLORIDE SERPL-SCNC: 107 MMOL/L (ref 98–107)
CREAT SERPL-MCNC: 0.78 MG/DL (ref 0.67–1.17)
DIASTOLIC BLOOD PRESSURE - MUSE: NORMAL MMHG
DIASTOLIC BLOOD PRESSURE - MUSE: NORMAL MMHG
EGFRCR SERPLBLD CKD-EPI 2021: >90 ML/MIN/1.73M2
ERYTHROCYTE [DISTWIDTH] IN BLOOD BY AUTOMATED COUNT: 13.5 % (ref 10–15)
GLUCOSE SERPL-MCNC: 141 MG/DL (ref 70–99)
HCO3 SERPL-SCNC: 26 MMOL/L (ref 22–29)
HCT VFR BLD AUTO: 24.5 % (ref 40–53)
HGB BLD-MCNC: 8.1 G/DL (ref 13.3–17.7)
INR PPP: 1.75 (ref 0.85–1.15)
INTERPRETATION ECG - MUSE: NORMAL
INTERPRETATION ECG - MUSE: NORMAL
MAGNESIUM SERPL-MCNC: 1.8 MG/DL (ref 1.7–2.3)
MCH RBC QN AUTO: 29.6 PG (ref 26.5–33)
MCHC RBC AUTO-ENTMCNC: 33.1 G/DL (ref 31.5–36.5)
MCV RBC AUTO: 89 FL (ref 78–100)
P AXIS - MUSE: 60 DEGREES
P AXIS - MUSE: 71 DEGREES
PATH REPORT.COMMENTS IMP SPEC: NORMAL
PATH REPORT.COMMENTS IMP SPEC: NORMAL
PATH REPORT.FINAL DX SPEC: NORMAL
PATH REPORT.MICROSCOPIC SPEC OTHER STN: NORMAL
PATH REPORT.MICROSCOPIC SPEC OTHER STN: NORMAL
PATH REPORT.RELEVANT HX SPEC: NORMAL
PHOSPHATE SERPL-MCNC: 1.6 MG/DL (ref 2.5–4.5)
PHOSPHATE SERPL-MCNC: 2.4 MG/DL (ref 2.5–4.5)
PLATELET # BLD AUTO: 88 10E3/UL (ref 150–450)
POTASSIUM SERPL-SCNC: 3.3 MMOL/L (ref 3.4–5.3)
POTASSIUM SERPL-SCNC: 4.4 MMOL/L (ref 3.4–5.3)
PR INTERVAL - MUSE: 144 MS
PR INTERVAL - MUSE: 160 MS
PROT SERPL-MCNC: 5.1 G/DL (ref 6.4–8.3)
PROTHROMBIN TIME: 20.7 SECONDS (ref 11.8–14.8)
QRS DURATION - MUSE: 88 MS
QRS DURATION - MUSE: 96 MS
QT - MUSE: 366 MS
QT - MUSE: 498 MS
QTC - MUSE: 403 MS
QTC - MUSE: 472 MS
R AXIS - MUSE: 31 DEGREES
R AXIS - MUSE: 42 DEGREES
RBC # BLD AUTO: 2.74 10E6/UL (ref 4.4–5.9)
SODIUM SERPL-SCNC: 138 MMOL/L (ref 135–145)
SYSTOLIC BLOOD PRESSURE - MUSE: NORMAL MMHG
SYSTOLIC BLOOD PRESSURE - MUSE: NORMAL MMHG
T AXIS - MUSE: -52 DEGREES
T AXIS - MUSE: -8 DEGREES
VENTRICULAR RATE- MUSE: 54 BPM
VENTRICULAR RATE- MUSE: 73 BPM
WBC # BLD AUTO: 6.9 10E3/UL (ref 4–11)

## 2025-07-18 PROCEDURE — 250N000013 HC RX MED GY IP 250 OP 250 PS 637: Performed by: SURGERY

## 2025-07-18 PROCEDURE — 94660 CPAP INITIATION&MGMT: CPT

## 2025-07-18 PROCEDURE — 94799 UNLISTED PULMONARY SVC/PX: CPT

## 2025-07-18 PROCEDURE — 999N000157 HC STATISTIC RCP TIME EA 10 MIN

## 2025-07-18 PROCEDURE — 84155 ASSAY OF PROTEIN SERUM: CPT

## 2025-07-18 PROCEDURE — 84132 ASSAY OF SERUM POTASSIUM: CPT | Performed by: SURGERY

## 2025-07-18 PROCEDURE — 250N000011 HC RX IP 250 OP 636: Performed by: SURGERY

## 2025-07-18 PROCEDURE — 85610 PROTHROMBIN TIME: CPT

## 2025-07-18 PROCEDURE — 97110 THERAPEUTIC EXERCISES: CPT | Mod: GO | Performed by: OCCUPATIONAL THERAPIST

## 2025-07-18 PROCEDURE — 85014 HEMATOCRIT: CPT

## 2025-07-18 PROCEDURE — 250N000013 HC RX MED GY IP 250 OP 250 PS 637: Performed by: PHYSICIAN ASSISTANT

## 2025-07-18 PROCEDURE — 71045 X-RAY EXAM CHEST 1 VIEW: CPT

## 2025-07-18 PROCEDURE — 97535 SELF CARE MNGMENT TRAINING: CPT | Mod: GO | Performed by: OCCUPATIONAL THERAPIST

## 2025-07-18 PROCEDURE — 82330 ASSAY OF CALCIUM: CPT | Performed by: SURGERY

## 2025-07-18 PROCEDURE — 99233 SBSQ HOSP IP/OBS HIGH 50: CPT | Mod: 24

## 2025-07-18 PROCEDURE — 84100 ASSAY OF PHOSPHORUS: CPT | Performed by: SURGERY

## 2025-07-18 PROCEDURE — 83735 ASSAY OF MAGNESIUM: CPT | Performed by: SURGERY

## 2025-07-18 PROCEDURE — 71045 X-RAY EXAM CHEST 1 VIEW: CPT | Mod: 26 | Performed by: STUDENT IN AN ORGANIZED HEALTH CARE EDUCATION/TRAINING PROGRAM

## 2025-07-18 PROCEDURE — 120N000005 HC R&B MS OVERFLOW UMMC

## 2025-07-18 PROCEDURE — 36415 COLL VENOUS BLD VENIPUNCTURE: CPT | Performed by: SURGERY

## 2025-07-18 RX ORDER — WARFARIN SODIUM 3 MG/1
3 TABLET ORAL
Status: COMPLETED | OUTPATIENT
Start: 2025-07-18 | End: 2025-07-18

## 2025-07-18 RX ORDER — POTASSIUM CHLORIDE 750 MG/1
40 TABLET, EXTENDED RELEASE ORAL ONCE
Status: COMPLETED | OUTPATIENT
Start: 2025-07-18 | End: 2025-07-18

## 2025-07-18 RX ORDER — MAGNESIUM OXIDE 400 MG/1
400 TABLET ORAL EVERY 4 HOURS
Status: COMPLETED | OUTPATIENT
Start: 2025-07-18 | End: 2025-07-18

## 2025-07-18 RX ADMIN — OXYCODONE HYDROCHLORIDE 5 MG: 5 TABLET ORAL at 07:17

## 2025-07-18 RX ADMIN — POTASSIUM & SODIUM PHOSPHATES POWDER PACK 280-160-250 MG 1 PACKET: 280-160-250 PACK at 23:42

## 2025-07-18 RX ADMIN — HEPARIN SODIUM 5000 UNITS: 5000 INJECTION, SOLUTION INTRAVENOUS; SUBCUTANEOUS at 19:35

## 2025-07-18 RX ADMIN — POTASSIUM CHLORIDE 40 MEQ: 750 TABLET, EXTENDED RELEASE ORAL at 06:06

## 2025-07-18 RX ADMIN — METHOCARBAMOL 750 MG: 750 TABLET ORAL at 07:17

## 2025-07-18 RX ADMIN — POTASSIUM & SODIUM PHOSPHATES POWDER PACK 280-160-250 MG 2 PACKET: 280-160-250 PACK at 06:06

## 2025-07-18 RX ADMIN — POLYETHYLENE GLYCOL 3350 17 G: 17 POWDER, FOR SOLUTION ORAL at 07:17

## 2025-07-18 RX ADMIN — OXYCODONE HYDROCHLORIDE 5 MG: 5 TABLET ORAL at 14:01

## 2025-07-18 RX ADMIN — OXYCODONE HYDROCHLORIDE 5 MG: 5 TABLET ORAL at 22:46

## 2025-07-18 RX ADMIN — HEPARIN SODIUM 5000 UNITS: 5000 INJECTION, SOLUTION INTRAVENOUS; SUBCUTANEOUS at 03:22

## 2025-07-18 RX ADMIN — METHOCARBAMOL 750 MG: 750 TABLET ORAL at 19:35

## 2025-07-18 RX ADMIN — SENNOSIDES AND DOCUSATE SODIUM 1 TABLET: 50; 8.6 TABLET ORAL at 07:17

## 2025-07-18 RX ADMIN — METHOCARBAMOL 750 MG: 750 TABLET ORAL at 11:31

## 2025-07-18 RX ADMIN — OXYCODONE HYDROCHLORIDE 5 MG: 5 TABLET ORAL at 02:06

## 2025-07-18 RX ADMIN — MAGNESIUM OXIDE TAB 400 MG (241.3 MG ELEMENTAL MG) 400 MG: 400 (241.3 MG) TAB at 10:19

## 2025-07-18 RX ADMIN — MAGNESIUM OXIDE TAB 400 MG (241.3 MG ELEMENTAL MG) 400 MG: 400 (241.3 MG) TAB at 06:06

## 2025-07-18 RX ADMIN — ASPIRIN 81 MG CHEWABLE TABLET 81 MG: 81 TABLET CHEWABLE at 07:17

## 2025-07-18 RX ADMIN — HEPARIN SODIUM 5000 UNITS: 5000 INJECTION, SOLUTION INTRAVENOUS; SUBCUTANEOUS at 11:31

## 2025-07-18 RX ADMIN — WARFARIN SODIUM 3 MG: 3 TABLET ORAL at 17:05

## 2025-07-18 RX ADMIN — SENNOSIDES AND DOCUSATE SODIUM 1 TABLET: 50; 8.6 TABLET ORAL at 19:35

## 2025-07-18 RX ADMIN — PANTOPRAZOLE SODIUM 40 MG: 40 TABLET, DELAYED RELEASE ORAL at 07:17

## 2025-07-18 RX ADMIN — POTASSIUM & SODIUM PHOSPHATES POWDER PACK 280-160-250 MG 2 PACKET: 280-160-250 PACK at 13:42

## 2025-07-18 RX ADMIN — METHOCARBAMOL 750 MG: 750 TABLET ORAL at 15:07

## 2025-07-18 RX ADMIN — POTASSIUM & SODIUM PHOSPHATES POWDER PACK 280-160-250 MG 2 PACKET: 280-160-250 PACK at 10:19

## 2025-07-18 RX ADMIN — POTASSIUM & SODIUM PHOSPHATES POWDER PACK 280-160-250 MG 1 PACKET: 280-160-250 PACK at 20:18

## 2025-07-18 ASSESSMENT — ACTIVITIES OF DAILY LIVING (ADL)
ADLS_ACUITY_SCORE: 45
ADLS_ACUITY_SCORE: 45
ADLS_ACUITY_SCORE: 43
ADLS_ACUITY_SCORE: 42
ADLS_ACUITY_SCORE: 45
ADLS_ACUITY_SCORE: 43
ADLS_ACUITY_SCORE: 42
ADLS_ACUITY_SCORE: 45
ADLS_ACUITY_SCORE: 43
ADLS_ACUITY_SCORE: 43
ADLS_ACUITY_SCORE: 42
ADLS_ACUITY_SCORE: 43
ADLS_ACUITY_SCORE: 43

## 2025-07-18 NOTE — PROGRESS NOTES
CV ICU PROGRESS NOTE  07/18/2025  Leonel Mack  [id number]  Admitted: 7/16/2025  5:44 AM      ASSESSMENT: Leonel Mack is a 30 year old male with PMH congenital subaortic stenosis s/p bAVR (2013), GIB, and thrombocytopenia. Presents to [facility] for re-do sternotomy IRVING, aortic root enlargement repair and mechanical AVR with Dr. Malave on 7/16/25.    Interval History:  NAEON.     CO-MORBIDITIES:   Acute post-operative pain  (primary encounter diagnosis)  Aortic valve disorder  Stenosis of prosthetic aortic valve, initial encounter    Changes/updates today:  - Transfer to CVTS    PLAN:  Neuro/ pain/ sedation:  - Monitor neurological status. Notify the MD for any acute changes in exam.  #Acute postoperative pain  - PRN: oxycodone, Dilaudid    Pulmonary care:   # No acute concerns  - Goal SpO2 > 92%; currently RA  - Encourage IS q15-30 minutes when awake.  - Fast track extubation    Cardiovascular:    # Aortic stenosis of bio AVR s/p redo IRVING, aortic root enlargement repair and mechanical AVR on 7/16 w/ Dr. Malave  # Hx Congenital aortic stenosis s/p bio AVR in 2013  # Undifferentiated shock; likely vasoplegia  - Levophed off for ~ 24 hours  - Goal MAP >65, SBP <140, monitor hemodynamics  - ASA ordered  - Warfarin started 7/17/25; goal INR 2-3    GI care / Nutrition:   # No acute concerns  - Regular diet  - Bowel regimen: MiraLAX, senna    Renal / Fluids / Electrolytes:   # No acute concerns  - Strict I/O, daily weights, avoid/limit nephrotoxins  - Replete lytes PRN per protocol  - Remove gallardo cath      Endocrine:    # Stress hyperglycemia  - No insulin management indicated  - Goal BG <180 for optimal healing  - Hypoglycemic protocols in place    ID / Antibiotics:  # Stress induced leukocytosis  - To complete perioperative regimen  - Monitor fever curve, WBC, and inflammatory markers as appropriate    Heme:     # Acute blood loss anemia  # Post CBP thrombocytopenia  # Chronic thrombocytopenia  No s/sx active  bleeding  Appears to have chronic thromboytopenia; unclear etiology at this time. Will order hemolysis workup including blood smear. Likely nothing acute to do. Will require outpatient workup/evaluation  - Serial CBC   - Hgb goal > 7.0  - Hemolysis workup: LDH, haptoglobin, and blood smear      MSK / Skin:  #Sternotomy  #Surgical Incision  - Sternal precautions, postop incision management protocol  - PT/OT/CR    Prophylaxis:     - Mechanical DVT ppx  - Chemical DVT ppx: SQN  - PPI    Lines / Tubes / Drains:  - RIJ CVC  - PIV  - TPW capped 7/18  - CT: Mx2, and Lx1    Disposition:  - CVICU      Patient seen, findings and plan discussed with CVICU staff and cardiothoracic surgeons.  Time spent with patient 50  This does not include time spent on procedures or teaching.     SANDRA Greenfield CNP,   7/17/2025 at 12:46 PM      ====================================    TODAY'S PROGRESS      OBJECTIVE  1. VITAL SIGNS  Temp:  [98.4  F (36.9  C)-99.4  F (37.4  C)] 98.6  F (37  C)  Pulse:  [] 93  Resp:  [0-27] 22  BP: ()/(52-76) 103/67  MAP:  [37 mmHg-110 mmHg] 51 mmHg  Arterial Line BP: ()/(49-81) 78/69  SpO2:  [94 %-100 %] 100 %  FiO2 (%): 40 %, Resp: 22, Vent Mode: VC/AC, Resp Rate (Set): 20 breaths/min, Tidal Volume (Set, mL): 410 mL, PEEP (cm H2O): 5 cmH2O, Pressure Support (cm H2O): 5 cmH2O, Resp Rate (Set): 20 breaths/min, Tidal Volume (Set, mL): 410 mL, PEEP (cm H2O): 5 cmH2O    2. INTAKE/ OUTPUT  I/O last 3 completed shifts:  In: 1747.35 [P.O.:240; I.V.:1507.35]  Out: 2545 [Urine:1905; Chest Tube:640]    3. PHYSICAL EXAMINATION    GEN: not in distress  EYES: PERRL, Anicteric sclera.   HEENT:  Normocephalic, atraumatic, trachea midline, Pupils PERRLA  CV: RRR, no gallops, rubs, or murmurs  PULM/CHEST: Clear breath sounds bilaterally without rhonchi, crackles or wheeze, symmetric chest rise  GI: normal bowel sounds, soft, non-tender, no rebound tenderness or guarding, no masses  : Voids  "spontaneously  EXTREMITIES: No peripheral edema, moving all extremities, peripheral pulses intact  NEURO: Cranial nerves II-XII grossly intact, no motor-sensory deficits noted  SKIN: No rashes, sores or ulcerations. Sternal incision dressing CDI  PSYCH:  Affect: appropriate          4. INVESTIGATIONS  Arterial Blood Gases   Recent Labs   Lab 07/17/25  0349 07/17/25  0154 07/17/25  0033 07/16/25  2357   PH 7.33* 7.34* 7.48* 7.49*   PCO2 47* 46* 32* 31*   PO2 168* 175* 194* 189*   HCO3 25 25 23 23     Complete Blood Count   Recent Labs   Lab 07/18/25  0329 07/17/25  1312 07/17/25  0348 07/16/25  2106 07/16/25  1615   WBC 6.9 8.1 8.0  --  8.6   HGB 8.1* 8.5* 8.8* 9.0* 9.9*   PLT 88* 117* 127*  --  120*     Basic Metabolic Panel  Recent Labs   Lab 07/18/25  0329 07/17/25  1247 07/17/25  0840 07/17/25  0353 07/17/25  0348 07/16/25  1620 07/16/25  1615 07/16/25  1536   0000     --   --   --  143  --  143 142  --    POTASSIUM 3.3*  --   --   --  3.9  --  4.7 4.4  --    CHLORIDE 107  --   --   --  111*  --  114*  --   --    CO2 26  --   --   --  23  --  21*  --   --    BUN 7.7  --   --   --  10.4  --  9.9  --   --    CR 0.78  --   --   --  0.89  --  0.75  --   --    * 114* 164* 123* 126*   < > 142* 124*   < >    < > = values in this interval not displayed.     Liver Function Tests  Recent Labs   Lab 07/18/25  0329 07/17/25  0836 07/17/25  0348 07/16/25  1615 07/16/25  1355   AST 46*  --  48* 44  --    ALT 11  --  9 10  --    ALKPHOS 39*  --  38* 44  --    BILITOTAL 0.6  --  1.3* 1.4*  --    ALBUMIN 3.1*  --  3.4* 2.8*  --    INR 1.75* 1.56*  --  1.49* 2.58*     Pancreatic Enzymes  No lab results found in last 7 days.  Coagulation Profile  Recent Labs   Lab 07/18/25  0329 07/17/25  0836 07/16/25  1615 07/16/25  1355   INR 1.75* 1.56* 1.49* 2.58*   PTT  --   --  36 47*     Lactate  Invalid input(s): \"LACTATE\"    5. RADIOLOGY  Recent Results (from the past 24 hours)   XR Chest Port 1 View    Narrative    EXAM: XR " CHEST PORT 1 VIEW 7/16/2025 4:44 PM    INDICATION: s/p aortic root enlargement with redo AVR    COMPARISON: 7/3/2025    TECHNIQUE: Single AP view of the chest.    FINDINGS:   Endotracheal tube in the mid thoracic trachea. Right IJ central venous  catheter at the superior SVC. 2 mediastinal drains and left basilar  chest tube. Aortic valve replacement. Intact sternotomy wires.  Epicardial pacer wire projects over the heart.    Mild streaky opacities in the right lung base. Trachea is midline.   Cardiac silhouette is normal in size.  No focal pulmonary  consolidation.  No pleural effusion or definite pneumothorax although  questionable deep sulcus sign on the right incompletely included.   Bones and soft tissues are unremarkable.      Impression    IMPRESSION:   1. Mild atelectasis in the right lung base. Questionable deep sulcus  sign on the right incompletely included. No apical pneumothorax  identified on the 60 degree upright radiograph. Attention on follow-up  chest radiograph recommended.  2. Support devices appear appropriately positioned.    I have personally reviewed the examination and initial interpretation  and I agree with the findings.    LOYDA PEREZ MD         SYSTEM ID:  [id number]   XR Chest Port 1 View    Narrative    Exam: XR CHEST PORT 1 VIEW, 7/17/2025 1:48 AM    Comparison: 7/16/2025    History: Post Op CVTS Surgery    Findings:  Portable AP view of the semiupright chest. Right IJ CVC with tip in  the low SVC. Interval extubation. Mediastinal drains. Aortic valve  prostheses. Left chest tube. Trachea is midline. Cardiomediastinal  silhouette is within normal limits. No focal airspace opacity. No  pneumothorax or pleural effusion. The visualized upper abdomen is  unremarkable. No acute osseous abnormalities.      Impression    Impression:  1. Interval extubation. Stable position of support devices.  2. No focal airspace opacity.  I have personally reviewed the examination and initial  interpretation  and I agree with the findings.    WILSON LEE MD         SYSTEM ID:  [id number]

## 2025-07-18 NOTE — OP NOTE
DATE OF PROCEDURE:7/16/2025     PREOPERATIVE DIAGNOSIS:  1. Prosthetic aortic valve stenosis S/P AVR  2. Thrombocytopenia     POSTOPERATIVE DIAGNOSIS: Same     PROCEDURES:   1.Redo sternotomy and mediastinal lysis of adhesions  2. Redo aortic valve replacement with 19 mm [employer]'s valve with aortic root enlargement using Hemashield patch        NOTE: This surgery was complicated as there were severe adhesions in the pericardial space as well as the fact that the aorta was friable and patient needed a root enlargement (Modifier 22)     SURGEON:  Francisco Javier Malave MD     ASSISTANTS: MD Chevy Shirley MD        OPERATIVE INDICATIONS:  Leonel Mack is a 30 year old male with PMH congenital subaortic stenosis s/p bAVR (2013), GIB, and thrombocytopenia.   I discussed risks, benefits of surgery with the patient and family including risk of death, stroke, bleeding, wound failure, arrhythmias.  They understood and wanted to proceed with surgery.     OPERATIVE FINDINGS:  The patient's sternum had good quality . There were severe adhesions in the pericardial space. Patient coronary were of low height and close to the annulus.     DESCRIPTION OF PROCEDURE:  The patient was brought to the operating room in stable condition, put under general anesthesia. The patient's chest, abdomen, lower extremities were prepped and draped in usual manner. A redo median sternotomy was performed with an oscillating saw. The adhesions were carefully dissected to expose the aorta, right atrium and vena cava. Preparation for cardiopulmonary bypass included ACT-guided heparinization and the administration of Amicar. The aorta was cannulated with 18 F cannula with 3-0 Tevdek suture x2.  Venous cannula were placed in right atrium. An antegrade  and retrograde cardioplegia cannula was placed. Full cardiopulmonary bypass was initiated. The aortic pressure was reduced and the aorta was clamped. 1 liter of cold blood cardiolegia was given  via the antegrade and retrograde route. Septal temperature was monitored. Subsequent doses were given at no greater than 20 minute intervals. An oblique aortotomy was performed. The previously placed tissue valve was carefully explanted. As mentioned earlier, the aorta in the non coronary sinus portion was friable. We decided to do an aortic root enlargement by incising the aorta down to the commissure between the non coronary and left coronary sinus. We fashioned a Hemashield patch and sewed it with continuous 4 -0 prolene to this portion of the enlarged aortic root. 2-0 Tevdek mattress sutures were placed around the annulus including through the patch. These sutures were passed through the sewing ring of 19 mm [employer] valve which was seated without difficulty using the Cor Knot device. We might have been able to place a 21 valve, but because his coronary height was low and the fact that a 19 mm Regents valve was adequate size, we decided not to. The rest of the aortotomy was closed using the patch.    Warm dose of  hotshot cardioplegia was given and with high suction on the aortic root vent, the cross-clamp was released.  Organized rhythm resumed with pacing.  Rewarming and reperfusion allowed.  De-airing was confirmed by echocardiography.  Echo showed normal EF and normal functioning of the aortic valve with no leak. The patient gradually weaned off cardiopulmonary bypass with low dose epinephrine.    Protamine was given.  All cannulas removed.  Careful hemostasis was obtained.  Two anterior mediastinal and one right pleural chest tubes was placed.  Sternum was closed with surgical steel wires.  Fascia, subcutaneous tissue, skin of chest were closed in layers.      Patient was transferred to ICU in stable condition.   Note, I  was physically present for the entire operation and performed key portions of the operation.

## 2025-07-18 NOTE — PLAN OF CARE
Major Shift Events: Significant 24 hour events:    Level of Care: Intensive Care  Updated by: Lyn Mac RN on 7/18/2025 at 4:09 AM    Neuro: A/O x4, JOINER, pain controlled robaxin and PRN oxy.    Pulm: Room air - 2 L overnight. LS clear/dmn. Using IS.    CV: SR 70s-80s, Temp pacer set @ 35. Maps >65.    GI: Regular diet, BM PTA.    : Voiding with urinal. Intermittent hesitancy.    Skin: Midsternal, ChT sites x3.     Lines: R internal jugular MAC, L radial Art, PIV x2.    Drips:  NA    Labs:    Plan: Continue promoting patient safety, comfort, and wellbeing. Continue promoting ambulation as appropriate/tolerated. Continue encouraging deep breathing and IS use.    For vital signs and complete assessments, please see documentation flowsheets.        Goal Outcome Evaluation:      Plan of Care Reviewed With: patient    Overall Patient Progress: improvingOverall Patient Progress: improving

## 2025-07-18 NOTE — PROGRESS NOTES
Neuro: A/O x4, JOINER, pain controlled robaxin and PRN oxy. Up to chair multiple times during shift - walked with rehab.    Pulm: Room air. LS clear/dmn. Using IS. (2p,2m chest tubes)    CV: SR 70s-80s, Pacer wires capped. Maps >65.    GI: Regular diet, BM PTA - passing gas.    : Voiding with urinal.    Skin: Midsternal, CT sites x3.     Lines: PIV x2. Art line and internal jugular MAC removed.    Drips: NA    Labs: Electrolytes replaced    Intake/Output Summary (Last 24 hours) at 7/18/2025 1707  Last data filed at 7/18/2025 1700  Gross per 24 hour   Intake 352 ml   Output 3970 ml   Net -3618 ml     Documenting RN assumed care of this patient from Date: 7/18/25 Time: 7537-6214.  For vital signs, drip titrations, and complete assessments, please see documentation flowsheets; assessments charted by exception.  All ICU standards of care were followed.  Any critical lab values were called to MD within 5 minutes of writer receiving them from lab.     Jordan Duncan, RN, BSN

## 2025-07-19 ENCOUNTER — APPOINTMENT (OUTPATIENT)
Dept: GENERAL RADIOLOGY | Facility: CLINIC | Age: 30
End: 2025-07-19
Payer: COMMERCIAL

## 2025-07-19 PROBLEM — Z95.2 STATUS POST MECHANICAL AORTIC VALVE REPLACEMENT: Status: ACTIVE | Noted: 2025-07-19

## 2025-07-19 LAB
ALBUMIN SERPL BCG-MCNC: 3.3 G/DL (ref 3.5–5.2)
ALP SERPL-CCNC: 53 U/L (ref 40–150)
ALT SERPL W P-5'-P-CCNC: 12 U/L (ref 0–70)
ANION GAP SERPL CALCULATED.3IONS-SCNC: 6 MMOL/L (ref 7–15)
AST SERPL W P-5'-P-CCNC: 38 U/L (ref 0–45)
BILIRUB SERPL-MCNC: 0.5 MG/DL
BUN SERPL-MCNC: 6.5 MG/DL (ref 6–20)
CA-I BLD-MCNC: 4.4 MG/DL (ref 4.4–5.2)
CALCIUM SERPL-MCNC: 8.2 MG/DL (ref 8.8–10.4)
CHLORIDE SERPL-SCNC: 108 MMOL/L (ref 98–107)
CHOLEST SERPL-MCNC: 61 MG/DL
CREAT SERPL-MCNC: 0.72 MG/DL (ref 0.67–1.17)
EGFRCR SERPLBLD CKD-EPI 2021: >90 ML/MIN/1.73M2
ERYTHROCYTE [DISTWIDTH] IN BLOOD BY AUTOMATED COUNT: 13.6 % (ref 10–15)
GLUCOSE SERPL-MCNC: 138 MG/DL (ref 70–99)
HCO3 SERPL-SCNC: 23 MMOL/L (ref 22–29)
HCT VFR BLD AUTO: 25.7 % (ref 40–53)
HDLC SERPL-MCNC: 31 MG/DL
HGB BLD-MCNC: 8.6 G/DL (ref 13.3–17.7)
INR PPP: 2.42 (ref 0.85–1.15)
LDLC SERPL CALC-MCNC: 16 MG/DL
MAGNESIUM SERPL-MCNC: 1.9 MG/DL (ref 1.7–2.3)
MCH RBC QN AUTO: 29.7 PG (ref 26.5–33)
MCHC RBC AUTO-ENTMCNC: 33.5 G/DL (ref 31.5–36.5)
MCV RBC AUTO: 89 FL (ref 78–100)
NONHDLC SERPL-MCNC: 30 MG/DL
PHOSPHATE SERPL-MCNC: 2.4 MG/DL (ref 2.5–4.5)
PLATELET # BLD AUTO: 100 10E3/UL (ref 150–450)
POTASSIUM SERPL-SCNC: 3.9 MMOL/L (ref 3.4–5.3)
PROT SERPL-MCNC: 5.7 G/DL (ref 6.4–8.3)
PROTHROMBIN TIME: 25.8 SECONDS (ref 11.8–14.8)
RBC # BLD AUTO: 2.9 10E6/UL (ref 4.4–5.9)
SODIUM SERPL-SCNC: 137 MMOL/L (ref 135–145)
TRIGL SERPL-MCNC: 70 MG/DL
WBC # BLD AUTO: 6.9 10E3/UL (ref 4–11)

## 2025-07-19 PROCEDURE — 250N000013 HC RX MED GY IP 250 OP 250 PS 637: Performed by: SURGERY

## 2025-07-19 PROCEDURE — 250N000011 HC RX IP 250 OP 636: Performed by: SURGERY

## 2025-07-19 PROCEDURE — 120N000005 HC R&B MS OVERFLOW UMMC

## 2025-07-19 PROCEDURE — 71045 X-RAY EXAM CHEST 1 VIEW: CPT | Mod: 26 | Performed by: STUDENT IN AN ORGANIZED HEALTH CARE EDUCATION/TRAINING PROGRAM

## 2025-07-19 PROCEDURE — 71045 X-RAY EXAM CHEST 1 VIEW: CPT | Mod: 26 | Performed by: RADIOLOGY

## 2025-07-19 PROCEDURE — 82465 ASSAY BLD/SERUM CHOLESTEROL: CPT

## 2025-07-19 PROCEDURE — 82330 ASSAY OF CALCIUM: CPT | Performed by: SURGERY

## 2025-07-19 PROCEDURE — 250N000013 HC RX MED GY IP 250 OP 250 PS 637

## 2025-07-19 PROCEDURE — 71045 X-RAY EXAM CHEST 1 VIEW: CPT

## 2025-07-19 PROCEDURE — 83735 ASSAY OF MAGNESIUM: CPT | Performed by: SURGERY

## 2025-07-19 PROCEDURE — 250N000013 HC RX MED GY IP 250 OP 250 PS 637: Performed by: PHYSICIAN ASSISTANT

## 2025-07-19 PROCEDURE — 85610 PROTHROMBIN TIME: CPT

## 2025-07-19 PROCEDURE — 80048 BASIC METABOLIC PNL TOTAL CA: CPT

## 2025-07-19 PROCEDURE — 84100 ASSAY OF PHOSPHORUS: CPT | Performed by: SURGERY

## 2025-07-19 PROCEDURE — 250N000009 HC RX 250: Performed by: SURGERY

## 2025-07-19 PROCEDURE — 85027 COMPLETE CBC AUTOMATED: CPT

## 2025-07-19 PROCEDURE — 258N000003 HC RX IP 258 OP 636: Performed by: SURGERY

## 2025-07-19 PROCEDURE — 36415 COLL VENOUS BLD VENIPUNCTURE: CPT | Performed by: SURGERY

## 2025-07-19 PROCEDURE — 71045 X-RAY EXAM CHEST 1 VIEW: CPT | Mod: 77

## 2025-07-19 RX ORDER — ACETAMINOPHEN 325 MG/1
975 TABLET ORAL EVERY 6 HOURS
Status: DISCONTINUED | OUTPATIENT
Start: 2025-07-19 | End: 2025-07-21

## 2025-07-19 RX ORDER — MAGNESIUM OXIDE 400 MG/1
400 TABLET ORAL EVERY 4 HOURS
Status: DISCONTINUED | OUTPATIENT
Start: 2025-07-19 | End: 2025-07-19

## 2025-07-19 RX ORDER — POLYETHYLENE GLYCOL 3350 17 G/17G
17 POWDER, FOR SOLUTION ORAL DAILY PRN
Status: DISCONTINUED | OUTPATIENT
Start: 2025-07-19 | End: 2025-07-23 | Stop reason: HOSPADM

## 2025-07-19 RX ORDER — SIMETHICONE 80 MG
80 TABLET,CHEWABLE ORAL 3 TIMES DAILY
Status: COMPLETED | OUTPATIENT
Start: 2025-07-19 | End: 2025-07-21

## 2025-07-19 RX ADMIN — ACETAMINOPHEN 975 MG: 325 TABLET ORAL at 12:15

## 2025-07-19 RX ADMIN — HEPARIN SODIUM 5000 UNITS: 5000 INJECTION, SOLUTION INTRAVENOUS; SUBCUTANEOUS at 03:39

## 2025-07-19 RX ADMIN — POTASSIUM & SODIUM PHOSPHATES POWDER PACK 280-160-250 MG 1 PACKET: 280-160-250 PACK at 03:39

## 2025-07-19 RX ADMIN — METHOCARBAMOL 750 MG: 750 TABLET ORAL at 12:15

## 2025-07-19 RX ADMIN — BISACODYL 10 MG: 10 SUPPOSITORY RECTAL at 00:02

## 2025-07-19 RX ADMIN — SIMETHICONE 80 MG: 80 TABLET, CHEWABLE ORAL at 13:50

## 2025-07-19 RX ADMIN — SIMETHICONE 80 MG: 80 TABLET, CHEWABLE ORAL at 19:56

## 2025-07-19 RX ADMIN — OXYCODONE HYDROCHLORIDE 5 MG: 5 TABLET ORAL at 09:19

## 2025-07-19 RX ADMIN — METHOCARBAMOL 750 MG: 750 TABLET ORAL at 08:51

## 2025-07-19 RX ADMIN — MAGNESIUM OXIDE TAB 400 MG (241.3 MG ELEMENTAL MG) 400 MG: 400 (241.3 MG) TAB at 08:51

## 2025-07-19 RX ADMIN — METHOCARBAMOL 750 MG: 750 TABLET ORAL at 16:00

## 2025-07-19 RX ADMIN — ASPIRIN 81 MG CHEWABLE TABLET 81 MG: 81 TABLET CHEWABLE at 08:51

## 2025-07-19 RX ADMIN — METHOCARBAMOL 750 MG: 750 TABLET ORAL at 19:56

## 2025-07-19 RX ADMIN — OXYCODONE HYDROCHLORIDE 5 MG: 5 TABLET ORAL at 13:50

## 2025-07-19 RX ADMIN — SENNOSIDES AND DOCUSATE SODIUM 1 TABLET: 50; 8.6 TABLET ORAL at 19:56

## 2025-07-19 RX ADMIN — WARFARIN SODIUM 0.5 MG: 1 TABLET ORAL at 18:19

## 2025-07-19 RX ADMIN — ACETAMINOPHEN 975 MG: 325 TABLET ORAL at 18:18

## 2025-07-19 RX ADMIN — POTASSIUM PHOSPHATE, MONOBASIC POTASSIUM PHOSPHATE, DIBASIC 9 MMOL: 224; 236 INJECTION, SOLUTION, CONCENTRATE INTRAVENOUS at 11:04

## 2025-07-19 RX ADMIN — Medication 12.5 MG: at 19:56

## 2025-07-19 RX ADMIN — OXYCODONE HYDROCHLORIDE 5 MG: 5 TABLET ORAL at 23:08

## 2025-07-19 RX ADMIN — ACETAMINOPHEN 975 MG: 325 TABLET ORAL at 23:08

## 2025-07-19 RX ADMIN — OXYCODONE HYDROCHLORIDE 5 MG: 5 TABLET ORAL at 18:23

## 2025-07-19 ASSESSMENT — ACTIVITIES OF DAILY LIVING (ADL)
ADLS_ACUITY_SCORE: 42

## 2025-07-19 NOTE — PLAN OF CARE
NURSING PROGRESS NOTE  Shift Summary  Date: July 19, 2025     Neuro/Musculoskeletal: A&Ox4.   Cardiac: SR/ST, HR 80s-100s. Goal SBP <140. VSS.     Respiratory: Sating in the 90s on RA.  GI/: Adequate urine output. LBM: 7/19/25.  Diet/Appetite: Tolerating regular diet.  Activity: SBA  Pain: midsternal incision, 4/10 pain.  Skin: midsternal incision, RIJ site-CDI and CT x3 to -20 suction.   LDAs + Drips/IVF: R&LPIV-SL.  Protocols/Labs: Mag, Phos and K+    Plan: Continue POC and report changes to the team.      Alpa Scott RN  [facility] ([facility]): [place]  Stepdown ICU (Unit 6C)

## 2025-07-19 NOTE — PROGRESS NOTES
Cardiovascular Surgery Progress Note  07/19/2025         Assessment and Plan:     Leonel Mack is a 30 year old male with PMH congenital subaortic stenosis s/p bAVR (2013), GIB, and thrombocytopenia. Presents to [facility] for re-do sternotomy [place], aortic root enlargement repair and mechanical AVR with Dr. Malave on 7/16/25.     Cardiovascular:   Aortic stenosis of bio AVR - s/p redo IRVING, aortic root enlargement repair and mechanical AVR on 7/16 w/ Dr. Malave  Hx Congenital aortic stenosis s/p bio AVR in 2013  Undifferentiated shock; likely vasoplegia, resolved  No arrhythmia, in NSR with HR 70s-104, HD stable.  Pre-op TTE: severely stenotic bio AVR, AV MG 50-55%  Post-op TTE ordered 7/19  - ASA 81 mg  - statin not indicated at the moment, lipid panel ordered  - metoprolol 12.5 mg BID ordered with hold parameters    Chest tubes: chest tubes x 3 removed 7/19 per surgeon request  TPW: CUT per surgeon due to therapeutic INR    Pulmonary:  - Extubated POD 1 to 4 lpm via NC. Now saturating well on RA  - Supplemental O2 PRN to keep sats > 92%. Wean off as tolerated.  - Pulm toilet, IS, activity and deep breathing  - 7/19 CXR with clear lung fields    Neurology /Psych:  Acute post-operative pain  - Acute post-operative pain well controlled with scheduled acetaminophen, scheduled methocarbamol, PO oxycodone PRN     / Renal:  - No hx of renal disease. Most recent creatinine 0.72, adequate UOP   - Pre-op weight 145 lbs, most recent weight 149 lbs  - 24 hour output: net -2.3L  - Diuresis not indicated, clinically euvolemic    GI / FEN:   Mild nausea  - Regular diet  - +BM 7/19, continue bowel regimen  - added simethicone TID on 7/19 for gas  - patient having mild intermittent nausea, he feels is related to taking pills  - Replace electrolytes as needed  - hepatic enzymes WNL    Endocrine:  Pre-diabetes  Stress-induced leukocytosis  Pre-op Hgb A1C 5.8  - Managed on insulin drip postop, transitioned to sliding scale goal BG <180  and has now also been discontinued due to good BG control with no insulin need.     Infectious Disease:  - WBC 6.9, remains afebrile, no signs or symptoms of infection  - Completed perioperative antibiotics    Hematology:   Acute blood loss anemia  Acute blood loss thrombocytopenia  Chronic thrombocytopenia  Hgb 8.6; Plt 100K, no signs or symptoms of active bleeding  - Daily CBC  - Recommend CBC recheck outpatient with PCP once recovered from acute post-op period and thrombocytopenia workup as indicated    Anticoagulation:   Chronic anticoagulation for mechanical AVR, INR goal 2-3  - Warfarin for mechanical [place] AVR, INR goal 2-3. Most recent INR 2.42.      MSK/Skin:  Sternotomy  - PT/OT  - Sternal precautions    Prophylaxis:   - DVT prophylaxis: anticoagulation , SCD    Disposition:   - Transferred to  on 7/18  - Therapies recommending discharge to home. Patient & spouse are concerned about discharge to home. Discussed 7/19 that patient may be ready for discharge as early as 7/20. Will ask therapies to reassess patient readiness for home.    Medically Ready for Discharge: Anticipated Tomorrow    Clinically Significant Risk Factors        # Hypokalemia: Lowest K = 3.3 mmol/L in last 2 days, will replace as needed   # Hyperchloremia: Highest Cl = 108 mmol/L in last 2 days, will monitor as appropriate          # Hypoalbuminemia: Lowest albumin = 2.8 g/dL at 7/16/2025  4:15 PM, will monitor as appropriate   # Thrombocytopenia: Lowest platelets = 88 in last 2 days, will monitor for bleeding                         Discussed with Dr. Malave through written communication.      Wendi Johnson PA-C  Cardiothoracic Surgery  Pager [phone]    8:55 AM July 19, 2025      Interval History:     No overnight events.  States pain is adequately managed on current regimen.   Tolerating diet. Notes nausea but feels it correlates to taking pills. Is passing frequent gas and belching. No emesis. +BM.  Breathing well without  "complaints.   Working with therapies and ambulating in halls with assistance. Felt lightheaded while walking yesterday.  Denies chest pain, palpitations, dizziness, syncopal symptoms, fevers, chills, myalgias, or sternal popping/clicking.         Physical Exam:   Blood pressure 100/66, pulse 95, temperature 99.1  F (37.3  C), temperature source Oral, resp. rate 19, height 1.727 m (5' 8\"), weight 67.9 kg (149 lb 9.6 oz), SpO2 100%.  Vitals:    07/16/25 0603 07/17/25 0600 07/19/25 0005   Weight: 65.8 kg (145 lb 1 oz) 69.4 kg (153 lb) 67.9 kg (149 lb 9.6 oz)      Gen: A&Ox4, NAD  Neuro: Intact with no focal deficits   CV: RRR, normal S1 S2, +mechanical valve click, no murmurs, rubs or gallops. no JVD  Pulm: CTA, no wheezing or rhonchi, unlabored breathing on RA  Abd: nondistended, normal BS, soft, nontender  Ext: no peripheral edema, no pitting  Skin:  Sternal incision: clean, dry, intact, no erythema, sternum stable  Tubes/drain sites: dressing clean and dry           Data:    Imaging:  reviewed recent imaging, no acute concerns  XR Chest Port 1 View  Narrative: Exam: XR CHEST PORT 1 VIEW, 7/18/2025 3:27 AM    Comparison: 7/17/2025    History: Post Op CVTS Surgery    Findings:  Portable AP view of the semiupright chest. Right IJ CVC with tip in  the low SVC. Mediastinal drains. Aortic and mitral valve prostheses.  Left chest tube. Trachea is midline. Cardiomediastinal silhouette is  within normal limits. Trace left apical pneumothorax. Increased right  basilar streaky opacities.. The visualized upper abdomen is  unremarkable. No acute osseous abnormalities.  Impression: Impression:   1. Trace left apical pneumothorax with left chest tube in place.  2. Increased right basilar streaky opacities likely representing  atelectasis.    I have personally reviewed the examination and initial interpretation  and I agree with the findings.    CHENG MUNIZ DO         SYSTEM ID:  [id number]        Labs:  BMP  Recent Labs   Lab " 07/19/25  0545 07/18/25  1331 07/18/25  0329 07/17/25  1247 07/17/25  0840 07/17/25  0353 07/17/25  0348 07/16/25  1620 07/16/25  1615     --  138  --   --   --  143  --  143   POTASSIUM 3.9 4.4 3.3*  --   --   --  3.9  --  4.7   CHLORIDE 108*  --  107  --   --   --  111*  --  114*   NURIA 8.2*  --  7.6*  --   --   --  7.8*  --  9.1   CO2 23  --  26  --   --   --  23  --  21*   BUN 6.5  --  7.7  --   --   --  10.4  --  9.9   CR 0.72  --  0.78  --   --   --  0.89  --  0.75   *  --  141* 114* 164*   < > 126*   < > 142*    < > = values in this interval not displayed.     CBC  Recent Labs   Lab 07/19/25  0545 07/18/25  0329 07/17/25  1312 07/17/25  0348   WBC 6.9 6.9 8.1 8.0   RBC 2.90* 2.74* 2.83* 2.99*   HGB 8.6* 8.1* 8.5* 8.8*   HCT 25.7* 24.5* 24.7* 26.4*   MCV 89 89 87 88   MCH 29.7 29.6 30.0 29.4   MCHC 33.5 33.1 34.4 33.3   RDW 13.6 13.5 13.4 13.4   * 88* 117* 127*     INR  Recent Labs   Lab 07/19/25  0545 07/18/25  0329 07/17/25  0836 07/16/25  1615   INR 2.42* 1.75* 1.56* 1.49*      Hepatic Panel  Recent Labs   Lab 07/19/25  0545 07/18/25  0329 07/17/25  0348 07/16/25  1615   AST 38 46* 48* 44   ALT 12 11 9 10   ALKPHOS 53 39* 38* 44   BILITOTAL 0.5 0.6 1.3* 1.4*   ALBUMIN 3.3* 3.1* 3.4* 2.8*     GLUCOSE:   Recent Labs   Lab 07/19/25  0545 07/18/25  0329 07/17/25  1247 07/17/25  0840 07/17/25  0353 07/17/25  0348   * 141* 114* 164* 123* 126*

## 2025-07-19 NOTE — PROGRESS NOTES
"BP 98/62 (BP Location: Left arm)   Pulse 94   Temp 98.6  F (37  C) (Oral)   Resp 21   Ht 1.727 m (5' 8\")   Wt 67.9 kg (149 lb 9.6 oz)   SpO2 100%   BMI 22.75 kg/m       Date: July 19, 2025 0457-6994     Neuro/Musculoskeletal: A&Ox4.   Cardiac: SR/ST, HR 80s-100s. Goal SBP <140. VSS.           Respiratory: Sating > 90% on RA.  GI/: Adequate urine output. LBM: 7/19/25.  Diet/Appetite: Tolerating regular diet.  Activity: SBA- up to bathroom and out for walks  Pain: midsternal incision, 4/10 pain. Oral oxycodone give x 2.  Skin: midsternal incision, RIJ site-CDI and CT x3 pulled today.  UTV sites, dressing remains clean, dry and intact.  LDAs + Drips/IVF: R&LPIV-SL.  Protocols/Labs: Mag, Phos and K+     Plan: Continue POC and report changes to the team.        "

## 2025-07-19 NOTE — PROGRESS NOTES
Transfer  Transferred from: 4A  Via:bed  Reason for transfer: Pt appropriate for 6C- improved patient condition  Family: Aware of transfer  Belongings: Received with pt  Chart: Received with pt  Medications: Meds received from old unit with pt  Code Status verified on armband: yes  2 RN Skin Assessment Completed By: Diana.  Suction/Ambu bag/Flowmeter at bedside: yes    Pt status:  Temp: 98.7F  HR: 89  BP: 105/66 MAP: 77  Resp: 18  Sp02: 100

## 2025-07-19 NOTE — PLAN OF CARE
Goal Outcome Evaluation:      Plan of Care Reviewed With: patient    Overall Patient Progress: improvingOverall Patient Progress: improving    Outcome Evaluation: Patient remained hemodynamically stable throughout the shift and then transferred to .    Neuro: A/O x4, pain rating 1-2 that is being managed with scheduled Robaxin, no change in sensation, walks with a standby assist of 1, afebrile    CV: NSR 80's with occasional PVC's, epicardial wires on standby with pacer at bedside, MAP > 65, SBP < 140    Pulm: RA, LS clear with diminished bases    GI: Regular diet, denies nausea, last BM PTA, passing flatus    : Voids spontaneously without difficulty    Skin: midsternal incision, CT sites x3    Drains: 1 L Pleural, 2 Mediastinal CT's to 1 atrium, output WDL    Drips: n/a    Lines: L PIV,  R PIV    Labs: Ph 2.4 (replaced)

## 2025-07-20 ENCOUNTER — APPOINTMENT (OUTPATIENT)
Dept: CARDIOLOGY | Facility: CLINIC | Age: 30
End: 2025-07-20
Payer: COMMERCIAL

## 2025-07-20 ENCOUNTER — APPOINTMENT (OUTPATIENT)
Dept: GENERAL RADIOLOGY | Facility: CLINIC | Age: 30
End: 2025-07-20
Payer: COMMERCIAL

## 2025-07-20 ENCOUNTER — APPOINTMENT (OUTPATIENT)
Dept: OCCUPATIONAL THERAPY | Facility: CLINIC | Age: 30
End: 2025-07-20
Attending: SURGERY
Payer: COMMERCIAL

## 2025-07-20 LAB
ANION GAP SERPL CALCULATED.3IONS-SCNC: 10 MMOL/L (ref 7–15)
ANION GAP SERPL CALCULATED.3IONS-SCNC: 8 MMOL/L (ref 7–15)
BUN SERPL-MCNC: 6.9 MG/DL (ref 6–20)
BUN SERPL-MCNC: 8.2 MG/DL (ref 6–20)
CALCIUM SERPL-MCNC: 8.3 MG/DL (ref 8.8–10.4)
CALCIUM SERPL-MCNC: 8.4 MG/DL (ref 8.8–10.4)
CHLORIDE SERPL-SCNC: 106 MMOL/L (ref 98–107)
CHLORIDE SERPL-SCNC: 110 MMOL/L (ref 98–107)
CREAT SERPL-MCNC: 0.67 MG/DL (ref 0.67–1.17)
CREAT SERPL-MCNC: 0.75 MG/DL (ref 0.67–1.17)
EGFRCR SERPLBLD CKD-EPI 2021: >90 ML/MIN/1.73M2
EGFRCR SERPLBLD CKD-EPI 2021: >90 ML/MIN/1.73M2
ERYTHROCYTE [DISTWIDTH] IN BLOOD BY AUTOMATED COUNT: 13.8 % (ref 10–15)
ERYTHROCYTE [DISTWIDTH] IN BLOOD BY AUTOMATED COUNT: 13.9 % (ref 10–15)
GLUCOSE SERPL-MCNC: 110 MG/DL (ref 70–99)
GLUCOSE SERPL-MCNC: 98 MG/DL (ref 70–99)
HCO3 SERPL-SCNC: 21 MMOL/L (ref 22–29)
HCO3 SERPL-SCNC: 22 MMOL/L (ref 22–29)
HCT VFR BLD AUTO: 25.9 % (ref 40–53)
HCT VFR BLD AUTO: 26.2 % (ref 40–53)
HGB BLD-MCNC: 8.6 G/DL (ref 13.3–17.7)
HGB BLD-MCNC: 8.7 G/DL (ref 13.3–17.7)
INR PPP: 2.18 (ref 0.85–1.15)
LACTATE SERPL-SCNC: 1 MMOL/L (ref 0.7–2)
MAGNESIUM SERPL-MCNC: 1.9 MG/DL (ref 1.7–2.3)
MCH RBC QN AUTO: 29.3 PG (ref 26.5–33)
MCH RBC QN AUTO: 29.7 PG (ref 26.5–33)
MCHC RBC AUTO-ENTMCNC: 32.8 G/DL (ref 31.5–36.5)
MCHC RBC AUTO-ENTMCNC: 33.6 G/DL (ref 31.5–36.5)
MCV RBC AUTO: 88 FL (ref 78–100)
MCV RBC AUTO: 89 FL (ref 78–100)
PHOSPHATE SERPL-MCNC: 3.7 MG/DL (ref 2.5–4.5)
PLATELET # BLD AUTO: 122 10E3/UL (ref 150–450)
PLATELET # BLD AUTO: 127 10E3/UL (ref 150–450)
POTASSIUM SERPL-SCNC: 3.8 MMOL/L (ref 3.4–5.3)
POTASSIUM SERPL-SCNC: 4.1 MMOL/L (ref 3.4–5.3)
PROTHROMBIN TIME: 23.9 SECONDS (ref 11.8–14.8)
RBC # BLD AUTO: 2.93 10E6/UL (ref 4.4–5.9)
RBC # BLD AUTO: 2.94 10E6/UL (ref 4.4–5.9)
SODIUM SERPL-SCNC: 137 MMOL/L (ref 135–145)
SODIUM SERPL-SCNC: 140 MMOL/L (ref 135–145)
WBC # BLD AUTO: 3.6 10E3/UL (ref 4–11)
WBC # BLD AUTO: 4.7 10E3/UL (ref 4–11)

## 2025-07-20 PROCEDURE — 74018 RADEX ABDOMEN 1 VIEW: CPT

## 2025-07-20 PROCEDURE — 83605 ASSAY OF LACTIC ACID: CPT

## 2025-07-20 PROCEDURE — 120N000005 HC R&B MS OVERFLOW UMMC

## 2025-07-20 PROCEDURE — 250N000013 HC RX MED GY IP 250 OP 250 PS 637

## 2025-07-20 PROCEDURE — 80048 BASIC METABOLIC PNL TOTAL CA: CPT

## 2025-07-20 PROCEDURE — 93308 TTE F-UP OR LMTD: CPT

## 2025-07-20 PROCEDURE — 85610 PROTHROMBIN TIME: CPT

## 2025-07-20 PROCEDURE — 258N000003 HC RX IP 258 OP 636

## 2025-07-20 PROCEDURE — 250N000011 HC RX IP 250 OP 636: Performed by: SURGERY

## 2025-07-20 PROCEDURE — 71046 X-RAY EXAM CHEST 2 VIEWS: CPT

## 2025-07-20 PROCEDURE — 93005 ELECTROCARDIOGRAM TRACING: CPT

## 2025-07-20 PROCEDURE — 93325 DOPPLER ECHO COLOR FLOW MAPG: CPT | Mod: 26 | Performed by: INTERNAL MEDICINE

## 2025-07-20 PROCEDURE — 83735 ASSAY OF MAGNESIUM: CPT | Performed by: SURGERY

## 2025-07-20 PROCEDURE — 74018 RADEX ABDOMEN 1 VIEW: CPT | Mod: 26 | Performed by: RADIOLOGY

## 2025-07-20 PROCEDURE — 250N000013 HC RX MED GY IP 250 OP 250 PS 637: Performed by: PHYSICIAN ASSISTANT

## 2025-07-20 PROCEDURE — 250N000013 HC RX MED GY IP 250 OP 250 PS 637: Performed by: SURGERY

## 2025-07-20 PROCEDURE — 85018 HEMOGLOBIN: CPT

## 2025-07-20 PROCEDURE — 36415 COLL VENOUS BLD VENIPUNCTURE: CPT

## 2025-07-20 PROCEDURE — 71046 X-RAY EXAM CHEST 2 VIEWS: CPT | Mod: 26 | Performed by: RADIOLOGY

## 2025-07-20 PROCEDURE — 93308 TTE F-UP OR LMTD: CPT | Mod: 26 | Performed by: INTERNAL MEDICINE

## 2025-07-20 PROCEDURE — 93321 DOPPLER ECHO F-UP/LMTD STD: CPT | Mod: 26 | Performed by: INTERNAL MEDICINE

## 2025-07-20 PROCEDURE — 93010 ELECTROCARDIOGRAM REPORT: CPT | Performed by: INTERNAL MEDICINE

## 2025-07-20 PROCEDURE — 85014 HEMATOCRIT: CPT

## 2025-07-20 PROCEDURE — 84100 ASSAY OF PHOSPHORUS: CPT | Performed by: SURGERY

## 2025-07-20 PROCEDURE — 97530 THERAPEUTIC ACTIVITIES: CPT | Mod: GO

## 2025-07-20 RX ORDER — MAGNESIUM OXIDE 400 MG/1
400 TABLET ORAL EVERY 4 HOURS
Status: COMPLETED | OUTPATIENT
Start: 2025-07-20 | End: 2025-07-20

## 2025-07-20 RX ORDER — CALCIUM CARBONATE 500 MG/1
1000 TABLET, CHEWABLE ORAL 4 TIMES DAILY PRN
Status: DISCONTINUED | OUTPATIENT
Start: 2025-07-20 | End: 2025-07-23 | Stop reason: HOSPADM

## 2025-07-20 RX ORDER — SIMETHICONE 80 MG
80 TABLET,CHEWABLE ORAL EVERY 6 HOURS PRN
Status: DISCONTINUED | OUTPATIENT
Start: 2025-07-20 | End: 2025-07-23 | Stop reason: HOSPADM

## 2025-07-20 RX ORDER — WARFARIN SODIUM 1 MG/1
1 TABLET ORAL
Status: COMPLETED | OUTPATIENT
Start: 2025-07-20 | End: 2025-07-20

## 2025-07-20 RX ADMIN — ONDANSETRON 4 MG: 4 TABLET, ORALLY DISINTEGRATING ORAL at 15:39

## 2025-07-20 RX ADMIN — Medication 12.5 MG: at 09:29

## 2025-07-20 RX ADMIN — PROCHLORPERAZINE EDISYLATE 10 MG: 5 INJECTION INTRAMUSCULAR; INTRAVENOUS at 09:40

## 2025-07-20 RX ADMIN — METHOCARBAMOL 750 MG: 750 TABLET ORAL at 09:27

## 2025-07-20 RX ADMIN — WARFARIN SODIUM 1 MG: 1 TABLET ORAL at 17:39

## 2025-07-20 RX ADMIN — SENNOSIDES AND DOCUSATE SODIUM 1 TABLET: 50; 8.6 TABLET ORAL at 09:27

## 2025-07-20 RX ADMIN — SODIUM CHLORIDE, SODIUM LACTATE, POTASSIUM CHLORIDE, AND CALCIUM CHLORIDE 500 ML: .6; .31; .03; .02 INJECTION, SOLUTION INTRAVENOUS at 10:30

## 2025-07-20 RX ADMIN — METHOCARBAMOL 750 MG: 750 TABLET ORAL at 15:40

## 2025-07-20 RX ADMIN — OXYCODONE HYDROCHLORIDE 5 MG: 5 TABLET ORAL at 09:27

## 2025-07-20 RX ADMIN — CALCIUM CARBONATE (ANTACID) CHEW TAB 500 MG 1000 MG: 500 CHEW TAB at 23:48

## 2025-07-20 RX ADMIN — ASPIRIN 81 MG CHEWABLE TABLET 81 MG: 81 TABLET CHEWABLE at 09:27

## 2025-07-20 RX ADMIN — SENNOSIDES AND DOCUSATE SODIUM 1 TABLET: 50; 8.6 TABLET ORAL at 19:35

## 2025-07-20 RX ADMIN — ACETAMINOPHEN 975 MG: 325 TABLET ORAL at 12:41

## 2025-07-20 RX ADMIN — METHOCARBAMOL 750 MG: 750 TABLET ORAL at 12:41

## 2025-07-20 RX ADMIN — SIMETHICONE 80 MG: 80 TABLET, CHEWABLE ORAL at 19:34

## 2025-07-20 RX ADMIN — SIMETHICONE 80 MG: 80 TABLET, CHEWABLE ORAL at 17:39

## 2025-07-20 RX ADMIN — MAGNESIUM OXIDE TAB 400 MG (241.3 MG ELEMENTAL MG) 400 MG: 400 (241.3 MG) TAB at 09:27

## 2025-07-20 RX ADMIN — SIMETHICONE 80 MG: 80 TABLET, CHEWABLE ORAL at 09:27

## 2025-07-20 RX ADMIN — ACETAMINOPHEN 975 MG: 325 TABLET ORAL at 05:03

## 2025-07-20 RX ADMIN — ONDANSETRON 4 MG: 2 INJECTION INTRAMUSCULAR; INTRAVENOUS at 08:21

## 2025-07-20 RX ADMIN — MAGNESIUM OXIDE TAB 400 MG (241.3 MG ELEMENTAL MG) 400 MG: 400 (241.3 MG) TAB at 12:44

## 2025-07-20 RX ADMIN — METHOCARBAMOL 750 MG: 750 TABLET ORAL at 19:34

## 2025-07-20 RX ADMIN — Medication 5 MG: at 19:57

## 2025-07-20 ASSESSMENT — ACTIVITIES OF DAILY LIVING (ADL)
ADLS_ACUITY_SCORE: 42
ADLS_ACUITY_SCORE: 40
ADLS_ACUITY_SCORE: 42
ADLS_ACUITY_SCORE: 40
ADLS_ACUITY_SCORE: 42
ADLS_ACUITY_SCORE: 40
ADLS_ACUITY_SCORE: 42

## 2025-07-20 NOTE — PLAN OF CARE
0305-1236    No acute events this shift. Intermittent nausea and increased fatigue with one episode of emesis after eating yogurt. EKG, CXR, labs, echo, AbXR, VS all unremarkable. 1L LR bolus given for suspected dehydration. Pt did not sleep last night - also a likely contributing factor. No Pain. PRN zofran x2, compazine x1. Pt up with SBA, up to walk x2 and up to bathroom x4. Minimal po intake today, encouraged throughout the day. VSS, NAD at end of shift. Family at bedside and helpful with cares.       Goal Outcome Evaluation:      Plan of Care Reviewed With: patient, spouse    Overall Patient Progress: no changeOverall Patient Progress: no change

## 2025-07-20 NOTE — DISCHARGE INSTRUCTIONS
AFTER YOU GO HOME FROM YOUR HEART SURGERY  (Redo sternotomy, redo mechanical aortic valve replacement, aortic root enlargement with Dr. Malave on 7/21/25)    You had a sternotomy, avoid lifting anything greater than ten pounds for 8 weeks after surgery and then less than 20 pounds for an additional 4 weeks.   Do not reach backwards or use arms to push out of chair.   Do not let people pull on your arms to assist with standing.   Avoid twisting or reaching too far across your body.    Avoid strenuous activities such as bowling, vacuuming, raking, shoveling, golf or tennis for 12 weeks after your surgery.   It is okay to resume sex if you feel comfortable in doing so. You may have to try different positions with your partner.    Splint your chest incision by hugging a pillow or bringing your arms across your chest when coughing or sneezing.     No driving for 4 weeks after surgery or while on pain medication.    Shower or wash your incisions daily with soap and water (or as instructed), pat dry.   Keep wound clean and dry, showers are okay after discharge, but don't let spray hit directly on incision.   No baths or swimming for 1 month.   Cover chest tube sites with dry gauze until they stop draining, then leave open to air. It is not abnormal for chest tube sites to drain yellowish/clear fluid for up to 2-3 weeks after surgery.   Watch for signs of infection: increased redness, tenderness, warmth or any drainage that appears infected (pus like) or is persistent.  Also a temperature > 100.5 F or chills. Call your surgeon or primary care provider's office immediately.   Remove any skin glue left on incisions after 10-14 days. This will not affect your incision and can speed up healing.    Exercise is very important in your recovery. Please follow the guidelines set up for you in your cardiac rehab classes at the hospital. If outpatient cardiac rehab was ordered for you, we highly recommend you participate. If you  "have problems arranging your cardiac rehab, please call [phone] for all locations, with the exception of [place], please call [phone] and [place], please call [phone].    Avoid sitting for prolonged periods of time, try to walk every hour during the day. If you have a leg incision, elevate your leg often when you are not walking.    Check your weight when you get home from the hospital and continue to check it daily through your recovery for at least a month. If you notice a weight gain of 2-3 pounds in a week, notify your primary care physician, cardiologist or surgeon.    Bowel activity may be slow after surgery. If necessary, you may take an over the counter laxative such as Milk of Magnesia or Miralax. You may have stool softeners prescribed (docusate sodium, Senokot). We recommend using stool softeners while using narcotics for pain (oxycodone/percocet, hydrocodone/vicodin, hydromorphone/dilaudid).      Wean OFF of narcotics (oxycodone, dilaudid, hydrocodone) as soon as possible. You should continue taking acetaminophen as long as you have any surgical pain as the first choice for pain control and add narcotics as necessary for pain to be tolerable.      DENTAL VISITS AFTER SURGERY  If you have had your heart valve repaired or replaced, we do not recommend having any dental work done for 6 months and you will need to take an antibiotic prior to dental visits from now on.  Please notify your dentist before any procedure for the proper treatment needed. The antibiotic is taken by mouth one hour prior to visit. This includes routine cleanings.  You can sometimes hear a mechanical valve \"clicking,\" this is normal and not a sign of something wrong.    DO NOT SMOKE.  IF YOU NEED HELP QUITTING, PLEASE TALK WITH YOUR CARDIOLOGIST OR PRIMARY DOCTOR.    You are on a blood thinner, follow the instructions you were given in the hospital and DO NOT SKIP this medication. Try and take it the same time " everyday. Your primary care physician or coumadin clinic will manage the dosing. INR goal is 2-3.  REGARDING PRESCRIPTION REFILLS.  If you need a refill on your pain medication contact us to discuss your pain and a possible one time refill.   All other medications will be adjusted, discontinued and re-filled by your primary care physician and/or your cardiologist as they were prior to your surgery. We have given you enough for one to three month with possibly one refill.    POST-OPERATIVE CLINIC VISITS  - You will have an INR check on  - You will have a follow up visit with CV[facility] Surgery [facility] in ~2 weeks from discharge.   - You should see your primary care provider in 2-4 weeks after discharge.   It is important to see your cardiologist about 4-6 weeks after discharge.    If you do not hear from a  in 7 days, please call [phone] (choose option 1) and request to be seen with a general cardiologist or someone that you have seen in the past.   If there is a need to return to see CT Surgery please call our  at [phone].    SURGICAL QUESTIONS  Please call our care coordinators (Lawrence Ash, Rosa Miller, Radha Cleary, China Metzger, or Wendi Ferris) with surgical recovery and medication questions, their phone numbers are listed below.  They will assist you with your needs and contact other surgery care team members as indicated.    On weekends or after hours, please call [phone] and ask the  to page the Cardiothoracic Surgery fellow on call.      Thank you,    Your Cardiothoracic Surgery Team   Rajwinder Hair RN Care Coordinator - [phone]  Rosa Miller RN Care Coordinator - [phone]   Radha Cleary, RN Care Coordinator - [phone]   China Metzger, ARTHUR Care Coordinator - [phone]  Wendi Ferris RN Care Coordinator - [phone]        Outpatient Primary Care and Anticoagulation Follow Up    [facility]  Phone: [phone]  Fax: [phone]    We have  scheduled you for post hospital follow up and INR lab draw on Thursday 7/24/2025 at 2pm with Soniya Keen. Please arrive at 1:40pm to check in.   _________________________________________________      To see if you qualify for in-home services contact resources below or inquire with your [employer] Insurance Plan if you have an assigned .     1) Senior Linkage Line  [address] [place] [address] [phone]  [email or web address]    2) [facility] [place]-choices Intake   Phone: [phone] and ask for the [facility] Intake Unit.

## 2025-07-20 NOTE — PLAN OF CARE
"NURSING PROGRESS NOTE  Shift Summary  Date: July 20, 2025     Neuro/Musculoskeletal: A&Ox4.   Cardiac: SR w/ BBB, HR 70s-90s. Goal SBP <140. VSS.           Respiratory: Sating in the 90s on RA.  GI/: Adequate urine output. LBM: 7/19/25.  Diet/Appetite: Tolerating regular diet.  Activity: SBA  Pain: midsternal incision, 3/10 pain.  Skin: no new deficits noted.  LDAs + Drips/IVF: R&LPIV-SL.  Protocols/Labs: Mag, Phos and K+     Plan: Continue POC and report changes to the team.      Alpa Scott, ARTHUR   [facility] ([facility]): [facility] ICU (Unit 6C)      Problem: Adult Inpatient Plan of Care  Goal: Plan of Care Review  Description: The Plan of Care Review/Shift note should be completed every shift.  The Outcome Evaluation is a brief statement about your assessment that the patient is improving, declining, or no change.  This information will be displayed automatically on your shift  note.  Outcome: Progressing  Flowsheets (Taken 7/20/2025 0011)  Plan of Care Reviewed With: patient  Overall Patient Progress: no change  Goal: Patient-Specific Goal (Individualized)  Description: You can add care plan individualizations to a care plan. Examples of Individualization might be:  \"Parent requests to be called daily at 9am for status\", \"I have a hard time hearing out of my right ear\", or \"Do not touch me to wake me up as it startles  me\".  Outcome: Progressing  Goal: Absence of Hospital-Acquired Illness or Injury  Outcome: Progressing  Intervention: Prevent Skin Injury  Recent Flowsheet Documentation  Taken 7/19/2025 2330 by Alpa Scott, RN  Body Position: position changed independently  Skin Protection: adhesive use limited  Taken 7/19/2025 2000 by Alpa Scott RN  Body Position: position changed independently  Skin Protection: adhesive use limited  Goal: Optimal Comfort and Wellbeing  Outcome: Progressing  Intervention: Monitor Pain and Promote " Called patient, lvm stating after cmc arthroplasty it does take a while for everything to go back to normal and for the pain to fully go away.  I told her for exercises a lot of just moving and using that hand and thumb, making fists and moving the digit to make sure stiffness doesn't set in to the joint.    Told to call with any other questions.    Comfort  Recent Flowsheet Documentation  Taken 7/19/2025 2308 by Alpa Scott, RN  Pain Management Interventions: medication (see MAR)  Intervention: Provide Person-Centered Care  Recent Flowsheet Documentation  Taken 7/19/2025 2330 by Alpa Scott RN  Trust Relationship/Rapport:   care explained   choices provided   emotional support provided   empathic listening provided   questions answered   questions encouraged   reassurance provided   thoughts/feelings acknowledged  Taken 7/19/2025 2000 by Alpa Scott RN  Trust Relationship/Rapport:   care explained   choices provided   emotional support provided   empathic listening provided   questions answered   questions encouraged   reassurance provided   thoughts/feelings acknowledged  Goal: Readiness for Transition of Care  Outcome: Progressing     Problem: Cardiovascular Surgery  Goal: Improved Activity Tolerance  Outcome: Progressing  Goal: Optimal Coping with Heart Surgery  Outcome: Progressing  Intervention: Support Psychosocial Response to Surgery  Recent Flowsheet Documentation  Taken 7/19/2025 2330 by Alpa Scott RN  Family/Support System Care:   presence promoted   self-care encouraged   support provided   caregiver stress acknowledged  Taken 7/19/2025 2000 by Alpa Scott RN  Family/Support System Care:   presence promoted   self-care encouraged   support provided   caregiver stress acknowledged  Goal: Absence of Bleeding  Outcome: Progressing  Goal: Effective Bowel Elimination  Outcome: Progressing  Goal: Effective Cardiac Function  Outcome: Progressing  Goal: Optimal Cerebral Tissue Perfusion  Outcome: Progressing  Intervention: Protect and Optimize Cerebral Perfusion  Recent Flowsheet Documentation  Taken 7/19/2025 2330 by Alpa Scott RN  Head of Bed (HOB) Positioning: HOB at 30-45 degrees  Taken 7/19/2025 2000 by Alpa Scott RN  Head of Bed (HOB) Positioning: HOB at 30-45  degrees  Goal: Fluid and Electrolyte Balance  Outcome: Progressing  Goal: Blood Glucose Level Within Targeted Range  Outcome: Progressing  Goal: Absence of Infection Signs and Symptoms  Outcome: Progressing  Goal: Anesthesia/Sedation Recovery  Outcome: Progressing  Goal: Acceptable Pain Control  Outcome: Progressing  Intervention: Prevent or Manage Pain  Recent Flowsheet Documentation  Taken 7/19/2025 2308 by Alpa Scott RN  Pain Management Interventions: medication (see MAR)  Goal: Nausea and Vomiting Relief  Outcome: Progressing  Goal: Effective Urinary Elimination  Outcome: Progressing  Goal: Effective Oxygenation and Ventilation  Outcome: Progressing  Intervention: Promote Airway Secretion Clearance  Recent Flowsheet Documentation  Taken 7/19/2025 2330 by Alpa Scott RN  Cough And Deep Breathing: done with encouragement  Taken 7/19/2025 2000 by Alpa Scott RN  Cough And Deep Breathing: done with encouragement     Problem: Cardiac Surgery Discharge Checklist  Goal: Medically stable  Description: No IV medication for pain  No IV medication for BP  No IV medication from blood surgery  No IV medication for diuresis  Outcome: Progressing  Goal: Euvolemic  Outcome: Progressing  Goal: Voiding  Description: Voiding or has returned to intermittent dialysis  Outcome: Progressing  Goal: Adequate Nutritional Intake  Description: Tolerating adequate nutritional intake  Outcome: Progressing  Goal: Room air or tested to qualify for home O2  Description: Home discharge, on room air or has had testing to qualify for home oxygen  Outcome: Progressing  Goal: Device training  Outcome: Progressing  Goal: Adequate anti-coagulation  Outcome: Progressing  Goal: Drains and chest tubes removed  Outcome: Progressing  Goal: Post-op imaging and echo completed  Description: (Not applicable to all cases)  Outcome: Progressing  Goal: Wound Vac  Description: Transitioned to ambulatory wound vac (not applicable  to all cases)  Outcome: Progressing   Goal Outcome Evaluation:      Plan of Care Reviewed With: patient    Overall Patient Progress: no change

## 2025-07-20 NOTE — PROGRESS NOTES
Cardiovascular Surgery Progress Note  07/20/2025         Assessment and Plan:     Leonel Mack is a 30 year old male with PMH congenital subaortic stenosis s/p bAVR (2013), GIB, and thrombocytopenia. Presents to [facility] for re-do sternotomy IRVING, aortic root enlargement repair and mechanical AVR with Dr. Malave on 7/16/25.     Cardiovascular:   Aortic stenosis of bio AVR - s/p redo IRVING, aortic root enlargement repair and mechanical AVR on 7/16 w/ Dr. Malave  Hx Congenital aortic stenosis s/p bio AVR in 2013  Undifferentiated shock; likely vasoplegia, resolved  Orthostatic hypotension  No arrhythmia, in NSR with HR 70s-90s, soft BP (SBP 90s), HD stable.  Pre-op TTE: severely stenotic bio AVR, AV MG 50-55%  Post-op TTE ordered 7/19  - ASA 81 mg  - statin not indicated  - metoprolol 12.5 mg BID ordered with hold parameters    Chest tubes: chest tubes x 3 removed 7/19 per surgeon request  TPW: CUT per surgeon due to therapeutic INR    Pulmonary:  - Extubated POD 1 to 4 lpm via NC. Now saturating well on RA  - Supplemental O2 PRN to keep sats > 92%. Wean off as tolerated.  - Pulm toilet, IS, activity and deep breathing  - 7/19 CXR with clear lung fields    Neurology /Psych:  Acute post-operative pain  - Acute post-operative pain well controlled with scheduled acetaminophen, scheduled methocarbamol, PO oxycodone PRN     / Renal:  Hypovolemia  - No hx of renal disease. Most recent creatinine 0.75, adequate UOP   - Pre-op weight 145 lbs, most recent weight 143 lbs  - 24 hour output: net -3.9L  - Suspect patient may feel overall poorly due to very poor oral nutritional and liquid intake. Gave 1L LR on 7/20 with improvement in symptoms. Encouraged oral intake especially of salty foods.    GI / FEN:   Mild nausea  - Regular diet  - +BM 7/19, continue bowel regimen  - added simethicone TID on 7/19 for gas  - patient having mild intermittent nausea, abdominal XR 7/20 non-obstructive  - Replace electrolytes as needed  - hepatic  enzymes WNL    Endocrine:  Pre-diabetes  Stress-induced leukocytosis  Pre-op Hgb A1C 5.8  - Managed on insulin drip postop, transitioned to sliding scale goal BG <180 and has now also been discontinued due to good BG control with no insulin need.     Infectious Disease:  - WBC 4.7, remains afebrile, no signs or symptoms of infection  - Completed perioperative antibiotics    Hematology:   Acute blood loss anemia  Acute blood loss thrombocytopenia  Chronic thrombocytopenia  Hgb 8.6; Plt 122K, no signs or symptoms of active bleeding  - Daily CBC  - Recommend CBC recheck outpatient with PCP once recovered from acute post-op period and thrombocytopenia workup as indicated    Anticoagulation:   Chronic anticoagulation for mechanical AVR, INR goal 2-3  - Warfarin for mechanical [place] AVR, INR goal 2-3. Most recent INR 2.18.      MSK/Skin:  Sternotomy  - PT/OT  - Sternal precautions    Prophylaxis:   - DVT prophylaxis: anticoagulation , SCD    Disposition:   - Transferred to  on 7/18  - Therapies recommending discharge to home. Patient & spouse are concerned about discharge to home.   Medically Ready for Discharge: Anticipated Tomorrow    Clinically Significant Risk Factors          # Hyperchloremia: Highest Cl = 110 mmol/L in last 2 days, will monitor as appropriate      # Hypocalcemia: Lowest Ca = 8.2 mg/dL in last 2 days, will monitor and replace as appropriate     # Hypoalbuminemia: Lowest albumin = 2.8 g/dL at 7/16/2025  4:15 PM, will monitor as appropriate   # Thrombocytopenia: Lowest platelets = 100 in last 2 days, will monitor for bleeding                         Discussed with Dr. Malave through written communication.      Wendi Johnson PA-C  Cardiothoracic Surgery  Pager [phone]    6:50 AM July 20, 2025        Interval History:     No overnight events.  States pain is adequately managed on current regimen.   Tolerating diet. Notes nausea this after eating yogurt (notes he never eats yogurt at home). Improved  "with antinausea meds. Is passing frequent gas and belching. One episode of emesis. +BM. No abdominal pain at rest or with palpation.  Breathing well without complaints.   Working with therapies and ambulating in halls with assistance. Felt lightheaded today  Denies chest pain, palpitations, dizziness, syncopal symptoms, fevers, chills, myalgias, or sternal popping/clicking.         Physical Exam:   Blood pressure 95/65, pulse 77, temperature 98.3  F (36.8  C), temperature source Oral, resp. rate 20, height 1.727 m (5' 8\"), weight 65.3 kg (143 lb 14.4 oz), SpO2 100%.  Vitals:    07/17/25 0600 07/19/25 0005 07/20/25 0451   Weight: 69.4 kg (153 lb) 67.9 kg (149 lb 9.6 oz) 65.3 kg (143 lb 14.4 oz)      Gen: A&Ox4, NAD  Neuro: Intact with no focal deficits   CV: RRR, normal S1 S2, +mechanical valve click, no murmurs, rubs or gallops. no JVD  Pulm: CTA, no wheezing or rhonchi, unlabored breathing on RA  Abd: nondistended, normal BS, soft, nontender  Ext: no peripheral edema, no pitting  Skin:  Sternal incision: clean, dry, intact, no erythema, sternum stable  Tubes/drain sites: dressing clean and dry           Data:    Imaging:  reviewed recent imaging, no acute concerns  XR Chest Port 1 View  Narrative: Exam: XR CHEST PORT 1 VIEW  7/19/2025 1:11 PM     History:  s/p chest tube pull       Comparison: Chest x-ray 7/19/2025.    Findings: Portable, semiupright, AP view of the chest. The sternotomy  wires appear intact. Aortic valve prosthesis. Chest tube/mediastinal  drains removed.    Trachea is midline. The cardiomediastinal silhouette is within normal  limits. No significant pleural effusion. Trace left apical  pneumothorax. No focal airspace opacity. The visualized upper abdomen  is unremarkable.  Impression: Impression:  1. Stable trace left apical pneumothorax. Interval removal of the  chest tubes.  2. Trace basilar atelectasis.    I have personally reviewed the examination and initial interpretation  and I agree with " the findings.    CHENG MUNIZ DO         SYSTEM ID:  [id number]  XR Chest Port 1 View  Narrative: Exam: XR CHEST PORT 1 VIEW, 7/19/2025 9:44 AM    Indication: Post Op CVTS Surgery    Comparison: 7/18/2025    Findings:   Stable mediastinal drains and left basilar chest tube. Intact median  sternotomy wires with aortic valve prosthesis. The cardiomediastinal  silhouette and pulmonary vasculature are within normal limits. No  appreciable pleural effusion. Trace left apical pneumothorax. Slightly  decreased streaky perihilar and bibasilar opacities.  Impression: Impression:   1. Stable trace left apical pneumothorax with chest tube in place.  2. Decreased streaky perihilar and bibasilar atelectasis.    KATIE REYES DO         SYSTEM ID:  [id number]        Labs:  BMP  Recent Labs   Lab 07/20/25  0435 07/19/25  0545 07/18/25  1331 07/18/25  0329 07/17/25  1247 07/17/25  0353 07/17/25  0348    137  --  138  --   --  143   POTASSIUM 4.1 3.9 4.4 3.3*  --   --  3.9   CHLORIDE 110* 108*  --  107  --   --  111*   NURIA 8.4* 8.2*  --  7.6*  --   --  7.8*   CO2 22 23  --  26  --   --  23   BUN 6.9 6.5  --  7.7  --   --  10.4   CR 0.75 0.72  --  0.78  --   --  0.89   GLC 98 138*  --  141* 114*   < > 126*    < > = values in this interval not displayed.     CBC  Recent Labs   Lab 07/20/25  0435 07/19/25  0545 07/18/25  0329 07/17/25  1312   WBC 3.6* 6.9 6.9 8.1   RBC 2.94* 2.90* 2.74* 2.83*   HGB 8.6* 8.6* 8.1* 8.5*   HCT 26.2* 25.7* 24.5* 24.7*   MCV 89 89 89 87   MCH 29.3 29.7 29.6 30.0   MCHC 32.8 33.5 33.1 34.4   RDW 13.9 13.6 13.5 13.4   * 100* 88* 117*     INR  Recent Labs   Lab 07/20/25  0435 07/19/25  0545 07/18/25  0329 07/17/25  0836   INR 2.18* 2.42* 1.75* 1.56*      Hepatic Panel  Recent Labs   Lab 07/19/25  0545 07/18/25  0329 07/17/25  0348 07/16/25  1615   AST 38 46* 48* 44   ALT 12 11 9 10   ALKPHOS 53 39* 38* 44   BILITOTAL 0.5 0.6 1.3* 1.4*   ALBUMIN 3.3* 3.1* 3.4* 2.8*     GLUCOSE:   Recent Labs   Lab  07/20/25  0435 07/19/25  0545 07/18/25  0329 07/17/25  1247 07/17/25  0840 07/17/25  0353   GLC 98 138* 141* 114* 164* 123*

## 2025-07-21 ENCOUNTER — APPOINTMENT (OUTPATIENT)
Dept: ULTRASOUND IMAGING | Facility: CLINIC | Age: 30
End: 2025-07-21
Payer: COMMERCIAL

## 2025-07-21 ENCOUNTER — APPOINTMENT (OUTPATIENT)
Dept: GENERAL RADIOLOGY | Facility: CLINIC | Age: 30
End: 2025-07-21
Payer: COMMERCIAL

## 2025-07-21 ENCOUNTER — DOCUMENTATION ONLY (OUTPATIENT)
Dept: ANTICOAGULATION | Facility: CLINIC | Age: 30
End: 2025-07-21

## 2025-07-21 ENCOUNTER — APPOINTMENT (OUTPATIENT)
Dept: OCCUPATIONAL THERAPY | Facility: CLINIC | Age: 30
End: 2025-07-21
Attending: SURGERY
Payer: COMMERCIAL

## 2025-07-21 LAB
ALBUMIN SERPL BCG-MCNC: 4 G/DL (ref 3.5–5.2)
ALP SERPL-CCNC: 125 U/L (ref 40–150)
ALT SERPL W P-5'-P-CCNC: 66 U/L (ref 0–70)
AMYLASE SERPL-CCNC: 26 U/L (ref 28–100)
ANION GAP SERPL CALCULATED.3IONS-SCNC: 14 MMOL/L (ref 7–15)
ANION GAP SERPL CALCULATED.3IONS-SCNC: 9 MMOL/L (ref 7–15)
AST SERPL W P-5'-P-CCNC: 93 U/L (ref 0–45)
BILIRUB SERPL-MCNC: 0.7 MG/DL
BILIRUBIN DIRECT (ROCHE PRO & PURE): 0.36 MG/DL (ref 0–0.45)
BUN SERPL-MCNC: 6.8 MG/DL (ref 6–20)
BUN SERPL-MCNC: 7.2 MG/DL (ref 6–20)
CALCIUM SERPL-MCNC: 8.6 MG/DL (ref 8.8–10.4)
CALCIUM SERPL-MCNC: 9 MG/DL (ref 8.8–10.4)
CHLORIDE SERPL-SCNC: 106 MMOL/L (ref 98–107)
CHLORIDE SERPL-SCNC: 106 MMOL/L (ref 98–107)
CREAT SERPL-MCNC: 0.73 MG/DL (ref 0.67–1.17)
CREAT SERPL-MCNC: 0.76 MG/DL (ref 0.67–1.17)
EGFRCR SERPLBLD CKD-EPI 2021: >90 ML/MIN/1.73M2
EGFRCR SERPLBLD CKD-EPI 2021: >90 ML/MIN/1.73M2
ERYTHROCYTE [DISTWIDTH] IN BLOOD BY AUTOMATED COUNT: 13.6 % (ref 10–15)
ERYTHROCYTE [DISTWIDTH] IN BLOOD BY AUTOMATED COUNT: 13.7 % (ref 10–15)
GGT SERPL-CCNC: 152 U/L (ref 8–61)
GLUCOSE SERPL-MCNC: 116 MG/DL (ref 70–99)
GLUCOSE SERPL-MCNC: 123 MG/DL (ref 70–99)
HCO3 SERPL-SCNC: 21 MMOL/L (ref 22–29)
HCO3 SERPL-SCNC: 23 MMOL/L (ref 22–29)
HCT VFR BLD AUTO: 27 % (ref 40–53)
HCT VFR BLD AUTO: 28.5 % (ref 40–53)
HGB BLD-MCNC: 8.9 G/DL (ref 13.3–17.7)
HGB BLD-MCNC: 9.4 G/DL (ref 13.3–17.7)
INR PPP: 2.01 (ref 0.85–1.15)
LACTATE SERPL-SCNC: 1.5 MMOL/L (ref 0.7–2)
LIPASE SERPL-CCNC: 21 U/L (ref 13–60)
MAGNESIUM SERPL-MCNC: 2 MG/DL (ref 1.7–2.3)
MCH RBC QN AUTO: 29.6 PG (ref 26.5–33)
MCH RBC QN AUTO: 29.9 PG (ref 26.5–33)
MCHC RBC AUTO-ENTMCNC: 33 G/DL (ref 31.5–36.5)
MCHC RBC AUTO-ENTMCNC: 33 G/DL (ref 31.5–36.5)
MCV RBC AUTO: 90 FL (ref 78–100)
MCV RBC AUTO: 91 FL (ref 78–100)
PATH REPORT.COMMENTS IMP SPEC: NORMAL
PATH REPORT.COMMENTS IMP SPEC: NORMAL
PATH REPORT.FINAL DX SPEC: NORMAL
PATH REPORT.GROSS SPEC: NORMAL
PATH REPORT.MICROSCOPIC SPEC OTHER STN: NORMAL
PATH REPORT.RELEVANT HX SPEC: NORMAL
PHOSPHATE SERPL-MCNC: 3.7 MG/DL (ref 2.5–4.5)
PHOTO IMAGE: NORMAL
PLATELET # BLD AUTO: 146 10E3/UL (ref 150–450)
PLATELET # BLD AUTO: 162 10E3/UL (ref 150–450)
POTASSIUM SERPL-SCNC: 3.7 MMOL/L (ref 3.4–5.3)
POTASSIUM SERPL-SCNC: 3.8 MMOL/L (ref 3.4–5.3)
PROT SERPL-MCNC: 7.2 G/DL (ref 6.4–8.3)
PROTHROMBIN TIME: 22.4 SECONDS (ref 11.8–14.8)
RBC # BLD AUTO: 2.98 10E6/UL (ref 4.4–5.9)
RBC # BLD AUTO: 3.18 10E6/UL (ref 4.4–5.9)
SODIUM SERPL-SCNC: 138 MMOL/L (ref 135–145)
SODIUM SERPL-SCNC: 141 MMOL/L (ref 135–145)
WBC # BLD AUTO: 5.1 10E3/UL (ref 4–11)
WBC # BLD AUTO: 6 10E3/UL (ref 4–11)

## 2025-07-21 PROCEDURE — 97535 SELF CARE MNGMENT TRAINING: CPT | Mod: GO

## 2025-07-21 PROCEDURE — 82150 ASSAY OF AMYLASE: CPT

## 2025-07-21 PROCEDURE — 250N000013 HC RX MED GY IP 250 OP 250 PS 637

## 2025-07-21 PROCEDURE — 85027 COMPLETE CBC AUTOMATED: CPT

## 2025-07-21 PROCEDURE — 250N000013 HC RX MED GY IP 250 OP 250 PS 637: Performed by: SURGERY

## 2025-07-21 PROCEDURE — 250N000011 HC RX IP 250 OP 636

## 2025-07-21 PROCEDURE — 258N000003 HC RX IP 258 OP 636

## 2025-07-21 PROCEDURE — 85018 HEMOGLOBIN: CPT

## 2025-07-21 PROCEDURE — 74018 RADEX ABDOMEN 1 VIEW: CPT

## 2025-07-21 PROCEDURE — 85610 PROTHROMBIN TIME: CPT

## 2025-07-21 PROCEDURE — 74018 RADEX ABDOMEN 1 VIEW: CPT | Mod: 26 | Performed by: RADIOLOGY

## 2025-07-21 PROCEDURE — 76705 ECHO EXAM OF ABDOMEN: CPT | Mod: 26 | Performed by: RADIOLOGY

## 2025-07-21 PROCEDURE — 80048 BASIC METABOLIC PNL TOTAL CA: CPT

## 2025-07-21 PROCEDURE — 82248 BILIRUBIN DIRECT: CPT

## 2025-07-21 PROCEDURE — 84450 TRANSFERASE (AST) (SGOT): CPT

## 2025-07-21 PROCEDURE — 250N000011 HC RX IP 250 OP 636: Performed by: SURGERY

## 2025-07-21 PROCEDURE — 36415 COLL VENOUS BLD VENIPUNCTURE: CPT

## 2025-07-21 PROCEDURE — 83735 ASSAY OF MAGNESIUM: CPT | Performed by: SURGERY

## 2025-07-21 PROCEDURE — 82977 ASSAY OF GGT: CPT

## 2025-07-21 PROCEDURE — 71045 X-RAY EXAM CHEST 1 VIEW: CPT | Mod: 26 | Performed by: RADIOLOGY

## 2025-07-21 PROCEDURE — 120N000005 HC R&B MS OVERFLOW UMMC

## 2025-07-21 PROCEDURE — 97110 THERAPEUTIC EXERCISES: CPT | Mod: GO

## 2025-07-21 PROCEDURE — 83605 ASSAY OF LACTIC ACID: CPT

## 2025-07-21 PROCEDURE — 83690 ASSAY OF LIPASE: CPT

## 2025-07-21 PROCEDURE — 84100 ASSAY OF PHOSPHORUS: CPT | Performed by: SURGERY

## 2025-07-21 PROCEDURE — 71045 X-RAY EXAM CHEST 1 VIEW: CPT

## 2025-07-21 PROCEDURE — 76705 ECHO EXAM OF ABDOMEN: CPT

## 2025-07-21 PROCEDURE — 250N000013 HC RX MED GY IP 250 OP 250 PS 637: Performed by: PHYSICIAN ASSISTANT

## 2025-07-21 RX ORDER — ASPIRIN 81 MG/1
81 TABLET, CHEWABLE ORAL DAILY
Qty: 30 TABLET | Refills: 0 | Status: SHIPPED | OUTPATIENT
Start: 2025-07-21 | End: 2025-07-23

## 2025-07-21 RX ORDER — ACETAMINOPHEN 500 MG
1000 TABLET ORAL EVERY 6 HOURS PRN
Qty: 200 TABLET | Refills: 0 | Status: SHIPPED | OUTPATIENT
Start: 2025-07-21

## 2025-07-21 RX ORDER — POTASSIUM CHLORIDE 750 MG/1
20 TABLET, EXTENDED RELEASE ORAL ONCE
Status: COMPLETED | OUTPATIENT
Start: 2025-07-21 | End: 2025-07-21

## 2025-07-21 RX ORDER — SIMETHICONE 80 MG
80 TABLET,CHEWABLE ORAL EVERY 6 HOURS PRN
Qty: 40 TABLET | Refills: 0 | Status: SHIPPED | OUTPATIENT
Start: 2025-07-21 | End: 2025-07-23

## 2025-07-21 RX ORDER — ONDANSETRON 4 MG/1
4 TABLET, ORALLY DISINTEGRATING ORAL EVERY 6 HOURS PRN
Qty: 21 TABLET | Refills: 0 | Status: SHIPPED | OUTPATIENT
Start: 2025-07-21

## 2025-07-21 RX ORDER — METOPROLOL SUCCINATE 25 MG/1
25 TABLET, EXTENDED RELEASE ORAL DAILY
Qty: 30 TABLET | Refills: 0 | Status: SHIPPED | OUTPATIENT
Start: 2025-07-22 | End: 2025-07-23

## 2025-07-21 RX ORDER — METHOCARBAMOL 500 MG/1
1000 TABLET, FILM COATED ORAL ONCE
Status: COMPLETED | OUTPATIENT
Start: 2025-07-21 | End: 2025-07-21

## 2025-07-21 RX ORDER — METHOCARBAMOL 750 MG/1
750 TABLET, FILM COATED ORAL 4 TIMES DAILY PRN
Qty: 80 TABLET | Refills: 0 | Status: SHIPPED | OUTPATIENT
Start: 2025-07-21 | End: 2025-07-23

## 2025-07-21 RX ORDER — ONDANSETRON 4 MG/1
8 TABLET, ORALLY DISINTEGRATING ORAL EVERY 6 HOURS PRN
Status: DISCONTINUED | OUTPATIENT
Start: 2025-07-21 | End: 2025-07-23 | Stop reason: HOSPADM

## 2025-07-21 RX ORDER — WARFARIN SODIUM 1 MG/1
TABLET ORAL
Qty: 120 TABLET | Refills: 0 | Status: ACTIVE | OUTPATIENT
Start: 2025-07-21 | End: 2025-07-23

## 2025-07-21 RX ORDER — ONDANSETRON 2 MG/ML
8 INJECTION INTRAMUSCULAR; INTRAVENOUS EVERY 6 HOURS PRN
Status: DISCONTINUED | OUTPATIENT
Start: 2025-07-21 | End: 2025-07-23 | Stop reason: HOSPADM

## 2025-07-21 RX ORDER — ACETAMINOPHEN 325 MG/10.15ML
975 LIQUID ORAL EVERY 6 HOURS
Status: DISCONTINUED | OUTPATIENT
Start: 2025-07-21 | End: 2025-07-23

## 2025-07-21 RX ORDER — AMOXICILLIN 250 MG
1 CAPSULE ORAL 2 TIMES DAILY
Qty: 30 TABLET | Refills: 0 | Status: SHIPPED | OUTPATIENT
Start: 2025-07-21 | End: 2025-07-23

## 2025-07-21 RX ORDER — WARFARIN SODIUM 1 MG/1
2 TABLET ORAL
Status: COMPLETED | OUTPATIENT
Start: 2025-07-21 | End: 2025-07-21

## 2025-07-21 RX ORDER — POLYETHYLENE GLYCOL 3350 17 G/17G
17 POWDER, FOR SOLUTION ORAL 2 TIMES DAILY PRN
Qty: 510 G | Refills: 0 | Status: SHIPPED | OUTPATIENT
Start: 2025-07-21

## 2025-07-21 RX ORDER — METHOCARBAMOL 100 MG/ML
1000 INJECTION, SOLUTION INTRAMUSCULAR; INTRAVENOUS EVERY 8 HOURS
Status: COMPLETED | OUTPATIENT
Start: 2025-07-21 | End: 2025-07-22

## 2025-07-21 RX ORDER — SODIUM CHLORIDE, SODIUM LACTATE, POTASSIUM CHLORIDE, CALCIUM CHLORIDE 600; 310; 30; 20 MG/100ML; MG/100ML; MG/100ML; MG/100ML
INJECTION, SOLUTION INTRAVENOUS CONTINUOUS
Status: DISPENSED | OUTPATIENT
Start: 2025-07-21 | End: 2025-07-22

## 2025-07-21 RX ORDER — CALCIUM CARBONATE 500 MG/1
2 TABLET, CHEWABLE ORAL 4 TIMES DAILY PRN
Qty: 40 TABLET | Refills: 0 | Status: SHIPPED | OUTPATIENT
Start: 2025-07-21 | End: 2025-07-23

## 2025-07-21 RX ADMIN — POTASSIUM CHLORIDE 20 MEQ: 750 TABLET, EXTENDED RELEASE ORAL at 20:29

## 2025-07-21 RX ADMIN — METHOCARBAMOL 750 MG: 750 TABLET ORAL at 20:29

## 2025-07-21 RX ADMIN — METHOCARBAMOL 1000 MG: 500 TABLET ORAL at 16:45

## 2025-07-21 RX ADMIN — SENNOSIDES AND DOCUSATE SODIUM 1 TABLET: 50; 8.6 TABLET ORAL at 07:40

## 2025-07-21 RX ADMIN — ONDANSETRON 4 MG: 2 INJECTION INTRAMUSCULAR; INTRAVENOUS at 10:44

## 2025-07-21 RX ADMIN — SIMETHICONE 80 MG: 80 TABLET, CHEWABLE ORAL at 07:40

## 2025-07-21 RX ADMIN — WARFARIN SODIUM 2 MG: 1 TABLET ORAL at 18:03

## 2025-07-21 RX ADMIN — ASPIRIN 81 MG CHEWABLE TABLET 81 MG: 81 TABLET CHEWABLE at 07:40

## 2025-07-21 RX ADMIN — SODIUM CHLORIDE, SODIUM LACTATE, POTASSIUM CHLORIDE, AND CALCIUM CHLORIDE: .6; .31; .03; .02 INJECTION, SOLUTION INTRAVENOUS at 22:20

## 2025-07-21 RX ADMIN — Medication 12.5 MG: at 07:40

## 2025-07-21 RX ADMIN — METHOCARBAMOL 1000 MG: 100 INJECTION, SOLUTION INTRAMUSCULAR; INTRAVENOUS at 22:25

## 2025-07-21 RX ADMIN — METHOCARBAMOL 750 MG: 750 TABLET ORAL at 07:40

## 2025-07-21 RX ADMIN — PROCHLORPERAZINE EDISYLATE 10 MG: 5 INJECTION INTRAMUSCULAR; INTRAVENOUS at 12:27

## 2025-07-21 RX ADMIN — PROCHLORPERAZINE EDISYLATE 10 MG: 5 INJECTION INTRAMUSCULAR; INTRAVENOUS at 20:19

## 2025-07-21 RX ADMIN — SIMETHICONE 80 MG: 80 TABLET, CHEWABLE ORAL at 18:03

## 2025-07-21 RX ADMIN — CALCIUM CARBONATE (ANTACID) CHEW TAB 500 MG 1000 MG: 500 CHEW TAB at 07:40

## 2025-07-21 RX ADMIN — ACETAMINOPHEN 975 MG: 325 TABLET ORAL at 18:02

## 2025-07-21 ASSESSMENT — ACTIVITIES OF DAILY LIVING (ADL)
ADLS_ACUITY_SCORE: 42
ADLS_ACUITY_SCORE: 40
ADLS_ACUITY_SCORE: 42
ADLS_ACUITY_SCORE: 40
ADLS_ACUITY_SCORE: 42
ADLS_ACUITY_SCORE: 40
ADLS_ACUITY_SCORE: 40

## 2025-07-21 NOTE — PROGRESS NOTES
Care Management Follow Up    Length of Stay (days): 5  Expected Discharge Date: 07/21/2025    Concerns to be Addressed: Outpatient Cardiac Rehab Referral      Additional Information:    CHW delegated by RNCC, has faxed OP CR referral to [facility] in [facility] at [phone].    RNCC notified, no further action taken.       Mima Harris  Community Health Worker  6A, 6B, 6C  P: [phone]  F: [phone]

## 2025-07-21 NOTE — PLAN OF CARE
30 year old male with PMH congenital subaortic stenosis s/p bAVR (2013), GIB, and thrombocytopenia. Presents to [facility] for re-do sternotomy IRVING, aortic root enlargement repair and mechanical AVR with Dr. Malave on 7/16/25.     Neuro: Aox4, calm and coooperative. Family around all shift. Family primarily Somalian speaking.   Resp: RA >92%. Denies SOB. LS clear and diminished in bases.  Cardiac: SR/ST 80-110s. Denies chest pain and palpitations. VSS.  GI/: Regular diet, poor appetite. Persistent nausea with emesis episode this shift. Given IV Zofran and Compazine with minimal effect. Utilizing other methods to decrease nausea as able.   Skin: Old midsternal and CT sites PALMER and WDL.    Pain: Denies  Activity: SBA  LDAs: R + L hand PIV SL and WDL     Will continue to monitor and report changes to team.

## 2025-07-21 NOTE — PROGRESS NOTES
Cardiovascular Surgery Progress Note  07/21/2025         Assessment and Plan:     Leonel Mack is a 30 year old male with PMH congenital subaortic stenosis s/p bAVR (2013), GIB, and thrombocytopenia. Presents to [facility] for re-do sternotomy IRVING, aortic root enlargement repair and mechanical AVR with Dr. Malave on 7/16/25.     Cardiovascular:   Aortic stenosis of bio AVR - s/p redo IRVING, aortic root enlargement repair and mechanical AVR on 7/16 w/ Dr. Malave  Hx Congenital aortic stenosis s/p bio AVR in 2013  Undifferentiated shock; likely vasoplegia, resolved  Orthostatic hypotension  Retained cut pacing wires  No arrhythmia, in NSR with HR 70s-90s, MAPS 80s-90s  Pre-op TTE: severely stenotic bio AVR, AV MG 50-55%  Post-op TTE 7/19: LV EF 65-70%, mechanical AVR with MG 19 mmHg, no pericardial effusion  - ASA 81 mg  - statin not indicated  - metoprolol 12.5 mg BID ordered with hold parameters    Chest tubes: chest tubes x 3 removed 7/19   TPW: CUT per surgeon due to therapeutic INR    Pulmonary:  Small pleural effusions  - Extubated POD 1 to 4 lpm via NC. Now saturating well on RA  - Supplemental O2 PRN to keep sats > 92%. Wean off as tolerated.  - Pulm toilet, IS, activity and deep breathing  - 7/21 CXR with clear lung fields, small pleural effusions    Neurology /Psych:  Acute post-operative pain  - Acute post-operative pain well controlled with scheduled acetaminophen, scheduled methocarbamol, PO oxycodone PRN     / Renal:  Hypovolemia, resolved  - No hx of renal disease. Most recent creatinine 0.75, adequate UOP   - Pre-op weight 145 lbs, most recent weight 143 lbs  - 24 hour output: net -2.7L  - Initially felt that some of patient's symptoms might be secondary to hypovolemia in the setting of poor oral nutritional and liquid intake with robust urine output. Gave 1L LR on 7/20 with some improvement in symptoms. Encouraged oral intake especially of salty foods.    GI / FEN:   PONV  - Regular diet  - frequent  flatus, +BM 7/19, continue bowel regimen  - simethicone TID on 7/19 for gas  - TUMS, zofran, compazine PRN  - abdominal exam completely benign 7/21. Abdominal xrays 7/20 and 7/21 with non-obstructive pattern  - RUQ US 7/21 without any unusual findings  - Replace electrolytes as needed  - amylase, lipase not elevated   - GGT pending, lactic pending, LFTS pending    Endocrine:  Pre-diabetes  Stress-induced leukocytosis  Pre-op Hgb A1C 5.8  - Managed on insulin drip postop, transitioned to sliding scale goal BG <180 and has now also been discontinued due to good BG control with no insulin need.     Infectious Disease:  - WBC 5.1, remains afebrile, no signs or symptoms of infection  - Completed perioperative antibiotics    Hematology:   Acute blood loss anemia  Acute blood loss thrombocytopenia  Chronic thrombocytopenia  Hgb 8.9; Plt 146k, no signs or symptoms of active bleeding  - Daily CBC  - Recommend CBC recheck outpatient with PCP once recovered from acute post-op period and thrombocytopenia workup as indicated    Anticoagulation:   Chronic anticoagulation for mechanical AVR, INR goal 2-3  - Warfarin for mechanical [place] AVR, INR goal 2-3. Most recent INR 2.01    MSK/Skin:  Sternotomy  Muscle spasms?  - PT/OT  - Sternal precautions  - Having involuntary muscle spasms in his neck starting 7/21 afternoon. Intermittent, bilateral - appear to be spasms of platysmus/sternocleidomastoid muscles bilaterally. Reports discomfort only during spasms. Alert & oriented and neuro exam completely intact.  - Will trial muscle relaxer. Repeat labs ordered, pending. Presentation not consistent with seizure or stroke, suspect muscle spasms to be most likely, though etiology not completely clear    Prophylaxis:   - DVT prophylaxis: anticoagulation , SCD    Disposition:   - Transferred to  on 7/18  - Therapies recommending discharge to home.   Medically Ready for Discharge: Anticipated Tomorrow    Clinically Significant Risk  "Factors          # Hyperchloremia: Highest Cl = 110 mmol/L in last 2 days, will monitor as appropriate          # Hypoalbuminemia: Lowest albumin = 2.8 g/dL at 7/16/2025  4:15 PM, will monitor as appropriate   # Thrombocytopenia: Lowest platelets = 122 in last 2 days, will monitor for bleeding                         Discussed with Dr. Malave.    Wendi Johnson PA-C  Cardiothoracic Surgery  Pager [phone]    1:12 PM July 21, 2025        Interval History:     Mild nausea overnight.   Nausea with emesis early afternoon, completely resolved by afternoon. No abdominal pain ever. Passing flatus. Last BM 2 days ago.  Denies chest pain. Denies dizziness.  States pain is adequately managed on current regimen.   Breathing well without complaints.   Working with therapies and ambulating in halls with assistance. Felt lightheaded today  Denies chest pain, palpitations, dizziness, syncopal symptoms, fevers, chills, myalgias, or sternal popping/clicking.         Physical Exam:   Blood pressure 109/77, pulse 91, temperature 98.9  F (37.2  C), temperature source Oral, resp. rate 16, height 1.727 m (5' 8\"), weight 64.9 kg (143 lb 1.6 oz), SpO2 99%.  Vitals:    07/19/25 0005 07/20/25 0451 07/21/25 0526   Weight: 67.9 kg (149 lb 9.6 oz) 65.3 kg (143 lb 14.4 oz) 64.9 kg (143 lb 1.6 oz)      Gen: A&Ox4, NAD  Neuro: Intact with no focal deficits   CV: RRR, normal S1 S2, +mechanical valve click, no murmurs, rubs or gallops. no JVD  Pulm: CTA, no wheezing or rhonchi, unlabored breathing on RA  Abd: nondistended, normal BS, soft, nontender  Ext: no peripheral edema, no pitting  Skin:  Sternal incision: clean, dry, intact, no erythema, sternum stable  Tubes/drain sites: dressing clean and dry           Data:    Imaging:  reviewed recent imaging, no acute concerns  XR Chest Port 1 View  Narrative: Chest X-ray, portable AP    History: Postop CVTS surgery  Additional history on chart review: 30-year-old male with past medical  history of " congenital subaortic stenosis status post bAVR (2013), GI  bleed, and thrombocytopenia. Underwent redo sternotomy, mediastinal  lysis of adhesions, and redo aortic valve replacement on July 16, 2025    Comparison: Chest x-ray dated July 20, 2025.    Findings: Intact midline sternotomy wires. Postsurgical changes of  aortic valve replacement. There are no visible soft tissue or osseous  abnormalities. Sharp bilateral costophrenic angles. The trachea is  midline. Cardiomediastinal silhouette is within normal limits. Small  left apical pneumothorax. Streaky bibasilar perihilar opacification  similar to prior with no evidence of focal airspace opacification.   Impression: Impression: Stable small left apical pneumothorax.    I have personally reviewed the examination and initial interpretation  and I agree with the findings.    WILSON LEE MD         SYSTEM ID:  [id number]        Labs:  BMP  Recent Labs   Lab 07/21/25  0643 07/20/25  1147 07/20/25  0435 07/19/25  0545    137 140 137   POTASSIUM 3.8 3.8 4.1 3.9   CHLORIDE 106 106 110* 108*   NURIA 8.6* 8.3* 8.4* 8.2*   CO2 23 21* 22 23   BUN 7.2 8.2 6.9 6.5   CR 0.76 0.67 0.75 0.72   * 110* 98 138*     CBC  Recent Labs   Lab 07/21/25  0643 07/20/25  1147 07/20/25  0435 07/19/25  0545   WBC 5.1 4.7 3.6* 6.9   RBC 2.98* 2.93* 2.94* 2.90*   HGB 8.9* 8.7* 8.6* 8.6*   HCT 27.0* 25.9* 26.2* 25.7*   MCV 91 88 89 89   MCH 29.9 29.7 29.3 29.7   MCHC 33.0 33.6 32.8 33.5   RDW 13.6 13.8 13.9 13.6   * 127* 122* 100*     INR  Recent Labs   Lab 07/21/25  0643 07/20/25  0435 07/19/25  0545 07/18/25  0329   INR 2.01* 2.18* 2.42* 1.75*      Hepatic Panel  Recent Labs   Lab 07/19/25  0545 07/18/25  0329 07/17/25  0348 07/16/25  1615   AST 38 46* 48* 44   ALT 12 11 9 10   ALKPHOS 53 39* 38* 44   BILITOTAL 0.5 0.6 1.3* 1.4*   ALBUMIN 3.3* 3.1* 3.4* 2.8*     GLUCOSE:   Recent Labs   Lab 07/21/25  0643 07/20/25  1147 07/20/25  0435 07/19/25  0545 07/18/25  0329  07/17/25  1247   * 110* 98 138* 141* 114*

## 2025-07-21 NOTE — PROGRESS NOTES
Care Management Discharge Note    Discharge Date: 07/21/2025     Discharge Disposition: Home     Discharge Services: Outpatient Anticoagulation, OP CR    Discharge DME: None    Discharge Transportation: Family    Education Provided on the Discharge Plan: Yes  Persons Notified of Discharge Plans: PA, Pt, mother  Patient/Family in Agreement with the Plan: Yes    Additional Information:  Per PA, pt medically ready for discharge to home today.     Pt started on warfarin this admission and needs close follow up arranged. RNCC reached out to [facility] scheduling to request follow up tomorrow, 7/22 with an INR lab draw. Scheduling sent a message to patient's PCP, Dr. Pimentel to see if they can accommodate, awaiting call back.    OT recommending discharge to home with outpatient cardiac rehab, CHW assisting in sending referral to [facility]     Addendum 1330: Received update from PA that pt will not discharge today. RNCC was able to schedule post hospital follow up as below:    [facility] [place]  Phone: [phone]  Fax: [phone]  Pt scheduled for post hospital follow up and INR lab draw on Thursday 7/24/2025 at 2pm with Soniya Keen.     Confirmed bedside RN will provide warfarin education for pt and family.     Need to fax warfarin dosing summary to [facility] at discharge.     CC will continue to monitor patient's medical condition and progress towards discharge.  Radha Wu RN BSN  6C Unit Care Coordinator  Phone number: [phone]    SEARCHABLE in [facility] - search CARE COORDINATOR      [place] [employer] [place] ([phone]) Saturday & Sunday; ([phone]) [facility] Recognized Holidays      Units: 5A Onc Vocera & 5C Vocera      Units: 6B Vocera & 6C Vocera       Units: 7A SOT RNCC Vocera, 7B Med Surg Vocera, & 7C Med Surg Vocera      Units: 6A Vocera & 4A CVICU Vocera, 4C MICU Vocera, and 4E SICU Vocera       Units: 5 Ortho Vocera & 5 Med Surg Vocera      Units: 6 Med Surg Vocera & 8 Med  Surg Vocera

## 2025-07-21 NOTE — PROGRESS NOTES
ANTICOAGULATION  MANAGEMENT    Leonel Mack is being discharged from the [facility] Anticoagulation Management Program ([facility]).    Reason for discharge: care has been transferred to [facility]  [facility] received a referral for INR management but PCP and cardiology is [facility]. [facility] RN reached out to Marj Chinpt care coordinator and she confirmed this that yes, [facility] will be managing Mynor's INR.     [facility] referral closed    If patient needs warfarin management in the future, please send a new referral    Unique Davis RN

## 2025-07-21 NOTE — PLAN OF CARE
"NURSING PROGRESS NOTE  Shift Summary  Date: July 21, 2025     Neuro/Musculoskeletal: A&Ox4.   Cardiac: SR w/ BBB, HR 80s-100s. Goal SBP <140. VSS.           Respiratory: Sating in the 90s on RA.  GI/: Adequate urine output. LBM: 7/19/25. Pt reports gas, abdominal discomfort and indigestion, Scheduled simethicone and PRN Tums given.   Diet/Appetite: Tolerating regular diet.  Activity: SBA  Pain: denies  Skin: no new deficits noted.  LDAs + Drips/IVF: R&LPIV-SL.  Protocols/Labs: Mag, Phos and K+     Plan: Continue POC and report changes to the team.      Alpa Scott RN   [facility] ([facility]): [facility] ICU (Unit 6C)      Problem: Adult Inpatient Plan of Care  Goal: Plan of Care Review  Description: The Plan of Care Review/Shift note should be completed every shift.  The Outcome Evaluation is a brief statement about your assessment that the patient is improving, declining, or no change.  This information will be displayed automatically on your shift  note.  Outcome: Progressing  Flowsheets (Taken 7/21/2025 0014)  Plan of Care Reviewed With: patient  Overall Patient Progress: no change  Goal: Patient-Specific Goal (Individualized)  Description: You can add care plan individualizations to a care plan. Examples of Individualization might be:  \"Parent requests to be called daily at 9am for status\", \"I have a hard time hearing out of my right ear\", or \"Do not touch me to wake me up as it startles  me\".  Outcome: Progressing  Goal: Absence of Hospital-Acquired Illness or Injury  Outcome: Progressing  Intervention: Identify and Manage Fall Risk  Recent Flowsheet Documentation  Taken 7/20/2025 1930 by Alpa Scott, RN  Safety Promotion/Fall Prevention:   activity supervised   clutter free environment maintained   increased rounding and observation   increase visualization of patient   nonskid shoes/slippers when out of bed   patient and family education   " room door open   room near nurse's station   room organization consistent   safety round/check completed   supervised activity  Intervention: Prevent Skin Injury  Recent Flowsheet Documentation  Taken 7/20/2025 1930 by Alpa Scott RN  Body Position: position changed independently  Intervention: Prevent and Manage VTE (Venous Thromboembolism) Risk  Recent Flowsheet Documentation  Taken 7/20/2025 1930 by Alpa Scott RN  VTE Prevention/Management: SCDs off (sequential compression devices)  Intervention: Prevent Infection  Recent Flowsheet Documentation  Taken 7/20/2025 1930 by Alpa Scott RN  Infection Prevention:   cohorting utilized   environmental surveillance performed   equipment surfaces disinfected   hand hygiene promoted   personal protective equipment utilized   rest/sleep promoted   single patient room provided  Goal: Optimal Comfort and Wellbeing  Outcome: Progressing  Intervention: Provide Person-Centered Care  Recent Flowsheet Documentation  Taken 7/20/2025 1930 by Alpa Scott RN  Trust Relationship/Rapport:   care explained   choices provided   emotional support provided   empathic listening provided   questions answered   questions encouraged   reassurance provided   thoughts/feelings acknowledged  Goal: Readiness for Transition of Care  Outcome: Progressing     Problem: Cardiovascular Surgery  Goal: Improved Activity Tolerance  Outcome: Progressing  Goal: Optimal Coping with Heart Surgery  Outcome: Progressing  Intervention: Support Psychosocial Response to Surgery  Recent Flowsheet Documentation  Taken 7/20/2025 1930 by Alpa Scott RN  Family/Support System Care:   presence promoted   self-care encouraged   support provided   caregiver stress acknowledged  Goal: Absence of Bleeding  Outcome: Progressing  Goal: Effective Bowel Elimination  Outcome: Progressing  Intervention: Enhance Bowel Motility and Elimination  Recent Flowsheet  Documentation  Taken 7/20/2025 1930 by Alpa Scott RN  Bowel Motility Enhancement:   ambulation promoted   fluid intake encouraged   oral intake encouraged  Goal: Effective Cardiac Function  Outcome: Progressing  Goal: Optimal Cerebral Tissue Perfusion  Outcome: Progressing  Intervention: Protect and Optimize Cerebral Perfusion  Recent Flowsheet Documentation  Taken 7/20/2025 1930 by Alpa Scott RN  Sensory Stimulation Regulation:   care clustered   lighting decreased   quiet environment promoted  Head of Bed (HOB) Positioning: HOB at 30 degrees  Goal: Fluid and Electrolyte Balance  Outcome: Progressing  Goal: Blood Glucose Level Within Targeted Range  Outcome: Progressing  Goal: Absence of Infection Signs and Symptoms  Outcome: Progressing  Intervention: Prevent or Manage Infection  Recent Flowsheet Documentation  Taken 7/20/2025 1930 by Alpa Scott RN  Infection Prevention:   cohorting utilized   environmental surveillance performed   equipment surfaces disinfected   hand hygiene promoted   personal protective equipment utilized   rest/sleep promoted   single patient room provided  Goal: Anesthesia/Sedation Recovery  Outcome: Progressing  Intervention: Optimize Anesthesia Recovery  Recent Flowsheet Documentation  Taken 7/20/2025 1930 by Alpa Scott RN  Safety Promotion/Fall Prevention:   activity supervised   clutter free environment maintained   increased rounding and observation   increase visualization of patient   nonskid shoes/slippers when out of bed   patient and family education   room door open   room near nurse's station   room organization consistent   safety round/check completed   supervised activity  Reorientation Measures:   calendar in view   clock in view  Goal: Acceptable Pain Control  Outcome: Progressing  Goal: Nausea and Vomiting Relief  Outcome: Progressing  Goal: Effective Urinary Elimination  Outcome: Progressing  Goal: Effective Oxygenation and  Ventilation  Outcome: Progressing  Intervention: Promote Airway Secretion Clearance  Recent Flowsheet Documentation  Taken 7/20/2025 1930 by Alpa Scott RN  Cough And Deep Breathing: done with encouragement     Problem: Cardiac Surgery Discharge Checklist  Goal: Medically stable  Description: No IV medication for pain  No IV medication for BP  No IV medication from blood surgery  No IV medication for diuresis  Outcome: Progressing  Goal: Euvolemic  Outcome: Progressing  Goal: Voiding  Description: Voiding or has returned to intermittent dialysis  Outcome: Progressing  Goal: Adequate Nutritional Intake  Description: Tolerating adequate nutritional intake  Outcome: Progressing  Goal: Room air or tested to qualify for home O2  Description: Home discharge, on room air or has had testing to qualify for home oxygen  Outcome: Progressing  Goal: Device training  Outcome: Progressing  Goal: Adequate anti-coagulation  Outcome: Progressing  Goal: Drains and chest tubes removed  Outcome: Progressing  Goal: Post-op imaging and echo completed  Description: (Not applicable to all cases)  Outcome: Progressing  Goal: Wound Vac  Description: Transitioned to ambulatory wound vac (not applicable to all cases)  Outcome: Progressing   Goal Outcome Evaluation:      Plan of Care Reviewed With: patient    Overall Patient Progress: no change

## 2025-07-22 ENCOUNTER — APPOINTMENT (OUTPATIENT)
Dept: GENERAL RADIOLOGY | Facility: CLINIC | Age: 30
End: 2025-07-22
Payer: COMMERCIAL

## 2025-07-22 ENCOUNTER — APPOINTMENT (OUTPATIENT)
Dept: OCCUPATIONAL THERAPY | Facility: CLINIC | Age: 30
End: 2025-07-22
Attending: SURGERY
Payer: COMMERCIAL

## 2025-07-22 LAB
ANION GAP SERPL CALCULATED.3IONS-SCNC: 11 MMOL/L (ref 7–15)
ATRIAL RATE - MUSE: 82 BPM
BUN SERPL-MCNC: 8.9 MG/DL (ref 6–20)
CALCIUM SERPL-MCNC: 8.5 MG/DL (ref 8.8–10.4)
CHLORIDE SERPL-SCNC: 106 MMOL/L (ref 98–107)
CREAT SERPL-MCNC: 0.69 MG/DL (ref 0.67–1.17)
DIASTOLIC BLOOD PRESSURE - MUSE: NORMAL MMHG
EGFRCR SERPLBLD CKD-EPI 2021: >90 ML/MIN/1.73M2
ERYTHROCYTE [DISTWIDTH] IN BLOOD BY AUTOMATED COUNT: 13.8 % (ref 10–15)
GLUCOSE SERPL-MCNC: 127 MG/DL (ref 70–99)
HCO3 SERPL-SCNC: 21 MMOL/L (ref 22–29)
HCT VFR BLD AUTO: 25.9 % (ref 40–53)
HGB BLD-MCNC: 8.6 G/DL (ref 13.3–17.7)
INR PPP: 2.26 (ref 0.85–1.15)
INTERPRETATION ECG - MUSE: NORMAL
MCH RBC QN AUTO: 29.3 PG (ref 26.5–33)
MCHC RBC AUTO-ENTMCNC: 33.2 G/DL (ref 31.5–36.5)
MCV RBC AUTO: 88 FL (ref 78–100)
P AXIS - MUSE: 62 DEGREES
PLATELET # BLD AUTO: 155 10E3/UL (ref 150–450)
POTASSIUM SERPL-SCNC: 3.8 MMOL/L (ref 3.4–5.3)
PR INTERVAL - MUSE: 140 MS
PROTHROMBIN TIME: 24.5 SECONDS (ref 11.8–14.8)
QRS DURATION - MUSE: 106 MS
QT - MUSE: 370 MS
QTC - MUSE: 432 MS
R AXIS - MUSE: 31 DEGREES
RBC # BLD AUTO: 2.94 10E6/UL (ref 4.4–5.9)
SODIUM SERPL-SCNC: 138 MMOL/L (ref 135–145)
SYSTOLIC BLOOD PRESSURE - MUSE: NORMAL MMHG
T AXIS - MUSE: 105 DEGREES
VENTRICULAR RATE- MUSE: 82 BPM
WBC # BLD AUTO: 5.6 10E3/UL (ref 4–11)

## 2025-07-22 PROCEDURE — 250N000011 HC RX IP 250 OP 636: Performed by: SURGERY

## 2025-07-22 PROCEDURE — 250N000011 HC RX IP 250 OP 636

## 2025-07-22 PROCEDURE — 85014 HEMATOCRIT: CPT

## 2025-07-22 PROCEDURE — 250N000013 HC RX MED GY IP 250 OP 250 PS 637: Performed by: PHYSICIAN ASSISTANT

## 2025-07-22 PROCEDURE — 250N000013 HC RX MED GY IP 250 OP 250 PS 637

## 2025-07-22 PROCEDURE — 36415 COLL VENOUS BLD VENIPUNCTURE: CPT

## 2025-07-22 PROCEDURE — 120N000005 HC R&B MS OVERFLOW UMMC

## 2025-07-22 PROCEDURE — 250N000011 HC RX IP 250 OP 636: Performed by: PHYSICIAN ASSISTANT

## 2025-07-22 PROCEDURE — 97535 SELF CARE MNGMENT TRAINING: CPT | Mod: GO

## 2025-07-22 PROCEDURE — 250N000013 HC RX MED GY IP 250 OP 250 PS 637: Performed by: SURGERY

## 2025-07-22 PROCEDURE — 71045 X-RAY EXAM CHEST 1 VIEW: CPT | Mod: 26 | Performed by: RADIOLOGY

## 2025-07-22 PROCEDURE — 71045 X-RAY EXAM CHEST 1 VIEW: CPT

## 2025-07-22 PROCEDURE — 80048 BASIC METABOLIC PNL TOTAL CA: CPT

## 2025-07-22 PROCEDURE — 999N000127 HC STATISTIC PERIPHERAL IV START W US GUIDANCE

## 2025-07-22 PROCEDURE — 85610 PROTHROMBIN TIME: CPT

## 2025-07-22 RX ORDER — BISACODYL 10 MG
10 SUPPOSITORY, RECTAL RECTAL ONCE
Status: COMPLETED | OUTPATIENT
Start: 2025-07-22 | End: 2025-07-22

## 2025-07-22 RX ORDER — LIDOCAINE 4 G/G
1 PATCH TOPICAL EVERY 24 HOURS
Status: DISCONTINUED | OUTPATIENT
Start: 2025-07-22 | End: 2025-07-23 | Stop reason: HOSPADM

## 2025-07-22 RX ORDER — METHOCARBAMOL 500 MG/1
1000 TABLET, FILM COATED ORAL 4 TIMES DAILY PRN
Status: DISCONTINUED | OUTPATIENT
Start: 2025-07-22 | End: 2025-07-23 | Stop reason: HOSPADM

## 2025-07-22 RX ADMIN — PANTOPRAZOLE SODIUM 40 MG: 40 INJECTION, POWDER, FOR SOLUTION INTRAVENOUS at 20:31

## 2025-07-22 RX ADMIN — GLYCERIN 1 SUPPOSITORY: 2 SUPPOSITORY RECTAL at 20:32

## 2025-07-22 RX ADMIN — Medication 12.5 MG: at 20:31

## 2025-07-22 RX ADMIN — ACETAMINOPHEN 975 MG: 325 SOLUTION ORAL at 04:16

## 2025-07-22 RX ADMIN — WARFARIN SODIUM 1.5 MG: 3 TABLET ORAL at 18:40

## 2025-07-22 RX ADMIN — METHOCARBAMOL 1000 MG: 100 INJECTION, SOLUTION INTRAMUSCULAR; INTRAVENOUS at 06:14

## 2025-07-22 RX ADMIN — METHOCARBAMOL 1000 MG: 100 INJECTION, SOLUTION INTRAMUSCULAR; INTRAVENOUS at 14:55

## 2025-07-22 RX ADMIN — OXYCODONE HYDROCHLORIDE 10 MG: 10 TABLET ORAL at 20:31

## 2025-07-22 RX ADMIN — METHOCARBAMOL 1000 MG: 500 TABLET ORAL at 22:53

## 2025-07-22 RX ADMIN — PROCHLORPERAZINE EDISYLATE 10 MG: 5 INJECTION INTRAMUSCULAR; INTRAVENOUS at 10:24

## 2025-07-22 RX ADMIN — ASPIRIN 81 MG CHEWABLE TABLET 81 MG: 81 TABLET CHEWABLE at 08:42

## 2025-07-22 RX ADMIN — ONDANSETRON 8 MG: 4 TABLET, ORALLY DISINTEGRATING ORAL at 08:53

## 2025-07-22 RX ADMIN — LIDOCAINE 1 PATCH: 4 PATCH TOPICAL at 15:35

## 2025-07-22 RX ADMIN — BISACODYL 10 MG: 10 SUPPOSITORY RECTAL at 10:17

## 2025-07-22 ASSESSMENT — ACTIVITIES OF DAILY LIVING (ADL)
ADLS_ACUITY_SCORE: 40
ADLS_ACUITY_SCORE: 38
ADLS_ACUITY_SCORE: 40
ADLS_ACUITY_SCORE: 40
ADLS_ACUITY_SCORE: 38
ADLS_ACUITY_SCORE: 38
ADLS_ACUITY_SCORE: 40
ADLS_ACUITY_SCORE: 38
ADLS_ACUITY_SCORE: 38
ADLS_ACUITY_SCORE: 40
ADLS_ACUITY_SCORE: 38

## 2025-07-22 NOTE — PLAN OF CARE
Neuro: A&Ox4. Tired. Nausea continuous throughout the day, PRNs given.   Cardiac: ST HR . VSS.   Respiratory: Sating WDL on RA.  GI/: Adequate urine output. Suppository given x1.   Diet/appetite: Poor PO intake, nausea & vomiting.   Activity:  SBA, up in halls.  Pain: Continued to have neck spasm this shift, scheduled IV robaxin given & provider notified Lidocaine patch added.  Skin: No new deficits noted.  LDA's: L PIV - SL        Plan: Continue with POC. Notify primary team with changes.

## 2025-07-22 NOTE — PLAN OF CARE
"[id number]    BP 92/62 (BP Location: Left arm)   Pulse 99   Temp 99.3  F (37.4  C) (Oral)   Resp 16   Ht 1.727 m (5' 8\")   Wt 64.9 kg (143 lb 1.6 oz)   SpO2 98%   BMI 21.76 kg/m       Neuro: Alert and Oriented x4, flat affect   Cardiac: Sinus tachy, apical and radial pulse regular   Respiratory: Room air   GI/: Nausea and Vomiting, Pt. Given Compazine x1-minimal affect. Last BM had 2-3 days ago, Emesis x1   Diet/appetite: Regular diet-poor appetite  Activity:  Stand-by, gait-belt   Pain: Denies    Skin: Mid-sternal incision, Old CT sites   LDA's: R PIV, L PIV occluded/painful     Plan: Monitor    "

## 2025-07-22 NOTE — PROVIDER NOTIFICATION
Time of notification: 8:53 PM  Provider notified: Azeb Hess  Patient status: Nausea and Vomiting, Neck spasms w/ Tachy heart rate   Temp:  [98.5  F (36.9  C)-99.4  F (37.4  C)] 98.8  F (37.1  C)  Pulse:  [] 102  Resp:  [16-20] 16  BP: ()/(53-85) 117/72  SpO2:  [96 %-100 %] 99 %  Orders received: Fluids and switch to IV meds

## 2025-07-22 NOTE — CONSULTS
Care Management Follow Up    Length of Stay (days): 6    Expected Discharge Date: 07/22/2025     Concerns to be Addressed:     discharge planning  Patient plan of care discussed at interdisciplinary rounds: Yes    Anticipated Discharge Disposition:  home  Anticipated Discharge Services:  n/a  Anticipated Discharge DME:  n/a    Patient/family educated on Medicare website which has current facility and service quality ratings:  n/a  Education Provided on the Discharge Plan:  n/a  Patient/Family in Agreement with the Plan:  n/a    Referrals Placed by CM/SW:  n/a  Private pay costs discussed: Not applicable    Discussed  Partnership in Safe Discharge Planning  document with patient/family: No     Handoff Completed: No, handoff not indicated or clinically appropriate    Additional Information:  SW received a consult on 7/20 for the following--- Family would like to discuss resources for arranging home assistance after discharge.     Per bedside RN, no  Ipad at bedside but pt's sister is at bedside interpreting.    SW met with pt, sister, and mom at bedside. Mom was praying so SW spoke with pt's sister at bedside. Per sister, she wanted home care set up for the pt. SW informed sister that SW will need to update the RNCC as she is the one that sets up home care at discharge which the sister was understanding of. SW also updated sister on resources placed in AVS.    SW updated RNCC on sister wanting home care for the pt.     Next Steps: n/a    ___________________    RJ Hyde, HIMANSHU  6C   [facility] Acute Care Management  Phone: [phone]  Available on MDconnectME: 6C Cards ROLDAN Simeon

## 2025-07-22 NOTE — PROGRESS NOTES
Cardiovascular Surgery Progress Note  07/22/2025         Assessment and Plan:     Leonel Mack is a 30 year old male with PMH congenital subaortic stenosis s/p bAVR (2013), GIB, and thrombocytopenia. Presents to [facility] for re-do sternotomy IRVING, aortic root enlargement repair and mechanical AVR with Dr. Malave on 7/16/25.     Cardiovascular:   Aortic stenosis of bio AVR - s/p redo IRVING, aortic root enlargement repair and mechanical AVR on 7/16 w/ Dr. Malave  Hx Congenital aortic stenosis s/p bio AVR in 2013  Undifferentiated shock; likely vasoplegia, resolved  Orthostatic hypotension  Retained cut pacing wires  No arrhythmia, in NSR with HR 70s-90s, MAPS 80s-90s  Pre-op TTE: severely stenotic bio AVR, AV MG 50-55%  Post-op TTE 7/19: LV EF 65-70%, mechanical AVR with MG 19 mmHg, no pericardial effusion  - ASA 81 mg  - statin not indicated  - metoprolol 12.5 mg BID ordered with hold parameters  - repeat echo ordered for 7/23 to assess fluid status     Chest tubes: chest tubes x 3 removed 7/19   TPW: CUT per surgeon due to therapeutic INR    Pulmonary:  Small pleural effusions  - Extubated POD 1 to 4 lpm via NC. Now saturating well on RA  - Supplemental O2 PRN to keep sats > 92%. Wean off as tolerated.  - Pulm toilet, IS, activity and deep breathing  - 7/21 CXR with clear lung fields, small pleural effusions    Neurology /Psych:  Acute post-operative pain  - Acute post-operative pain well controlled with scheduled acetaminophen, scheduled methocarbamol, PO oxycodone PRN  - Neck spasms today, trial lidocaine patches      / Renal:  Hypovolemia, resolved  - No hx of renal disease. Most recent creatinine 0.73, adequate UOP   - Pre-op weight 145 lbs, most recent weight 143 lbs  - 24 hour output: net +903   - Initially felt that some of patient's symptoms might be secondary to hypovolemia in the setting of poor oral nutritional and liquid intake with robust urine output. Gave 1L LR on 7/20 with some improvement in symptoms.  Encouraged oral intake especially of salty foods.    GI / FEN:   PONV  - Regular diet  - frequent flatus, +BM 7/19, continue bowel regimen  - simethicone TID on 7/19 for gas  - TUMS, zofran, compazine PRN  - abdominal exam completely benign 7/21. Abdominal xrays 7/20 and 7/21 with non-obstructive pattern  - RUQ US 7/21 without any unusual findings  - Replace electrolytes as needed  - amylase, lipase not elevated   - LFTs mildly elevated, large differential, repeat again tomorrow   - 7/22- GI consult for continued vomiting with any oral intake. +BM today   GI will see tomorrow, did discuss patient with them- initial recommendations Pantoprazole IV BID, glycerin suppository BID     Endocrine:  Pre-diabetes  Stress-induced leukocytosis  Pre-op Hgb A1C 5.8  - Managed on insulin drip postop, transitioned to sliding scale goal BG <180 and has now also been discontinued due to good BG control with no insulin need.     Infectious Disease:  - WBC 5.6, remains afebrile, no signs or symptoms of infection  - Completed perioperative antibiotics    Hematology:   Acute blood loss anemia  Acute blood loss thrombocytopenia  Chronic thrombocytopenia  Hgb 8.6 Plt 155k, no signs or symptoms of active bleeding  - Daily CBC  - Recommend CBC recheck outpatient with PCP once recovered from acute post-op period and thrombocytopenia workup as indicated    Anticoagulation:   Chronic anticoagulation for mechanical AVR, INR goal 2-3  - Warfarin for mechanical [place] AVR, INR goal 2-3. Most recent INR 2.26    MSK/Skin:  Sternotomy  Muscle spasms?  - PT/OT  - Sternal precautions  - Having involuntary muscle spasms in his neck starting 7/21 afternoon. Intermittent, bilateral - appear to be spasms of platysmus/sternocleidomastoid muscles bilaterally. Reports discomfort only during spasms. Alert & oriented and neuro exam completely intact.  - Will trial muscle relaxer. Repeat labs ordered, pending. Presentation not consistent with seizure or  "stroke, suspect muscle spasms to be most likely, though etiology not completely clear  - 7/22- trial lidocaine patches, some possible relation to straining during vomiting?    Prophylaxis:   - DVT prophylaxis: anticoagulation , SCD    Disposition:   - Transferred to  on 7/18  - Therapies recommending discharge to home.   Medically Ready for Discharge: 1-2 days     Clinically Significant Risk Factors           # Hypocalcemia: Lowest Ca = 8.5 mg/dL in last 2 days, will monitor and replace as appropriate     # Hypoalbuminemia: Lowest albumin = 2.8 g/dL at 7/16/2025  4:15 PM, will monitor as appropriate                           Discussed with Dr. Malave.    Judy Tejeda PA-C  Cardiothoracic Surgery  Available on [employer]    1:12 PM July 21, 2025        Interval History:   Nausea continues and patient vomiting intermittently throughout the day with medications/pral intake. He did get a suppository today with medium sized BM. Passing flatus. Nothing remarkable on abdominal exam.   Additionally continues to have neck spasms, will trial lidocaine patch today.   Denies chest pain. Denies dizziness.  States pain is adequately managed on current regimen.   Breathing well without complaints.   Working with therapies and ambulating in halls with assistance.  Denies chest pain, palpitations, dizziness, syncopal symptoms, fevers, chills, myalgias, or sternal popping/clicking.         Physical Exam:   Blood pressure 101/64, pulse 89, temperature 98.9  F (37.2  C), temperature source Oral, resp. rate 21, height 1.727 m (5' 8\"), weight 64.9 kg (143 lb 1.6 oz), SpO2 98%.  Vitals:    07/19/25 0005 07/20/25 0451 07/21/25 0526   Weight: 67.9 kg (149 lb 9.6 oz) 65.3 kg (143 lb 14.4 oz) 64.9 kg (143 lb 1.6 oz)      Gen: A&Ox4, NAD  Neuro: Intact with no focal deficits   CV: tachy, normal S1 S2, +mechanical valve click, no murmurs, rubs or gallops. no JVD  Pulm: CTA, no wheezing or rhonchi, unlabored breathing on RA  Abd: nondistended, normal " BS, soft, nontender  Ext: no peripheral edema, no pitting  Skin:  Sternal incision: clean, dry, intact, no erythema, sternum stable  Tubes/drain sites: dressing clean and dry           Data:    Imaging:  reviewed recent imaging, no acute concerns  XR Chest Port 1 View  Narrative: Exam: XR CHEST PORT 1 VIEW, 7/22/2025 8:56 AM    Indication: Post Op CVTS Surgery    Comparison: Chest x-ray dated July 21, 2025 and CT chest dated May 22,  2025.    Findings:   Intact midline sternotomy wires. Aortic valve prosthesis.  Nonobstructive bowel gas pattern in the visualized upper abdomen.  Sharp bilateral costophrenic angles. Mild, streaky right-sided basilar  opacification, the lungs are otherwise normal. No visualized  pneumothorax. The trachea is midline. Cardiomediastinal silhouette is  within normal limits. No visualized acute osseous or soft tissue  abnormalities.   Impression: Impression: Stable postsurgical changes of aortic valve replacement  with no visualized residual pneumothoraces.    I have personally reviewed the examination and initial interpretation  and I agree with the findings.    WILSON LEE MD         SYSTEM ID:  [id number]        Labs:  BMP  Recent Labs   Lab 07/22/25  0540 07/21/25  1654 07/21/25  0643 07/20/25  1147    141 138 137   POTASSIUM 3.8 3.7 3.8 3.8   CHLORIDE 106 106 106 106   NURIA 8.5* 9.0 8.6* 8.3*   CO2 21* 21* 23 21*   BUN 8.9 6.8 7.2 8.2   CR 0.69 0.73 0.76 0.67   * 123* 116* 110*     CBC  Recent Labs   Lab 07/22/25  0540 07/21/25  1654 07/21/25  0643 07/20/25  1147   WBC 5.6 6.0 5.1 4.7   RBC 2.94* 3.18* 2.98* 2.93*   HGB 8.6* 9.4* 8.9* 8.7*   HCT 25.9* 28.5* 27.0* 25.9*   MCV 88 90 91 88   MCH 29.3 29.6 29.9 29.7   MCHC 33.2 33.0 33.0 33.6   RDW 13.8 13.7 13.6 13.8    162 146* 127*     INR  Recent Labs   Lab 07/22/25  0540 07/21/25  0643 07/20/25  0435 07/19/25  0545   INR 2.26* 2.01* 2.18* 2.42*      Hepatic Panel  Recent Labs   Lab 07/21/25  3095  07/19/25  0545 07/18/25  0329 07/17/25  0348   AST 93* 38 46* 48*   ALT 66 12 11 9   ALKPHOS 125 53 39* 38*   BILITOTAL 0.7 0.5 0.6 1.3*   ALBUMIN 4.0 3.3* 3.1* 3.4*     GLUCOSE:   Recent Labs   Lab 07/22/25  0540 07/21/25  1654 07/21/25  0643 07/20/25  1147 07/20/25  0435 07/19/25  0545   * 123* 116* 110* 98 138*

## 2025-07-23 ENCOUNTER — APPOINTMENT (OUTPATIENT)
Dept: CARDIOLOGY | Facility: CLINIC | Age: 30
End: 2025-07-23
Attending: PHYSICIAN ASSISTANT
Payer: COMMERCIAL

## 2025-07-23 ENCOUNTER — APPOINTMENT (OUTPATIENT)
Dept: OCCUPATIONAL THERAPY | Facility: CLINIC | Age: 30
End: 2025-07-23
Attending: SURGERY
Payer: COMMERCIAL

## 2025-07-23 ENCOUNTER — APPOINTMENT (OUTPATIENT)
Dept: GENERAL RADIOLOGY | Facility: CLINIC | Age: 30
End: 2025-07-23
Payer: COMMERCIAL

## 2025-07-23 VITALS
TEMPERATURE: 98.1 F | SYSTOLIC BLOOD PRESSURE: 98 MMHG | HEART RATE: 79 BPM | OXYGEN SATURATION: 100 % | WEIGHT: 137.5 LBS | HEIGHT: 68 IN | RESPIRATION RATE: 18 BRPM | BODY MASS INDEX: 20.84 KG/M2 | DIASTOLIC BLOOD PRESSURE: 64 MMHG

## 2025-07-23 LAB
ALBUMIN SERPL BCG-MCNC: 3.9 G/DL (ref 3.5–5.2)
ALP SERPL-CCNC: 124 U/L (ref 40–150)
ALT SERPL W P-5'-P-CCNC: 196 U/L (ref 0–70)
ANION GAP SERPL CALCULATED.3IONS-SCNC: 10 MMOL/L (ref 7–15)
AST SERPL W P-5'-P-CCNC: 169 U/L (ref 0–45)
BILIRUB SERPL-MCNC: 0.5 MG/DL
BILIRUBIN DIRECT (ROCHE PRO & PURE): 0.27 MG/DL (ref 0–0.45)
BUN SERPL-MCNC: 8.3 MG/DL (ref 6–20)
CALCIUM SERPL-MCNC: 9 MG/DL (ref 8.8–10.4)
CHLORIDE SERPL-SCNC: 103 MMOL/L (ref 98–107)
CREAT SERPL-MCNC: 0.73 MG/DL (ref 0.67–1.17)
EGFRCR SERPLBLD CKD-EPI 2021: >90 ML/MIN/1.73M2
ERYTHROCYTE [DISTWIDTH] IN BLOOD BY AUTOMATED COUNT: 13.8 % (ref 10–15)
GLUCOSE SERPL-MCNC: 104 MG/DL (ref 70–99)
HCO3 SERPL-SCNC: 22 MMOL/L (ref 22–29)
HCT VFR BLD AUTO: 29.1 % (ref 40–53)
HGB BLD-MCNC: 9.6 G/DL (ref 13.3–17.7)
INR PPP: 2.05 (ref 0.85–1.15)
LVEF ECHO: NORMAL
MAGNESIUM SERPL-MCNC: 2 MG/DL (ref 1.7–2.3)
MCH RBC QN AUTO: 29.5 PG (ref 26.5–33)
MCHC RBC AUTO-ENTMCNC: 33 G/DL (ref 31.5–36.5)
MCV RBC AUTO: 90 FL (ref 78–100)
PHOSPHATE SERPL-MCNC: 3.5 MG/DL (ref 2.5–4.5)
PLATELET # BLD AUTO: 207 10E3/UL (ref 150–450)
POTASSIUM SERPL-SCNC: 3.9 MMOL/L (ref 3.4–5.3)
PROT SERPL-MCNC: 7 G/DL (ref 6.4–8.3)
PROTHROMBIN TIME: 23.2 SECONDS (ref 11.8–14.8)
RBC # BLD AUTO: 3.25 10E6/UL (ref 4.4–5.9)
SODIUM SERPL-SCNC: 135 MMOL/L (ref 135–145)
WBC # BLD AUTO: 6.4 10E3/UL (ref 4–11)

## 2025-07-23 PROCEDURE — 71045 X-RAY EXAM CHEST 1 VIEW: CPT | Mod: 26 | Performed by: RADIOLOGY

## 2025-07-23 PROCEDURE — 93321 DOPPLER ECHO F-UP/LMTD STD: CPT

## 2025-07-23 PROCEDURE — 83735 ASSAY OF MAGNESIUM: CPT | Performed by: SURGERY

## 2025-07-23 PROCEDURE — 97110 THERAPEUTIC EXERCISES: CPT | Mod: GO

## 2025-07-23 PROCEDURE — 250N000011 HC RX IP 250 OP 636: Performed by: PHYSICIAN ASSISTANT

## 2025-07-23 PROCEDURE — 80048 BASIC METABOLIC PNL TOTAL CA: CPT

## 2025-07-23 PROCEDURE — 84100 ASSAY OF PHOSPHORUS: CPT | Performed by: SURGERY

## 2025-07-23 PROCEDURE — 85027 COMPLETE CBC AUTOMATED: CPT

## 2025-07-23 PROCEDURE — 250N000013 HC RX MED GY IP 250 OP 250 PS 637

## 2025-07-23 PROCEDURE — 250N000013 HC RX MED GY IP 250 OP 250 PS 637: Performed by: SURGERY

## 2025-07-23 PROCEDURE — 97535 SELF CARE MNGMENT TRAINING: CPT | Mod: GO

## 2025-07-23 PROCEDURE — 93325 DOPPLER ECHO COLOR FLOW MAPG: CPT | Mod: 26 | Performed by: STUDENT IN AN ORGANIZED HEALTH CARE EDUCATION/TRAINING PROGRAM

## 2025-07-23 PROCEDURE — 82248 BILIRUBIN DIRECT: CPT | Performed by: PHYSICIAN ASSISTANT

## 2025-07-23 PROCEDURE — 93308 TTE F-UP OR LMTD: CPT | Mod: 26 | Performed by: STUDENT IN AN ORGANIZED HEALTH CARE EDUCATION/TRAINING PROGRAM

## 2025-07-23 PROCEDURE — 99418 PROLNG IP/OBS E/M EA 15 MIN: CPT | Mod: 24

## 2025-07-23 PROCEDURE — 71045 X-RAY EXAM CHEST 1 VIEW: CPT

## 2025-07-23 PROCEDURE — 93321 DOPPLER ECHO F-UP/LMTD STD: CPT | Mod: 26 | Performed by: STUDENT IN AN ORGANIZED HEALTH CARE EDUCATION/TRAINING PROGRAM

## 2025-07-23 PROCEDURE — 250N000013 HC RX MED GY IP 250 OP 250 PS 637: Performed by: PHYSICIAN ASSISTANT

## 2025-07-23 PROCEDURE — 99255 IP/OBS CONSLTJ NEW/EST HI 80: CPT | Mod: 24

## 2025-07-23 PROCEDURE — 87338 HPYLORI STOOL AG IA: CPT

## 2025-07-23 PROCEDURE — 36415 COLL VENOUS BLD VENIPUNCTURE: CPT

## 2025-07-23 PROCEDURE — 85610 PROTHROMBIN TIME: CPT

## 2025-07-23 RX ORDER — ACETAMINOPHEN 325 MG/1
975 TABLET ORAL EVERY 6 HOURS
Status: DISCONTINUED | OUTPATIENT
Start: 2025-07-23 | End: 2025-07-23 | Stop reason: HOSPADM

## 2025-07-23 RX ORDER — METOPROLOL TARTRATE 25 MG/1
12.5 TABLET, FILM COATED ORAL 2 TIMES DAILY
Qty: 60 TABLET | Refills: 1 | Status: SHIPPED | OUTPATIENT
Start: 2025-07-23

## 2025-07-23 RX ORDER — ACETAMINOPHEN 500 MG
1000 TABLET ORAL EVERY 6 HOURS PRN
Qty: 200 TABLET | Refills: 0 | Status: CANCELLED | OUTPATIENT
Start: 2025-07-23

## 2025-07-23 RX ORDER — OXYCODONE HYDROCHLORIDE 5 MG/1
5 TABLET ORAL EVERY 6 HOURS PRN
Qty: 30 TABLET | Refills: 0 | Status: SHIPPED | OUTPATIENT
Start: 2025-07-23

## 2025-07-23 RX ORDER — WARFARIN SODIUM 1 MG/1
2 TABLET ORAL
Status: DISCONTINUED | OUTPATIENT
Start: 2025-07-23 | End: 2025-07-23

## 2025-07-23 RX ORDER — METHOCARBAMOL 750 MG/1
750 TABLET, FILM COATED ORAL 2 TIMES DAILY PRN
Qty: 30 TABLET | Refills: 0 | Status: CANCELLED | OUTPATIENT
Start: 2025-07-23

## 2025-07-23 RX ORDER — METHOCARBAMOL 750 MG/1
750 TABLET, FILM COATED ORAL 4 TIMES DAILY PRN
Qty: 30 TABLET | Refills: 0 | Status: SHIPPED | OUTPATIENT
Start: 2025-07-23

## 2025-07-23 RX ORDER — PANTOPRAZOLE SODIUM 40 MG/1
40 TABLET, DELAYED RELEASE ORAL 2 TIMES DAILY
Qty: 90 TABLET | Refills: 2 | Status: SHIPPED | OUTPATIENT
Start: 2025-07-23

## 2025-07-23 RX ORDER — ASPIRIN 81 MG/1
81 TABLET, CHEWABLE ORAL DAILY
Qty: 30 TABLET | Refills: 1 | Status: SHIPPED | OUTPATIENT
Start: 2025-07-23

## 2025-07-23 RX ORDER — WARFARIN SODIUM 1 MG/1
2 TABLET ORAL
Status: COMPLETED | OUTPATIENT
Start: 2025-07-23 | End: 2025-07-23

## 2025-07-23 RX ADMIN — PANTOPRAZOLE SODIUM 40 MG: 40 INJECTION, POWDER, FOR SOLUTION INTRAVENOUS at 08:34

## 2025-07-23 RX ADMIN — ACETAMINOPHEN 975 MG: 325 TABLET ORAL at 13:57

## 2025-07-23 RX ADMIN — ACETAMINOPHEN 975 MG: 325 TABLET ORAL at 08:35

## 2025-07-23 RX ADMIN — ASPIRIN 81 MG CHEWABLE TABLET 81 MG: 81 TABLET CHEWABLE at 08:35

## 2025-07-23 RX ADMIN — OXYCODONE HYDROCHLORIDE 5 MG: 5 TABLET ORAL at 08:45

## 2025-07-23 RX ADMIN — METHOCARBAMOL 1000 MG: 500 TABLET ORAL at 13:58

## 2025-07-23 RX ADMIN — Medication 5 MG: at 01:58

## 2025-07-23 RX ADMIN — GLYCERIN 1 SUPPOSITORY: 2 SUPPOSITORY RECTAL at 09:47

## 2025-07-23 RX ADMIN — Medication 12.5 MG: at 08:35

## 2025-07-23 RX ADMIN — WARFARIN SODIUM 2 MG: 1 TABLET ORAL at 16:49

## 2025-07-23 RX ADMIN — OXYCODONE HYDROCHLORIDE 5 MG: 5 TABLET ORAL at 14:02

## 2025-07-23 ASSESSMENT — ACTIVITIES OF DAILY LIVING (ADL)
ADLS_ACUITY_SCORE: 38
ADLS_ACUITY_SCORE: 40
ADLS_ACUITY_SCORE: 38
ADLS_ACUITY_SCORE: 40
ADLS_ACUITY_SCORE: 38
ADLS_ACUITY_SCORE: 40
ADLS_ACUITY_SCORE: 40

## 2025-07-23 NOTE — PROGRESS NOTES
"[id number]    10 year old Uzbek-speaking male with a history of multiple non-bleeding duodenal ulcers 3/25/2019 found to have H. Pylori infection treated with Bismuth+metronidazole+tetracycline+PPI for 14 days with NEGATIVE H. 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 Malave complicated by post-operative respiratory failure requiring mechanical ventilation and hypovolemia requiring fluid bolus and admission to cardiothoracic ICU for cardiogenic shock requiring inotropic and pressor support, extubated 7/17/2025 and off pressor support since 7/17.    Blood pressure 98/64, pulse 79, temperature 98.1  F (36.7  C), temperature source Oral, resp. rate 18, height 1.727 m (5' 8\"), weight 62.4 kg (137 lb 8 oz), SpO2 100%.       DISCHARGE                         No discharge date for patient encounter.  ----------------------------------------------------------------------------  Discharged to: Home  Via: Automobile  Accompanied by: Family  Discharge Instructions: diet, activity, medications, follow up appointments, when to call the MD, aftercare instructions, and what to watchout for (i.e. s/s of infection, increasing SOB, palpitations, chest pain,)  Prescriptions: To be filled by   [facility]    pharmacy per pt's request; medication list reviewed & sent with pt  Follow Up Appointments: arranged; information given  Belongings: All sent with pt  IV: out  Telemetry: off  Pt exhibits understanding of above discharge instructions; all questions answered.    Discharge Paperwork: Signed, copied, and sent home with patient.     "

## 2025-07-23 NOTE — CONSULTS
"    Gastroenterology Consultation and Sign-Off  Note  GI Luminal Service    Date of Admission:  7/16/2025  Reason for Admission: re-do aortic valve replacement with mechanical valve and aortic root enlargement  Date of Consult  7/23/2025   Requesting Physician:  Francisco Javier Malave MD           ASSESSMENT AND RECOMMENDATIONS:   Assessment:  Leonel Mack is a 30 year old Danish-speaking male with a history of multiple non-bleeding duodenal ulcers 3/25/2019 found to have H. Pylori infection treated with Bismuth+metronidazole+tetracycline+PPI for 14 days with NEGATIVE H. 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 Malave complicated by post-operative respiratory failure requiring mechanical ventilation and hypovolemia requiring fluid bolus and admission to cardiothoracic ICU for cardiogenic shock requiring inotropic and pressor support, extubated 7/17/2025 and off pressor support since 7/17.    The GI Luminal Service was consulted regarding: \"vomiting with any oral intake/unable to keep meds down, did have BM today.\"       # Acute Post-Operative Intermittent Nausea with Intermittent Bilious Emesis   # History of Duodenal Ulcers 2/2 H. Pylori Infection 3/25/2019  Patient with acute post-operative intermittent nausea with intermittent bilious emesis.     Patient with history of multiple non-bleeding duodenal ulcers 3/25/2019 found to have H. Pylori infection treated with Bismuth, metronidazole, tetracycline plus PPI for 14 days, with PPI reportedly continued BID for 12 weeks, with subsequent NEGATIVE H. Pylori breath test 3/31/2022.  - Anticoagulation/Antiplatelets: Warfarin and Aspirin 81 mg.   - Antiacids: Famotidine 20 mg 7/16. Pantoprazole 40 mg 7/17-7/18 then discontinued.  Tums 7/20 and 7/21. Pantoprazole " restarted 40 mg IV x1 7/22, scheduled BID 7/23.  - Antiemetics: Ondansetron 4 mg x1 on 7/17, x2 on 7/20, x1 on 7/21; 8 mg x1 on 7/22. Prochlorperazine 10 mg x1 7/20, x2 7/21, x1 7/22.    - Opioids: Propofol gtt + Fentanyl 7/16, Oxycodone 20mg 7/17-7/19, 5 mg 7/20, and again 10 mg 7/22, 5 mg thus far 7/23.   - Anticholinergics: Methocarbamol 750 mg x5 on 7/17, 750 mg x4 7/18-7/2, then 750 mg x2 plus 1000 mg x2 on 7/21, 1000 mg x3 on 7/22.    Nausea and vomiting are non-specific symptoms and likely multifactorial in this patient with potential contributors differential including post-operative, medication adverse effect, likely component of GI dysmotility due significant opioids and anticholinergic medications, constipation, potentially PUD in the setting of aspirin use with history of duodenal ulcers in the absence of a PPI, CNS etiologies, endocrinologic and metabolic abnormalities, cardiac etiology, starvation.     Recommending minimizing and discontinuing opioids and anticholinergic medications as able. Stimulant laxatives (dulcolax) may be contributing to nausea +/- emesis; recommend discontinuing stimulant laxatives with bowel regimen discussed below. History of H. Pylori, so will reassess by H. Pylori stool antigen. Agree with Pantoprazole 40 mg BID for 2-3 months then decrease to Pantoprazole 40 mg PO daily for the duration of aspirin use given history of duodenal ulcers. Consider scheduling antiemetics if ongoing nausea.       # Acute Post-Operative Constipation   Patient with acute post-operative constipation this admission in the setting of opioid and anticholinergic medications.  Prior to surgery, bowel movements daily to every 2-3 days, varied.   - Per I/O Flowsheet, no bowel movements 7/16-7/18, bowel movements 7/19, none 7/20-7/21, then one on 7/22.   - Bowel Regimen Per MAR: Polyethylene glycol 17 grams 7/17-7/18. Senna-Docusate 1 tablet x2 7/17-7/18, x1 7/19, x2 7/20, x1 7/21. Bisacodyl suppository  7/19 and 7/22. Glycerin suppository x1 7/22. Magnesium Oxide 800 mg 7/18, 400 mg 7/19, 800 mg 7/20 which is also an osmotic laxative. Simethicone 7/19-7/21.     Recommend discontinuing stimulant laxatives (dulcolax oral and suppositories) and instead scheduling Glycerin Suppositories BID until tolerating oral intake. Once tolerating oral intake, start oral bowel regimen with Polyethylene glycol 17-34 grams in the morning, titrated to promote 1-2 soft, continent, easy to evacuate bowel movements daily, [place] stool type 4-5.        Recommendations:  -- No indication for acute GI endoscopic intervention.   -- Minimize and avoid opioids and anticholinergics as able.   -- Continue Pantoprazole 40 mg BID for 2-3 months then daily for the duration of aspirin use.   -- COLLECT H. Pylori stool antigen.   -- Strict detailed documentation of any emesis in the I/O Flowsheet, including appearance: color, consistency, frequency and amount.   - Consider smearing emesis thinly on white paper towel to distinguish color.   - If abnormal appearing emesis, consider uploading a picture to the media tab in the patient's chart and notify the Primary team.   -- Strict detailed documentation of rectal output in the I/O Flowsheet, including stool appearance: color, consistency, frequency and amount.   - Consider smearing stool thinly on white paper towel to distinguish color.   - If abnormal appearing stool, consider uploading a picture to the media tab in the patient's chart and notify the Primary team.   -- Given nausea present (potentially related to constipation), continue scheduled glycerin suppositories every 12 hours until adequate rectal output has been achieved and patient is tolerating oral intake, then start oral maintenance bowel regimen.   -- Once tolerating oral intake, start scheduled daily Maintenance Bowel Regimen:   - Miralax 17-34 grams daily, titrated to promote 1-2 soft, continent, easy to evacuate bowel movements  daily, [place] Stool Type 4-5.  - If no significant bowel movement after 3 days, recommend increasing Miralax 2 additional capfuls and add glycerin suppository BID (held in the rectum for at least 15 minutes).  - If no significant bowel movement after 5 days, continue above and add tap water enema or mineral oil enema BID (held in the rectum for at least 15 minutes).  - If no significant bowel movement after 7 days, proceed with Miralax-Gatorade Bowel Cleanse: 238 grams of Miralax with 64 ounces of Gatorade (no red, blue or purple), drink 12 ounces every 15 minutes until the solution is gone (finished in 2 hours).  - Caution with stimulant laxatives (bisacodyl, dulcolax) as they can lead to abdominal cramping, nausea +/- vomiting. These can be utilized on a PRN (as needed) basis.   - Avoid the use of lactulose for constipation as this leads to abdominal distension and bloating +/- nausea.   -- Continue Supportive Cares  - Adequate volume resuscitation with IVF, pRBCs.  - Monitor Hemoglobin closely. Transfuse to keep Hgb > 7 g/dL from GI standpoint.   - Monitor Platelets closely. Keep PLT > 50 10e3/uL from GI standpoint.  - Maintain hemodynamics: MAP >65 mmHg and HR <100.  - Monitor and optimize electrolytes.  - Monitor and optimize nutrition. --> Diet per primary team. Appreciate RD nutrition recs.   - Reposition every 30 minutes while awake.  - Encourage Ambulation as able: 4-6 walks per day.   -- Avoid NSAIDs.  -- Analgesics/Antiemetics per primary team.  - Consider scheduling antiemetics.   -- If the patient experiences overt GI bleeding with hemodynamic instability, please page the GI Luminal Service (listed on [employer]).       Outpatient:  -- No outpatient [facility] GI Follow-up indicated.   -- If ongoing GI symptoms, recommend patient follow-up with established local [facility] GI team.       COVID status: not tested this admission.     Discussed with Judy Tejeda PA-C - CVTS team.     The inpatient  "gastroenterology service will sign off at this time. Thank you for allowing us to participate in the care of this patient. Please do not hesitate to page the GI service with any questions or concerns.     The patient was discussed and plan agreed upon with Dr. Misha Parks, GI Luminal Service staff physician, who did not see or examine this patient on the date of this encounter.    Overall time spent on the date of this encounter preparing to see the patient (including chart review of available notes, clinical status events, imaging and labs); discussing, ordering, coordinating recommended medications, tests or procedures; communicating with other health care professionals; and documenting the above clinical information in the electronic medical record was 100 minutes.    Alka Leong PA-C  Inpatient Gastroenterology Service  [facility]  [place]          History of Present Illness:   Patient seen and examined at 0900. History is obtained from patient via phone [place]alander .      Leonel Mack is a 30 year old New Zealander-speaking male with a history of multiple non-bleeding duodenal ulcers 3/25/2019 found to have H. Pylori infection treated with Bismuth+metronidazole+tetracycline+PPI for 14 days with NEGATIVE H. 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 Malave complicated by post-operative respiratory failure requiring mechanical ventilation and hypovolemia requiring fluid bolus and admission to cardiothoracic ICU for cardiogenic shock requiring inotropic and pressor support, extubated 7/17/2025 and off pressor support since 7/17.    The GI Luminal Service was consulted regarding: \"vomiting with any oral intake/unable to keep meds down, did have BM today.\" "       Patient denies GI symptoms prior to surgery on 7/16/2025.  Reports baseline bowel movements are anywhere from 2-3 a day to 1 every 2 to 3 days.  Reports it really varies.    Not on any antiacid medications prior to admission.    Reports nausea has only been occurring since surgery and was significantly worse right after surgery and has been getting better day by day.  Patient denies nausea today.    Patient denies abdominal pain.    Endorses constipation since surgery with last bowel movement yesterday and then previous it had been 3 days and before that prior to admission.    Discussed assessment and plan as above.  Patient questions why recurrent neck pain is occurring.  Patient also notes having an outpatient office visit scheduled for tomorrow, questioning if there is somebody who can help get the appointment rescheduled.  Relayed these concerns to the primary CVTS team.    Patient had no additional questions or concerns the GI luminal service at this time.        Previous GI Endoscopic Procedures:    3/25/2019 - EGD - [facility]  Indications:        Acute post hemorrhagic anemia, Hematemesis, Melena,                       Nausea with vomiting   Findings:       The esophagus was normal.        The stomach was normal.        Two non-bleeding cratered duodenal ulcers with pigmented material were        found in the duodenal bulb. The largest lesion was 6 mm in largest        dimension. Area was successfully injected with 3 mL of a 1:10,000        solution of epinephrine for hemostasis. For hemostasis, two hemostatic        clips were successfully placed. There was no bleeding at the end of the        procedure.     Impression:         - Normal esophagus.                       - Normal stomach.                       - Multiple non-bleeding duodenal ulcers with pigmented                       material. Injected. Clips were placed.                       - No specimens collected.     Recommendation:     - Return  patient to ICU for ongoing care.                       - NPO x 4 hours and then could start clear liquids.                       - IV PPI and convert to daily PPI x 12 weeks.                       - Avoid aspirin and NSAIDs.   Fabrice Patel MD   3/25/2019 12:35:42 PM               Past Medical History:   Reviewed and edited as appropriate  Past Medical History:   Diagnosis Date    Aortic valve stenosis, congenital     s/p aortic valve replacement in 2013    Congenital subaortic stenosis of membranous type     History of GI bleed 2019    Prediabetes     Status post aortic valve replacement 2013            Past Surgical History:   Reviewed and edited as appropriate   Past Surgical History:   Procedure Laterality Date    REDO STERNOTOMY REPLACE VALVES AORTIC AND MITRAL N/A 7/16/2025    Procedure: Redo Median Sternotomy, Lysis of Adhesions, Cardiopulmonary Bypass, Aortic Root Enlargement with Hemashield size 5.1cm x 7.6cm, Redo Aortic Valve Replacement with [employer] Mechanical Heart Valve Size 19, Intraoperative Transesophageal Echocardiogram per Anesthesia;  Surgeon: Francisco Javier Malave MD;  Location: [facility] OR    REPLACE VALVE AORTIC N/A 12/17/2013    Procedure: Median Sternotomy on pump oxygenator, Aortic Valve Replacement, Transesophegeal Echo Cardiogram by Dr. Bojorquez.;  Surgeon: Jerome Patel MD;  Location:  OR              Social History:   The patient lives in [place], [place].     Alcohol: Denies.   Tobacco: Denies.   Illicit drugs: Denies.            Family History:   Patient's family history is reviewed today and is non-contributory    Family History   Problem Relation Age of Onset    Anesthesia Reaction No family hx of     Thrombosis No family hx of              Allergies:   Reviewed and edited as appropriate   No Known Allergies         Medications:     Current Facility-Administered Medications   Medication Dose Route Frequency Provider Last Rate Last Admin    acetaminophen (TYLENOL) tablet 975  mg  975 mg Oral Q6H Azeb Hess MD PhD   975 mg at 07/23/25 0835    aspirin (ASA) chewable tablet 81 mg  81 mg Oral or NG Tube Daily Chevy Hickman MD   81 mg at 07/23/25 0835    calcium carbonate (TUMS) chewable tablet 1,000 mg  1,000 mg Oral 4x Daily PRN Azeb Hess MD PhD   1,000 mg at 07/21/25 0740    glucose gel 15-30 g  15-30 g Oral Q15 Min PRN Heena Munguia MD        Or    dextrose 50 % injection 25-50 mL  25-50 mL Intravenous Q15 Min PRN Heena Munguia MD        Or    glucagon injection 1 mg  1 mg Subcutaneous Q15 Min PRN Heena Munguia MD        glycerin (ADULT) Suppository 1 suppository  1 suppository Rectal BID Elaina Tejeda PA-C   1 suppository at 07/22/25 2032    Lidocaine (LIDOCARE) 4 % Patch 1 patch  1 patch Transdermal Q24H Elaina Tejeda PA-C   1 patch at 07/22/25 1535    lidocaine (LMX4) kit   Topical Q1H PRN Chevy Hickman MD        lidocaine 1 % 0.1-1 mL  0.1-1 mL Other Q1H PRN Chevy Hickman MD        magnesium hydroxide (MILK OF MAGNESIA) suspension 30 mL  30 mL Oral Daily PRN Chevy Hickman MD        melatonin tablet 5 mg  5 mg Oral At Bedtime PRN Azeb Hess MD PhD   5 mg at 07/23/25 0158    methocarbamol (ROBAXIN) tablet 1,000 mg  1,000 mg Oral 4x Daily PRN Veronika Martinez APRN CNP   1,000 mg at 07/22/25 2253    metoprolol tartrate (LOPRESSOR) half-tab 12.5 mg  12.5 mg Oral BID Smitha Johnson PA-C   12.5 mg at 07/23/25 0835    naloxone (NARCAN) injection 0.2 mg  0.2 mg Intravenous Q2 Min PRN Francisco Javier Malave MD        Or    naloxone (NARCAN) injection 0.4 mg  0.4 mg Intravenous Q2 Min PRN Francisco Javier Malave MD        Or    naloxone (NARCAN) injection 0.2 mg  0.2 mg Intramuscular Q2 Min PRN Francisco Javier Malave MD        Or    naloxone (NARCAN) injection 0.4 mg  0.4 mg Intramuscular Q2 Min PRN Francisco Javier Malave MD        ondansetron (ZOFRAN ODT) ODT tab 8 mg  8 mg Oral Q6H PRN Azeb Hess MD PhD   8 mg at 07/22/25 0853    Or    ondansetron (ZOFRAN) injection 8 mg  8 mg Intravenous Q6H PRN  Azeb Hess MD PhD        oxyCODONE (ROXICODONE) tablet 5 mg  5 mg Oral or Feeding Tube Q4H PRN Chevy Hickman MD   5 mg at 07/23/25 0845    Or    oxyCODONE IR (ROXICODONE) tablet 10 mg  10 mg Oral or Feeding Tube Q4H PRN Chevy Hickman MD   10 mg at 07/22/25 2031    pantoprazole (PROTONIX) injection 40 mg  40 mg Intravenous BID Elaina Tejeda PA-C   40 mg at 07/23/25 0834    polyethylene glycol (MIRALAX) Packet 17 g  17 g Oral or Feeding Tube Daily PRN Smitha Johnson PA-C        prochlorperazine (COMPAZINE) injection 10 mg  10 mg Intravenous Q6H PRN Chevy Hickman MD   10 mg at 07/22/25 1024    Or    prochlorperazine (COMPAZINE) tablet 10 mg  10 mg Oral Q6H PRN Chevy Hickman MD        [Held by provider] senna-docusate (SENOKOT-S/PERICOLACE) 8.6-50 MG per tablet 1 tablet  1 tablet Oral or Feeding Tube BID Chevy Hickman MD   1 tablet at 07/21/25 0740    simethicone (MYLICON) chewable tablet 80 mg  80 mg Oral Q6H PRN Azeb Hess MD PhD   80 mg at 07/21/25 1803    sodium chloride (PF) 0.9% PF flush 3 mL  3 mL Intracatheter Q8H Chevy Hickman MD   3 mL at 07/23/25 0845    sodium chloride (PF) 0.9% PF flush 3 mL  3 mL Intracatheter q1 min prn Chevy Hickman MD   3 mL at 07/21/25 1046    Warfarin Dose Required Daily - Pharmacist Managed  1 each Oral See Admin Instructions Jan Cantu APRN CNP                 Review of Systems:     A complete review of systems was performed and is negative except as noted in the HPI           Physical Exam:   Temp: 98.5  F (36.9  C) Temp src: Oral BP: (!) 87/54 Pulse: 93   Resp: 18 SpO2: 99 % O2 Device: None (Room air)    Wt:   Wt Readings from Last 2 Encounters:   07/23/25 62.4 kg (137 lb 8 oz)   07/03/25 65.9 kg (145 lb 3.2 oz)        General: 30 year old Haitian-speaking male lying in the hospital bed with the head of bed elevated in NAD. Appears comfortable.  Answers appropriately via Haitian phone .    HEENT: Head is AT/NC. Sclera anicteric.   Lungs: No increased work of  breathing, speaking in full sentences, equal chest rise. On Room Air.   Heart: Regular rate. Peripheral perfusion intact.  Abdomen: Soft, non-tender, non-distended. No peritoneal signs.  Extremities: WWP.  Musculoskeletal: No gross deformity.  Skin: No jaundice or rash on exposed skin.  Neurologic: Grossly non-focal.  CN 2-12 grossly intact.   Mental status/Psych: A&O. Asks/answers questions appropriately via [employer] phone . Pleasant, interactive via [employer] phone .             Data:   LAB WORK:    BMP  Recent Labs   Lab 07/23/25  0549 07/22/25  0540 07/21/25  1654 07/21/25  0643    138 141 138   POTASSIUM 3.9 3.8 3.7 3.8   CHLORIDE 103 106 106 106   NURIA 9.0 8.5* 9.0 8.6*   CO2 22 21* 21* 23   BUN 8.3 8.9 6.8 7.2   CR 0.73 0.69 0.73 0.76   * 127* 123* 116*     CBC  Recent Labs   Lab 07/23/25  0549 07/22/25  0540 07/21/25  1654 07/21/25  0643   WBC 6.4 5.6 6.0 5.1   RBC 3.25* 2.94* 3.18* 2.98*   HGB 9.6* 8.6* 9.4* 8.9*   HCT 29.1* 25.9* 28.5* 27.0*   MCV 90 88 90 91   MCH 29.5 29.3 29.6 29.9   MCHC 33.0 33.2 33.0 33.0   RDW 13.8 13.8 13.7 13.6    155 162 146*     INR  Recent Labs   Lab 07/23/25  0549 07/22/25  0540 07/21/25  0643 07/20/25  0435   INR 2.05* 2.26* 2.01* 2.18*     LFTs  Recent Labs   Lab 07/23/25  0549 07/21/25  1654 07/19/25  0545 07/18/25  0329   ALKPHOS 124 125 53 39*   * 93* 38 46*   * 66 12 11   BILITOTAL 0.5 0.7 0.5 0.6   PROTTOTAL 7.0 7.2 5.7* 5.1*   ALBUMIN 3.9 4.0 3.3* 3.1*      PANC  Recent Labs   Lab 07/21/25  0643   LIPASE 21   AMYLASE 26*       Most Recent Abdominal IMAGING:    XR ABDOMEN PORT 1 VIEW, 7/21/2025 3:30 PM  Indication: evaluating for ileus  Comparison: Abdominal radiographs 7/20/2025     Findings:   Portable AP supine radiograph of the abdomen. Stable position of  retained epicardial pacer wire along the left paracentral abdomen.  Intact median sternotomy wires. Mild colonic stool burden. No  abnormally dilated loops of  bowel. No pneumatosis or portal venous  gas. No acute soft tissue or osseous abnormality. Lung bases are  clear.                                                                   Impression: Nonobstructive bowel gas pattern    =======================================================================

## 2025-07-23 NOTE — PLAN OF CARE
"Goal Outcome Evaluation:           Overall Patient Progress: no change    HOURS OF CARE:3050-0252                           TEAM: CVTS     SHIFT EVENTS: No acute changes over night     VS: /68 (BP Location: Left arm, Cuff Size: Adult Regular)   Pulse 89   Temp 97.5  F (36.4  C) (Oral)   Resp 18   Ht 1.727 m (5' 8\")   Wt 64.9 kg (143 lb 1.6 oz)   SpO2 98%   BMI 21.76 kg/m        PAIN: pt complained of incisional pain 8/10. PRN oxy 10mg and Robaxin given x1   NEURO: A&O x4, calls appropriately.    RESP: RA, denies SOB, sats in the 90's  CARDIAC: SR/ST, HR 's   DIET: Reg, complains of Nausea   B.S. CHECKS:  LBM: 7/22 per pt report   : voids spontaneously   ACTIVITY: SBA, pt ambulated outside of room with mom   SKIN: no new deficits found   LDA'S:L. PIV-sl     DRIPS:    PLAN: Continue to follow plan of care notify team with any changes    ELECTROLYTE REPLACEMENTS:  K:  Phos:  Mg:                    "

## 2025-07-23 NOTE — PROGRESS NOTES
Care Management Follow Up    Length of Stay (days): 7    Expected Discharge Date: 07/23/2025     Concerns to be Addressed:       Patient plan of care discussed at interdisciplinary rounds: Yes    Anticipated Discharge Disposition: Home     Anticipated Discharge Services: Home Care, Outpatient Anticoagulation  Anticipated Discharge DME: None    Referrals Placed by CM/SW: Home Care    Additional Information:  SW met with pt, mother and sister at bedside to answer questions regarding discharge planning. Family inquiring if pt could have home care services for nursing and therapies prior to starting OP CR. ROLDAN updated this writer.    Placed referral to [facility] for home care RN/PT/OT and pt was accepted by [facility]. Will need to ensure home care orders are in place.     [facility]  Phone: [phone]  Fax: [phone]  Pt scheduled for post hospital follow up and INR lab draw on Thursday 7/24/2025 at 2pm with Soniya Keen.     [facility]  Phone: [phone]  Fax: [phone]    Next Steps:   Update home care regarding discharge timing and fax orders at discharge  Fax discharge summary and warfarin dosing summary to [facility] for PCP follow up on 7/24. May need to reschedule if pt unable to discharge tomorrow.     Radha Wu RN BSN  6C Unit Care Coordinator  Phone number: [phone]    SEARCHABLE in [employer]

## 2025-07-23 NOTE — PROGRESS NOTES
Care Management Discharge Note    Discharge Date: 07/23/2025  I am assisting 6C RN CC: Radha Wu today with this pt's discharge.       Discharge Disposition:  Home w wife    Discharge Services:  New Warfarin monitoring with PCP arranged for 7/24   [facility]  Phone: [phone]  Fax: [phone]  Pt scheduled for post hospital follow up and INR lab draw on Thursday 7/24/2025 at 2pm with Soniya Keen.   I have fax'd AVS  Need to fax Discharge summary when completed____       [facility]  Phone: [phone]  Fax: [phone]  Per Radha they called for an update today and are waiting for the fax or orders.  I have fax'd AVS  Need to fax Discharge summary when completed____    My co-worker Radha Wu met w pt's mom and wife in their room and explained the PCP appointment and the Home Care.    Discharge DME:  None    Discharge Transportation:  family    Private pay costs discussed: Home Care to discuss w pt/wife.    Does the patient's insurance plan have a 3 day qualifying hospital stay waiver?  No    PAS Confirmation Code:  n/a  Patient/family educated on Medicare website which has current facility and service quality ratings:      Education Provided on the Discharge Plan: Per Radha and TEVIN Tejeda   Persons Notified of Discharge Plans: Pt/wife/pt's mother  Patient/Family in Agreement with the Plan:  Yes    Handoff Referral Completed: Yes, non-[facility] PCP: External handoff communication completed    Additional Information:  Pt's jed pop up says it's his mom---no paperwork for this as Radha found out it was for a previous surgery and she has called [facility] to inform them.    Suad Camacho RN

## 2025-07-24 ENCOUNTER — APPOINTMENT (OUTPATIENT)
Dept: INTERPRETER SERVICES | Facility: CLINIC | Age: 30
End: 2025-07-24
Payer: COMMERCIAL

## 2025-07-24 LAB — H PYLORI AG STL QL IA: NEGATIVE

## 2025-07-24 NOTE — PLAN OF CARE
Occupational Therapy Discharge Summary    Reason for therapy discharge:    Discharged to home with outpatient therapy.    Progress towards therapy goal(s). See goals on Care Plan in [facility] electronic health record for goal details.  Goals partially met.  Barriers to achieving goals:   discharge from facility.    Therapy recommendation(s):    Continued therapy is recommended.  Rationale/Recommendations:  Recommend OP CR to progress cardiopulmonary health.

## 2025-07-24 NOTE — DISCHARGE SUMMARY
[facility], [place]   Cardiothoracic Surgery Hospital Discharge Summary     Leonel Mack MRN# [id number]   Age: 30 year old YOB: 1995     Admitting Physician:  Francisco Javier Malave MD  Discharge Physician:  Elaina Tejeda PA-C  Primary Care Physician:        Aravind Begum     DATE OF ADMISSION: 7/16/2025      DATE OF DISCHARGE: July 23, 2025     Admit Wt: 145 lbs  Discharge Wt: 137 lbs          Primary Diagnoses:   Aortic stenosis of bio AVR - s/p redo IRVING, aortic root enlargement repair and mechanical AVR on 7/16 w/ Dr. Malave  Hx Congenital aortic stenosis s/p bio AVR in 2013  Undifferentiated shock; likely vasoplegia, resolved  Orthostatic hypotension  Retained cut pacing wires          Secondary Diagnoses:   Acute postoperative pain, improving  Post operative nausea/vomiting   Stress induced hyperglycemia, resolved  Pre-diabetes  Stress induced leukocytosis, resolved  Acute blood loss anemia, stable  Acute blood loss thrombocytopenia, chronic thrombocytopenia  Neck muscle spasms     PROCEDURES PERFORMED:   Date: 7/16/25  Surgeon: Dr. Francisco Javier Malave      INTRAOPERATIVE FINDINGS:    The patient's sternum had good quality . There were severe adhesions in the pericardial space. Patient coronary were of low height and close to the annulus.     PATHOLOGY RESULTS:    Aortic valve explant:  - Valvular tissue with myxoid degeneration  - Negative for significant inflammation    CULTURE RESULTS:    none    CONSULTS:    PT/OT  Intensivist  GI    BRIEF HISTORY OF ILLNESS:  Leonel Mack is a 30 year old male with PMH congenital subaortic stenosis s/p bAVR (2013), GIB, and thrombocytopenia. Presents to [facility] for re-do sternotomy IRVING, aortic root enlargement repair and mechanical AVR with Dr. Malave on 7/16/25.     HOSPITAL COURSE: Leonel Mack is a 30 year old male who on 7/16/2025 underwent the above-named procedures and tolerated the operation well.      Postoperatively was admitted to the CVICU.  Patient was extubated within protocol on POD #1.  Blood pressure and cardiac index were managed with vasopressors and inotropic agents which were continuously weaned until no longer needed.  Patient was subsequently  transferred to the surgical telemetry floor.    While on the surgical unit, the patient continued to progress well. Chest tubes and temporary pacemaker wires were removed when deemed appropriate. Heart rhythm remained stable.     Patient was fluid overloaded and treated with diuretics. They discharged below their admit weight and will not discharge with further diuretics.      Patient was transiently hyperglycemic and treated with insulin infusion then transitioned to sliding scale insulin per protocol. Blood sugars remained stable. No further glycemic control agents needed at this time.    The patient did get a post operative echo on 7/19 which revealed LV EF 65-70%, mechanical AVR with MG 19 mmHg, no pericardial effusion.     On 7/21, patient developed nausea and had bouts of emesis which prompted further investigation with GI consult. His workup was largely unremarkable and deemed post operative nausea and constipation. Noted previous history of duodenal ulcer secondary to H Pylori infection in 2019, retested H Pylori stool antigen during this hospitalization and negative.     GI recommends that patient Continue Pantoprazole 40 mg BID for 2-3 months then daily for the duration of aspirin use. Additionally, continue scheduled glycerin suppositories every 12 hours until adequate rectal output has been achieved and patient is tolerating oral intake     Of note, patient does have chronic thrombocytopenia and will follow up with his PCP if further workup is needed.      Prior to discharge, his pain was controlled well. He was previously experiencing neck spasms following vomiting, which were improved at time of discharge. He was working well with therapies,  "able to perform most ADLs, ambulate without difficulty, and had full return of bowel and bladder function.  On July 23, 2025, he was discharged to home in stable condition. Follow up with cardiology and cardiac surgery have been arranged. Pt encouraged to follow up with PCP and cardiac rehab upon discharge.  He is following up with his PCP on 7/24 and will specifically follow up regarding coumadin and further INR monitoring.     Patient discharged on aspirin:  Yes 81 mg  Patient discharged on beta blocker: yes Metoprolol tartrate 12.5 mg BID    Patient discharged on ACE Inhibitor/ARB: no  LVEF wnl   Patient discharged on statin: no; no CAD     *Patient's cardiologist is Ricardo Moreira in [facility]       Discharge Disposition:     Discharged to home            Condition on Discharge:     Discharge condition: Stable   Discharge vitals: Blood pressure 98/64, pulse 79, temperature 98.1  F (36.7  C), temperature source Oral, resp. rate 18, height 1.727 m (5' 8\"), weight 62.4 kg (137 lb 8 oz), SpO2 100%.   Code status on discharge: Full Code     Vitals:    07/20/25 0451 07/21/25 0526 07/23/25 0600   Weight: 65.3 kg (143 lb 14.4 oz) 64.9 kg (143 lb 1.6 oz) 62.4 kg (137 lb 8 oz)       DAY OF DISCHARGE PHYSICAL EXAM:    Gen: A&Ox4, NAD  Neuro: no focal deficits   CV: RRR, normal S1 S2, no murmurs, rubs or gallops.  JVD  Pulm: CTA, no wheezing or rhonchi, normal breathing on RA   Abd: nondistended, normal BS, soft, nontender  Ext: no peripheral edema   Incision: clean, dry, intact, no erythema, sternum stable  Tubes/drain sites: dressing clean and dry    LABS  Most Recent 3 CBC's:  Recent Labs   Lab Test 07/23/25  0549 07/22/25  0540 07/21/25  1654   WBC 6.4 5.6 6.0   HGB 9.6* 8.6* 9.4*   MCV 90 88 90    155 162      Most Recent 3 BMP's:  Recent Labs   Lab Test 07/23/25  0549 07/22/25  0540 07/21/25  1654    138 141   POTASSIUM 3.9 3.8 3.7   CHLORIDE 103 106 106   CO2 22 21* 21*   BUN 8.3 8.9 6.8   CR 0.73 0.69 " 0.73   ANIONGAP 10 11 14   NURIA 9.0 8.5* 9.0   * 127* 123*     Most Recent 2 LFT's:  Recent Labs   Lab Test 07/23/25  0549 07/21/25  1654   * 93*   * 66   ALKPHOS 124 125   BILITOTAL 0.5 0.7     Most Recent INR's and Anticoagulation Dosing History:  Anticoagulation Dose History  More data exists         Latest Ref Rng & Units 7/17/2025 7/18/2025 7/19/2025 7/20/2025 7/21/2025 7/22/2025 7/23/2025   Recent Dosing and Labs   warfarin ANTICOAGULANT (COUMADIN/JANTOVEN) 0.5 mg TABS half-tab - - - 0.5 mg, $Given - - - -   warfarin ANTICOAGULANT (COUMADIN/JANTOVEN) 1 MG tablet - - - - 1 mg, $Given 2 mg, $Given - 2 mg, $Given   warfarin ANTICOAGULANT (COUMADIN/JANTOVEN) 1.5 mg TABS half-tab - - - - - - 1.5 mg, $Given -   warfarin ANTICOAGULANT (COUMADIN/JANTOVEN) 3 MG tablet - 3 mg, $Given 3 mg, $Given - - - - -   INR 0.85 - 1.15 1.56  1.75  2.42  2.18  2.01  2.26  2.05      Most Recent 3 Troponin's:No lab results found.  Most Recent Cholesterol Panel:  Recent Labs   Lab Test 07/19/25  0545   CHOL 61   LDL 16   HDL 31*   TRIG 70     Most Recent 6 Bacteria Isolates From Any Culture (See EPIC Reports for Culture Details):No lab results found.  Most Recent TSH, T4 and A1c Labs:  Recent Labs   Lab Test 07/03/25  1153   A1C 5.8*      Recent Labs   Lab 07/23/25  0549 07/22/25  0540 07/21/25  1654 07/21/25  0643 07/20/25  1147 07/20/25  0435   * 127* 123* 116* 110* 98       Imaging:  Results for orders placed or performed during the hospital encounter of 07/16/25   XR Chest Port 1 View    Narrative    EXAM: XR CHEST PORT 1 VIEW 7/16/2025 4:44 PM    INDICATION: s/p aortic root enlargement with redo AVR    COMPARISON: 7/3/2025    TECHNIQUE: Single AP view of the chest.    FINDINGS:   Endotracheal tube in the mid thoracic trachea. Right IJ central venous  catheter at the superior SVC. 2 mediastinal drains and left basilar  chest tube. Aortic valve replacement. Intact sternotomy wires.  Epicardial pacer wire  projects over the heart.    Mild streaky opacities in the right lung base. Trachea is midline.   Cardiac silhouette is normal in size.  No focal pulmonary  consolidation.  No pleural effusion or definite pneumothorax although  questionable deep sulcus sign on the right incompletely included.   Bones and soft tissues are unremarkable.      Impression    IMPRESSION:   1. Mild atelectasis in the right lung base. Questionable deep sulcus  sign on the right incompletely included. No apical pneumothorax  identified on the 60 degree upright radiograph. Attention on follow-up  chest radiograph recommended.  2. Support devices appear appropriately positioned.    I have personally reviewed the examination and initial interpretation  and I agree with the findings.    LOYDA PEREZ MD         SYSTEM ID:  [id number]   XR Chest Port 1 View    Narrative    Exam: XR CHEST PORT 1 VIEW, 7/17/2025 1:48 AM    Comparison: 7/16/2025    History: Post Op CVTS Surgery    Findings:  Portable AP view of the semiupright chest. Right IJ CVC with tip in  the low SVC. Interval extubation. Mediastinal drains. Aortic valve  prostheses. Left chest tube. Trachea is midline. Cardiomediastinal  silhouette is within normal limits. No focal airspace opacity. No  pneumothorax or pleural effusion. The visualized upper abdomen is  unremarkable. No acute osseous abnormalities.      Impression    Impression:  1. Interval extubation. Stable position of support devices.  2. No focal airspace opacity.  I have personally reviewed the examination and initial interpretation  and I agree with the findings.    WILSON LEE MD         SYSTEM ID:  [id number]   XR Chest Port 1 View    Narrative    Exam: XR CHEST PORT 1 VIEW, 7/18/2025 3:27 AM    Comparison: 7/17/2025    History: Post Op CVTS Surgery    Findings:  Portable AP view of the semiupright chest. Right IJ CVC with tip in  the low SVC. Mediastinal drains. Aortic and mitral valve prostheses.  Left chest  tube. Trachea is midline. Cardiomediastinal silhouette is  within normal limits. Trace left apical pneumothorax. Increased right  basilar streaky opacities.. The visualized upper abdomen is  unremarkable. No acute osseous abnormalities.      Impression    Impression:   1. Trace left apical pneumothorax with left chest tube in place.  2. Increased right basilar streaky opacities likely representing  atelectasis.    I have personally reviewed the examination and initial interpretation  and I agree with the findings.    CHENG MUNIZ, DO         SYSTEM ID:  [id number]   XR Chest Port 1 View    Narrative    Exam: XR CHEST PORT 1 VIEW, 7/19/2025 9:44 AM    Indication: Post Op CVTS Surgery    Comparison: 7/18/2025    Findings:   Stable mediastinal drains and left basilar chest tube. Intact median  sternotomy wires with aortic valve prosthesis. The cardiomediastinal  silhouette and pulmonary vasculature are within normal limits. No  appreciable pleural effusion. Trace left apical pneumothorax. Slightly  decreased streaky perihilar and bibasilar opacities.      Impression    Impression:   1. Stable trace left apical pneumothorax with chest tube in place.  2. Decreased streaky perihilar and bibasilar atelectasis.    KATIE REYES, DO         SYSTEM ID:  [id number]   XR Chest Port 1 View    Narrative    Exam: XR CHEST PORT 1 VIEW  7/19/2025 1:11 PM     History:  s/p chest tube pull       Comparison: Chest x-ray 7/19/2025.    Findings: Portable, semiupright, AP view of the chest. The sternotomy  wires appear intact. Aortic valve prosthesis. Chest tube/mediastinal  drains removed.    Trachea is midline. The cardiomediastinal silhouette is within normal  limits. No significant pleural effusion. Trace left apical  pneumothorax. No focal airspace opacity. The visualized upper abdomen  is unremarkable.      Impression    Impression:  1. Stable trace left apical pneumothorax. Interval removal of the  chest tubes.  2. Trace basilar  atelectasis.    I have personally reviewed the examination and initial interpretation  and I agree with the findings.    CHENG MUNIZ DO         SYSTEM ID:  [id number]   XR Chest Port 1 View    Narrative    Chest X-ray, portable AP    History: Postop CVTS surgery  Additional history on chart review: 30-year-old male with past medical  history of congenital subaortic stenosis status post bAVR (2013), GI  bleed, and thrombocytopenia. Underwent redo sternotomy, mediastinal  lysis of adhesions, and redo aortic valve replacement on July 16, 2025    Comparison: Chest x-ray dated July 20, 2025.    Findings: Intact midline sternotomy wires. Postsurgical changes of  aortic valve replacement. There are no visible soft tissue or osseous  abnormalities. Sharp bilateral costophrenic angles. The trachea is  midline. Cardiomediastinal silhouette is within normal limits. Small  left apical pneumothorax. Streaky bibasilar perihilar opacification  similar to prior with no evidence of focal airspace opacification.       Impression    Impression: Stable small left apical pneumothorax.    I have personally reviewed the examination and initial interpretation  and I agree with the findings.    WILSON LEE MD         SYSTEM ID:  [id number]   XR Chest 2 Views    Narrative    EXAM: XR CHEST 2 VIEWS  7/20/2025 7:56 AM      HISTORY: s/p chest tube pull    COMPARISON: 7/19/2025.    FINDINGS: Two views of the chest. Stable postsurgical changes of the  chest with aortic valve prosthesis. Linear density projecting over the  lower chest/upper abdomen probably represents a retained pacer wire.  Interval decreased conspicuity of left apical pneumothorax. No right  pneumothorax. Small bilateral pleural effusions. Unchanged streaky  bilateral perihilar and basilar opacities likely represent  atelectasis. No focal consolidations. Stable osseous and soft tissue  structures.      Impression    IMPRESSION:   Interval decreased conspicuity of trace left  apical pneumothorax.  Small bilateral pleural effusions.    GERARDO SIMS MD         SYSTEM ID:  [id number]   XR Abdomen Port 1 View    Narrative    EXAMINATION:  XR ABDOMEN PORT 1 VIEW 7/20/2025     COMPARISON: CT dated 5/22/2025.    HISTORY: nausea, abdominal pain.    TECHNIQUE: Frontal supine view of the abdomen.    FINDINGS: Likely an epicardial pacer wire projecting over the upper  central abdomen. Mild colonic stool burden. No abnormally dilated  loops of bowel or pneumatosis in the visualized abdomen. No portal  venous gas.      Impression    IMPRESSION: Non-obstructed bowel gas pattern. Mild colonic stool  burden.    GERARDO SIMS MD         SYSTEM ID:  [id number]   XR Chest Port 1 View    Narrative    Exam: XR CHEST PORT 1 VIEW, 7/22/2025 8:56 AM    Indication: Post Op CVTS Surgery    Comparison: Chest x-ray dated July 21, 2025 and CT chest dated May 22,  2025.    Findings:   Intact midline sternotomy wires. Aortic valve prosthesis.  Nonobstructive bowel gas pattern in the visualized upper abdomen.  Sharp bilateral costophrenic angles. Mild, streaky right-sided basilar  opacification, the lungs are otherwise normal. No visualized  pneumothorax. The trachea is midline. Cardiomediastinal silhouette is  within normal limits. No visualized acute osseous or soft tissue  abnormalities.       Impression    Impression: Stable postsurgical changes of aortic valve replacement  with no visualized residual pneumothoraces.    I have personally reviewed the examination and initial interpretation  and I agree with the findings.    WILSON LEE MD         SYSTEM ID:  [id number]   US Abdomen Limited    Narrative    EXAMINATION: Limited Abdominal Ultrasound, 7/21/2025 2:07 PM     COMPARISON: CTA chest, abdomen and pelvis    HISTORY: Nondescript nausea, vomiting, abdominal pain - has antegrade  bowel function, assessing for cholecystitis etc    FINDINGS:   Fluid: Right pleural effusion demonstrated. No ascites.    Liver:  The liver demonstrates normal echotexture, measuring 14.2 cm in  craniocaudal dimension. There is no focal mass. The main portal vein  is patent with antegrade flow.    Gallbladder: There is no wall thickening, pericholecystic fluid,  positive sonographic Lyle's sign or evidence for cholelithiasis.    Bile Ducts: Both the intra- and extrahepatic biliary system are of  normal caliber.  The common bile duct is not visualized.    Pancreas: Visualized portions of the head and body of the pancreas are  unremarkable.     Kidney: The right kidney measures 8.9 cm long, borderline atrophic for  patient's age. There is no hydronephrosis or hydroureter, no shadowing  renal calculi, cystic lesion or mass.       Impression    IMPRESSION:   1.  Gallbladder within normal limits. Common duct is not visualized.  2.  Right pleural effusion.  3.  Right kidney is borderline size for patient's age.    LOYDA PEREZ MD         SYSTEM ID:  [id number]   XR Abdomen Port 1 View    Narrative    Exam: XR ABDOMEN PORT 1 VIEW, 7/21/2025 3:30 PM    Indication: evaluating for ileus    Comparison: Abdominal radiographs 7/20/2025    Findings:   Portable AP supine radiograph of the abdomen. Stable position of  retained epicardial pacer wire along the left paracentral abdomen.  Intact median sternotomy wires. Mild colonic stool burden. No  abnormally dilated loops of bowel. No pneumatosis or portal venous  gas. No acute soft tissue or osseous abnormality. Lung bases are  clear.      Impression    Impression: Nonobstructive bowel gas pattern    I have personally reviewed the examination and initial interpretation  and I agree with the findings.    LOYDA PEREZ MD         SYSTEM ID:  [id number]   XR Chest Port 1 View    Narrative    Exam: XR CHEST PORT 1 VIEW, 7/23/2025 9:14 AM    Indication: Post Op CVTS Surgery    Comparison: Chest x-rays from 7/22/2025 and 7/21/2025    Findings:   Portable semiupright AP view of the chest. Aortic valve  prosthesis.  Stable post surgical changes, with intact sternotomy wires and  mediastinal clips. Trachea is midline and the cardiac silhouette and  pulmonary vasculature are within normal limits. There are similar mild  right basilar streaky opacities, no new focal consolidation. No acute  osseous abnormality.      Impression    Impression:   Stable postsurgical changes of aortic valve replacement. Unchanged  mild right basilar atelectasis.    I have personally reviewed the examination and initial interpretation  and I agree with the findings.    WILSON LEE MD         SYSTEM ID:  [id number]   Echo BYRON - OR *Canceled*    Narrative    The following orders were created for panel order Echo BYRON - OR.  Procedure                               Abnormality         Status                     ---------                               -----------         ------                       Please view results for these tests on the individual orders.   Echo BYRON - OR *Canceled*    Narrative    The following orders were created for panel order Echo BYRON - OR.  Procedure                               Abnormality         Status                     ---------                               -----------         ------                       Please view results for these tests on the individual orders.   Echocardiogram Complete *Canceled*    Narrative    The following orders were created for panel order Echocardiogram Complete.  Procedure                               Abnormality         Status                     ---------                               -----------         ------                       Please view results for these tests on the individual orders.   Echocardiogram Complete *Canceled*    Narrative    The following orders were created for panel order Echocardiogram Complete.  Procedure                               Abnormality         Status                     ---------                               -----------         ------                        Please view results for these tests on the individual orders.   Echo Limited    Narrative    [id number]  [id number]  [id number]  [id number]^HALI^GARTH^     [facility],[place]  Echocardiography Laboratory  [address] [place] [address]     Name: PAUL KERR  MRN: [id number]  : 1995  Study Date: 2025 08:48 AM  Age: 30 yrs  Gender: Male  [place] [facility] [place]  Reason For Study: Aortic Valve Replacement  Ordering Physician: GARTH LAL  Performed By: Elizabeth Chavira RDCS     BSA: 1.8 m2  Height: 68 in  Weight: 149 lb  HR: 84  BP: 100/66 mmHg  ______________________________________________________________________________  Procedure  Limited Echocardiogram with two-dimensional, color and spectral Doppler.  ______________________________________________________________________________  Interpretation Summary  S/P redo sternotomy, aortic root enlargement with Hemashield patch, and redo-  AVR with 19 mm [employer] [place] bileaflet mechanical prosthesis 25 for  stenosis of prior 19 mm Epic bioprosthetic aortic valve (placed 2013 for  severe aortic stenosis.)     Global and regional left ventricular function is hyperkinetic with an EF of  65-70%.  Global right ventricular function is normal.  S/P AVR with 19 mm [employer] [place] bileaflet mechanical prosthesis. The  prosthetic valve is well-seated. The prosthetic leaflets are not well-imaged.  There is trace valvular AI, consistent with the expected washing jets of a  bileaflet mechanical valve. Peak velocity is 2.9 m/s, mean gradient is 19  mmHg, DVI is 0.49, EOA is 1.7 cm2, AT is 80 msec, and SVI is 50 mL/m2.  Status post aortic root enlargement with Hemashield graft. There is thickening  of the aortic root, likely related ot surgical material.  No pericardial effusion is present.     This study was compared with the study from 2013: There has been  interval redo-AVR and aortic  root enlargement.  ______________________________________________________________________________  Left Ventricle  Global and regional left ventricular function is hyperkinetic with an EF of  65-70%. Left ventricular size is normal. Left ventricular wall thickness  cannot evaluate.     Right Ventricle  The right ventricle is normal size. Global right ventricular function is  normal.     Atria  The atria cannot be assessed.     Mitral Valve  The mitral valve is normal. Trace mitral insufficiency is present.     Aortic Valve  S/P AVR with 19 mm [employer] [place] bileaflet mechanical prosthesis. The  prosthetic valve is well-seated. The prosthetic leaflets are not well-imaged.  There is trace valvular AI, consistent with the expected washing jets of a  bileaflet mechanical valve. Peak velocity is 2.9 m/s, mean gradient is 19  mmHg, DVI is 0.49, EOA is 1.7 cm2, AT is 80 msec, and SVI is 50 mL/m2.     Tricuspid Valve  The tricuspid valve is normal. Trace tricuspid insufficiency is present. The  peak velocity of the tricuspid regurgitant jet is not obtainable. Pulmonary  artery systolic pressure cannot be assessed.     Pulmonic Valve  The pulmonic valve is normal.     Vessels  Status post aortic root enlargement with Hemashield graft. There is thickening  of the aortic root, likely related ot surgical material. The inferior vena  cava was normal in size with preserved respiratory variability. IVC diameter  <2.1 cm collapsing >50% with sniff suggests a normal RA pressure of 3 mmHg.     Pericardium  No pericardial effusion is present.     Compared to Previous Study  This study was compared with the study from 2013 . There has been  interval redo-AVR and aortic root enlargement.  ______________________________________________________________________________  MMode/2D Measurements & Calculations     LVOT diam: 2.1 cm  LVOT area: 3.3 cm2     Doppler Measurements & Calculations  Ao V2 max: 291.6 cm/sec  Ao max P.0  mmHg  Ao V2 mean: 207.9 cm/sec  Ao mean P.1 mmHg  Ao V2 VTI: 51.5 cm  FIGUEROA(I,D): 1.7 cm2  FIGUEROA(V,D): 1.6 cm2  Ao acc time: 0.08 sec  LV V1 max P.7 mmHg  LV V1 max: 143.5 cm/sec  LV V1 VTI: 27.0 cm  SV(LVOT): 90.0 ml  SI(LVOT): 49.9 ml/m2  AV Yamil Ratio (DI): 0.49  FIGUEROA Index (cm2/m2): 0.97     ______________________________________________________________________________  Report approved by: Robert Macedo MD on 2025 04:13 PM         Echocardiogram Limited     Value    LVEF  60-65%    Narrative    [id number]  [id number]  [id number]  [id number]^ROSALIND^ANKUSH^BOLIVAR     [facility],[place]  Echocardiography Laboratory  [address] [place] [address]     Name: PAUL KERR  MRN: [id number]  : 1995  Study Date: 2025 09:00 AM  Age: 30 yrs  Gender: Male  [place] [facility] [place] For Study: Tachycardia, Aortic Valve Replacement  Ordering Physician: ANKUSH BOYER  Performed By: Veronika Leonard RDCS     BSA: 1.8 m2  Height: 68 in  Weight: 143 lb  HR: 84  BP: 93/68 mmHg  ______________________________________________________________________________  Procedure  Limited Echocardiogram with two-dimensional, color and spectral Doppler.  ______________________________________________________________________________  Interpretation Summary  S/P redo sternotomy, aortic root enlargement with Hemashield patch, and redo-  AVR with 19 mm [employer] [place] bileaflet mechanical prosthesis 25 for  stenosis of prior 19 mm Epic bioprosthetic aortic valve (placed 2013 for  severe aortic stenosis.)     Global and regional left ventricular function is normal with an EF of 60-65%.  Global right ventricular function is normal.  S/P AVR with 19 mm [employer] [place] bileaflet mechanical prosthesis. The  prosthetic valve is well-seated. Expected washing jets of a bileaflet  mechanical valve are observed. The valve is functioning normally (Peak  velocity is 2.2 m/s, mean  gradient is 11 mmHg, , AT is 85 msec.)  S/p aortic root replacement. The root remains thickened as previously shown on  25.     Compared to prior study 25 the gradients across the valve are slightly  lower.  ______________________________________________________________________________  Left Ventricle  Left ventricular wall thickness is normal. Left ventricular size is normal.  Global and regional left ventricular function is normal with an EF of 60-65%.  No regional wall motion abnormalities are seen.     Right Ventricle  The right ventricle is normal size. Global right ventricular function is  normal.     Mitral Valve  Trace mitral insufficiency is present.     Aortic Valve  S/P AVR with 19 mm [employer] [place] bileaflet mechanical prosthesis. The  prosthetic valve is well-seated. Expected washing jets of a bileaflet  mechanical valve are observed. The valve is functioning normally (Peak  velocity is 2.2 m/s, mean gradient is 11 mmHg, , AT is 85 msec.).     Tricuspid Valve  Trace tricuspid insufficiency is present. The peak velocity of the tricuspid  regurgitant jet is not obtainable. Pulmonary artery systolic pressure cannot  be assessed.     Pulmonic Valve  The valve leaflets are not well visualized.     Vessels  S/p aortic root replacement. The root remains thickened as previously shown on  25.     Pericardium  No pericardial effusion is present.     Compared to Previous Study  Compared to prior study 25 the gradients across the valve are slightly  lower.     ______________________________________________________________________________  MMode/2D Measurements & Calculations  IVSd: 0.88 cm  LVIDd: 4.5 cm  LVIDs: 3.1 cm  LVPWd: 0.97 cm  FS: 32.2 %     LV mass(C)d: 139.8 grams  LV mass(C)dI: 78.9 grams/m2  LVOT diam: 2.3 cm  LVOT area: 4.0 cm2  RWT: 0.43     Doppler Measurements & Calculations  Ao V2 max: 216.6 cm/sec  Ao max P.8 mmHg  Ao V2 mean: 157.5 cm/sec  Ao mean P.4 mmHg  Ao V2  VTI: 38.8 cm  FIGUEROA(I,D): 2.1 cm2  FIGUEROA(V,D): 2.3 cm2  Ao acc time: 0.08 sec  LV V1 max P.1 mmHg  LV V1 max: 123.2 cm/sec  LV V1 VTI: 20.8 cm  SV(LVOT): 83.4 ml  SI(LVOT): 47.1 ml/m2  AV Aymil Ratio (DI): 0.57  FIGUEROA Index (cm2/m2): 1.2     ______________________________________________________________________________  Report approved by: Kaitlynn Morales Dr on 2025 09:46 AM                PRE-ADMISSION MEDICATIONS:  No medications prior to admission.       DISCHARGE MEDICATIONS:   Discharge Medication List as of 2025  4:23 PM        START taking these medications    Details   acetaminophen (TYLENOL) 500 MG tablet Take 2 tablets (1,000 mg) by mouth every 6 hours as needed for mild pain., Disp-200 tablet, R-0, E-Prescribe      glycerin (ADULT) 2 g suppository Place 1 suppository rectally 2 times daily., Disp-30 suppository, R-0, E-PrescribeUse until stooling normally again      metoprolol tartrate (LOPRESSOR) 25 MG tablet Take 0.5 tablets (12.5 mg) by mouth 2 times daily., Disp-60 tablet, R-1, E-Prescribe      ondansetron (ZOFRAN ODT) 4 MG ODT tab Take 1 tablet (4 mg) by mouth every 6 hours as needed for nausea., Disp-21 tablet, R-0, E-Prescribe      oxyCODONE (ROXICODONE) 5 MG tablet Take 1 tablet (5 mg) by mouth or Feeding Tube every 6 hours as needed for moderate pain., Disp-30 tablet, R-0, E-Prescribe      pantoprazole (PROTONIX) 40 MG EC tablet Take 1 tablet (40 mg) by mouth 2 times daily., Disp-90 tablet, R-2, E-PrescribeTake twice daily x 3 months then may take once daily.      polyethylene glycol (MIRALAX) 17 GM/Dose powder Take 17 g by mouth 2 times daily as needed for constipation., Disp-510 g, R-0, E-Prescribe           CONTINUE these medications which have CHANGED    Details   aspirin (ASA) 81 MG chewable tablet Take 1 tablet (81 mg) by mouth daily., Disp-30 tablet, R-1, E-Prescribe      methocarbamol (ROBAXIN) 750 MG tablet Take 1 tablet (750 mg) by mouth 4 times daily as needed for  other (surgical pain, muscle aches)., Disp-30 tablet, R-0, E-Prescribe           STOP taking these medications       calcium carbonate (TUMS) 500 MG chewable tablet Comments:   Reason for Stopping:         senna-docusate (SENOKOT-S/PERICOLACE) 8.6-50 MG tablet Comments:   Reason for Stopping:         simethicone (MYLICON) 80 MG chewable tablet Comments:   Reason for Stopping:         warfarin ANTICOAGULANT (COUMADIN/JANTOVEN) 1 MG tablet Comments:   Reason for Stopping:                CC:Aravind Wallace PA-C  Cardiothoracic Surgery  Pager: [phone]    Time spent in total on discharge including coordination of care: 50 minutes    [facility]   Cardiothoracic Surgery  Office phone: [phone]  Office fax: [phone]

## 2025-07-25 ENCOUNTER — APPOINTMENT (OUTPATIENT)
Dept: INTERPRETER SERVICES | Facility: CLINIC | Age: 30
End: 2025-07-25
Payer: COMMERCIAL

## 2025-08-03 ENCOUNTER — HEALTH MAINTENANCE LETTER (OUTPATIENT)
Age: 30
End: 2025-08-03

## 2025-08-05 ENCOUNTER — OFFICE VISIT (OUTPATIENT)
Dept: CARDIOLOGY | Facility: CLINIC | Age: 30
End: 2025-08-05
Attending: SURGERY
Payer: COMMERCIAL

## 2025-08-05 VITALS
DIASTOLIC BLOOD PRESSURE: 60 MMHG | HEART RATE: 65 BPM | WEIGHT: 143.8 LBS | SYSTOLIC BLOOD PRESSURE: 91 MMHG | OXYGEN SATURATION: 100 % | BODY MASS INDEX: 21.86 KG/M2

## 2025-08-05 DIAGNOSIS — Z95.2 S/P AVR (AORTIC VALVE REPLACEMENT) AND AORTOPLASTY: Primary | ICD-10-CM

## 2025-08-05 PROCEDURE — G0463 HOSPITAL OUTPT CLINIC VISIT: HCPCS

## 2025-08-05 PROCEDURE — 99024 POSTOP FOLLOW-UP VISIT: CPT | Performed by: SURGERY

## 2025-08-05 RX ORDER — WARFARIN SODIUM 2 MG/1
TABLET ORAL
COMMUNITY
Start: 2025-07-31

## 2025-08-05 ASSESSMENT — PAIN SCALES - GENERAL: PAINLEVEL_OUTOF10: NO PAIN (0)

## (undated) DEVICE — ESU ELEC BLADE E-SEP INSULATED NEPTUNE 70MM 0703-070-002

## (undated) DEVICE — SPONGE RAY-TEC 4X8" 7318

## (undated) DEVICE — RX SURGIFLO HEMOSTATIC MATRIX W/THROMBIN 8ML 2994

## (undated) DEVICE — TUBING INSUFFLATION PNEUMOCLEAR 0620050100

## (undated) DEVICE — DRAPE NEW SLUSH/WARMER HUSHSLUSH 2.0 ESD340 ESD340

## (undated) DEVICE — WIPES FOLEY CARE SURESTEP PROVON DFC100

## (undated) DEVICE — SU ETHIBOND 2-0 V-5 DA 10X30" 10X52

## (undated) DEVICE — DRSG DRAIN 4X4" 7086

## (undated) DEVICE — LINEN TOWEL PACK X6 WHITE 5487

## (undated) DEVICE — TOURNIQUET VASCULAR KIT 7 1/2" 79012

## (undated) DEVICE — SU ETHIBOND 2-0 SHDA 30" X563H

## (undated) DEVICE — DRAPE IOBAN INCISE 23X17" 6650EZ

## (undated) DEVICE — SUCTION DRY CHEST DRAIN OASIS 3600-100

## (undated) DEVICE — SU STEEL 6 CCS 4X18" M654G

## (undated) DEVICE — PROTECTOR ARM LG FOAM TRENDELENBURG TAP200

## (undated) DEVICE — SU PROLENE 5-0 RB-2DA 30" 8710H

## (undated) DEVICE — DEVICE TISSUE STABILIZATION OCTOBASE 28707

## (undated) DEVICE — ESU ELEC BLADE E-SEP INSULATED NEPTUNE 165MM 0703-165-002

## (undated) DEVICE — DRAPE ISOLATION BAG 1003

## (undated) DEVICE — SU ETHIBOND 3-0 BBDA 36" X588H

## (undated) DEVICE — SU PROLENE 6-0 C-1DA 30" 8706H

## (undated) DEVICE — DRAIN CHEST TUBE RIGHT ANGLED 24FR 8124

## (undated) DEVICE — GLOVE PROTEXIS POWDER FREE 7.0 ORTHO LIME 2D73ET70

## (undated) DEVICE — SU VICRYL 0 CTX 36" J370H

## (undated) DEVICE — BLADE SAW OFFSET FAN STRK 30X30X0.38MM 5400-134-279

## (undated) DEVICE — PACK ADULT HEART UMMC PV15CG92D

## (undated) DEVICE — SU ETHIBOND 0 CT-1 CR 8X18" CX21D

## (undated) DEVICE — SU STEEL MYO/WIRE II STERNOTOMY 8 BE-1 3X14" 048-217

## (undated) DEVICE — PACK GOWN 3/PK DISP XL SBA32GPFCB

## (undated) DEVICE — TAPE MEDIPORE 4"X2YD 2864

## (undated) DEVICE — TUBING SUCTION DRAINAGE PLEURAL DUAL 8884714200

## (undated) DEVICE — SU PROLENE 4-0 RB-1DA 36" 8557H

## (undated) DEVICE — SOL WATER IRRIG 1000ML BOTTLE 2F7114

## (undated) DEVICE — SU PROLENE 3-0 SHDA 36" 8522H

## (undated) DEVICE — SU VICRYL+ 3-0 FS1 27IN UND VCP442H

## (undated) DEVICE — LINEN TOWEL PACK X30 5481

## (undated) DEVICE — SU DEVICE ENDO COR KNOT QUICK LOAD 030850

## (undated) DEVICE — SU SILK 0 TIE 6X30" A306H

## (undated) DEVICE — SU PROLENE 4-0 SHDA 36" 8521H

## (undated) DEVICE — TIES BANDING T50R

## (undated) DEVICE — SUCTION CATH AIRLIFE TRI-FLO W/CONTROL PORT 14FR  T60C

## (undated) DEVICE — DRSG ABDOMINAL 07 1/2X8" 7197D

## (undated) DEVICE — LEAD PACER MYOCARDIAL BIPOLAR TEMPORARY 53CM 6495F

## (undated) DEVICE — SU PLEDGET SOFT TFE 3/8"X3/26"X1/16" PCP40

## (undated) DEVICE — DEFIB PRO-PADZ LVP LQD GEL ADULT 8900-2105-01

## (undated) DEVICE — SURGICEL HEMOSTAT 4X8" 1952

## (undated) DEVICE — CABLE MYO/LEAD PACING WHITE DISP 019-530

## (undated) DEVICE — SU ETHIBOND 2-0 V-5 DA 30" 10X52N

## (undated) DEVICE — Device

## (undated) DEVICE — SUCTION MANIFOLD NEPTUNE 2 SYS 4 PORT 0702-020-000

## (undated) DEVICE — BLADE KNIFE SURG 15 371115

## (undated) DEVICE — ESU EYE LOW TEMP AA17

## (undated) DEVICE — NDL COUNTER 20CT 31142493

## (undated) DEVICE — PREP CHLORAPREP 26ML TINTED HI-LITE ORANGE 930815

## (undated) DEVICE — SU DEVICE COR-KNOT MINI 4X14MM 031350

## (undated) DEVICE — DRSG TELFA 3X8" 1238

## (undated) DEVICE — DRAIN CHEST TUBE 32FR STR 8032

## (undated) RX ORDER — CALCIUM CHLORIDE 100 MG/ML
INJECTION INTRAVENOUS; INTRAVENTRICULAR
Status: DISPENSED

## (undated) RX ORDER — CEFAZOLIN SODIUM 1 G/3ML
INJECTION, POWDER, FOR SOLUTION INTRAMUSCULAR; INTRAVENOUS
Status: DISPENSED
Start: 2025-07-16

## (undated) RX ORDER — HYDROMORPHONE HYDROCHLORIDE 1 MG/ML
INJECTION, SOLUTION INTRAMUSCULAR; INTRAVENOUS; SUBCUTANEOUS
Status: DISPENSED
Start: 2025-07-16

## (undated) RX ORDER — HEPARIN SODIUM 1000 [USP'U]/ML
INJECTION, SOLUTION INTRAVENOUS; SUBCUTANEOUS
Status: DISPENSED
Start: 2025-07-16

## (undated) RX ORDER — CALCIUM CHLORIDE 100 MG/ML
INJECTION INTRAVENOUS; INTRAVENTRICULAR
Status: DISPENSED
Start: 2025-07-16

## (undated) RX ORDER — ACETAMINOPHEN 325 MG/1
TABLET ORAL
Status: DISPENSED
Start: 2025-07-16

## (undated) RX ORDER — PROTAMINE SULFATE 10 MG/ML
INJECTION, SOLUTION INTRAVENOUS
Status: DISPENSED

## (undated) RX ORDER — FENTANYL CITRATE 50 UG/ML
INJECTION, SOLUTION INTRAMUSCULAR; INTRAVENOUS
Status: DISPENSED
Start: 2025-07-16

## (undated) RX ORDER — HEPARIN SODIUM 1000 [USP'U]/ML
INJECTION, SOLUTION INTRAVENOUS; SUBCUTANEOUS
Status: DISPENSED

## (undated) RX ORDER — CHLORHEXIDINE GLUCONATE ORAL RINSE 1.2 MG/ML
SOLUTION DENTAL
Status: DISPENSED
Start: 2025-07-16

## (undated) RX ORDER — SODIUM CHLORIDE, SODIUM GLUCONATE, SODIUM ACETATE, POTASSIUM CHLORIDE AND MAGNESIUM CHLORIDE 526; 502; 368; 37; 30 MG/100ML; MG/100ML; MG/100ML; MG/100ML; MG/100ML
INJECTION, SOLUTION INTRAVENOUS
Status: DISPENSED

## (undated) RX ORDER — FENTANYL CITRATE-0.9 % NACL/PF 10 MCG/ML
PLASTIC BAG, INJECTION (ML) INTRAVENOUS
Status: DISPENSED

## (undated) RX ORDER — CEFAZOLIN SODIUM/WATER 2 G/20 ML
SYRINGE (ML) INTRAVENOUS
Status: DISPENSED
Start: 2025-07-16

## (undated) RX ORDER — LIDOCAINE HYDROCHLORIDE 10 MG/ML
INJECTION, SOLUTION EPIDURAL; INFILTRATION; INTRACAUDAL; PERINEURAL
Status: DISPENSED
Start: 2025-07-16

## (undated) RX ORDER — GABAPENTIN 300 MG/1
CAPSULE ORAL
Status: DISPENSED
Start: 2025-07-16

## (undated) RX ORDER — ALBUMIN HUMAN 5 %
INTRAVENOUS SOLUTION INTRAVENOUS
Status: DISPENSED

## (undated) RX ORDER — FAMOTIDINE 20 MG/1
TABLET, FILM COATED ORAL
Status: DISPENSED
Start: 2025-07-16